# Patient Record
Sex: MALE | Race: BLACK OR AFRICAN AMERICAN | NOT HISPANIC OR LATINO
[De-identification: names, ages, dates, MRNs, and addresses within clinical notes are randomized per-mention and may not be internally consistent; named-entity substitution may affect disease eponyms.]

---

## 2017-10-18 ENCOUNTER — APPOINTMENT (OUTPATIENT)
Dept: OPHTHALMOLOGY | Facility: CLINIC | Age: 62
End: 2017-10-18
Payer: COMMERCIAL

## 2017-10-18 DIAGNOSIS — Z87.891 PERSONAL HISTORY OF NICOTINE DEPENDENCE: ICD-10-CM

## 2017-10-18 PROCEDURE — 92225: CPT | Mod: RT

## 2017-10-18 PROCEDURE — 92014 COMPRE OPH EXAM EST PT 1/>: CPT

## 2017-10-25 ENCOUNTER — APPOINTMENT (OUTPATIENT)
Dept: OPHTHALMOLOGY | Facility: CLINIC | Age: 62
End: 2017-10-25

## 2017-11-01 ENCOUNTER — APPOINTMENT (OUTPATIENT)
Dept: PULMONOLOGY | Facility: CLINIC | Age: 62
End: 2017-11-01

## 2017-11-09 ENCOUNTER — APPOINTMENT (OUTPATIENT)
Dept: PULMONOLOGY | Facility: CLINIC | Age: 62
End: 2017-11-09
Payer: COMMERCIAL

## 2017-11-09 VITALS
HEIGHT: 73 IN | SYSTOLIC BLOOD PRESSURE: 158 MMHG | OXYGEN SATURATION: 98 % | WEIGHT: 252 LBS | DIASTOLIC BLOOD PRESSURE: 100 MMHG | HEART RATE: 85 BPM | BODY MASS INDEX: 33.4 KG/M2

## 2017-11-09 DIAGNOSIS — Z86.69 PERSONAL HISTORY OF OTHER DISEASES OF THE NERVOUS SYSTEM AND SENSE ORGANS: ICD-10-CM

## 2017-11-09 DIAGNOSIS — I10 ESSENTIAL (PRIMARY) HYPERTENSION: ICD-10-CM

## 2017-11-09 DIAGNOSIS — Z82.49 FAMILY HISTORY OF ISCHEMIC HEART DISEASE AND OTHER DISEASES OF THE CIRCULATORY SYSTEM: ICD-10-CM

## 2017-11-09 DIAGNOSIS — Z86.39 PERSONAL HISTORY OF OTHER ENDOCRINE, NUTRITIONAL AND METABOLIC DISEASE: ICD-10-CM

## 2017-11-09 DIAGNOSIS — Z86.79 PERSONAL HISTORY OF OTHER DISEASES OF THE CIRCULATORY SYSTEM: ICD-10-CM

## 2017-11-09 PROCEDURE — 99214 OFFICE O/P EST MOD 30 MIN: CPT

## 2017-11-09 RX ORDER — PANTOPRAZOLE 40 MG/1
40 TABLET, DELAYED RELEASE ORAL
Qty: 90 | Refills: 0 | Status: DISCONTINUED | COMMUNITY
Start: 2016-10-16 | End: 2017-11-09

## 2017-11-09 RX ORDER — PEN NEEDLE, DIABETIC 29 G X1/2"
29G X 12.7MM NEEDLE, DISPOSABLE MISCELLANEOUS
Qty: 180 | Refills: 0 | Status: ACTIVE | COMMUNITY
Start: 2016-10-16

## 2017-11-09 RX ORDER — ASPIRIN ENTERIC COATED TABLETS 81 MG 81 MG/1
81 TABLET, DELAYED RELEASE ORAL
Qty: 90 | Refills: 0 | Status: ACTIVE | COMMUNITY
Start: 2017-09-26

## 2017-11-09 RX ORDER — INSULIN GLARGINE 300 U/ML
300 INJECTION, SOLUTION SUBCUTANEOUS
Qty: 18 | Refills: 0 | Status: DISCONTINUED | COMMUNITY
Start: 2017-04-13 | End: 2017-11-09

## 2017-11-09 RX ORDER — TIOTROPIUM BROMIDE 18 UG/1
18 CAPSULE ORAL; RESPIRATORY (INHALATION)
Qty: 90 | Refills: 0 | Status: ACTIVE | COMMUNITY
Start: 2017-09-26

## 2017-11-09 RX ORDER — INSULIN LISPRO 100 [IU]/ML
100 INJECTION, SOLUTION INTRAVENOUS; SUBCUTANEOUS
Qty: 15 | Refills: 0 | Status: ACTIVE | COMMUNITY
Start: 2017-10-05

## 2017-11-09 RX ORDER — METFORMIN HYDROCHLORIDE 1000 MG/1
1000 TABLET, COATED ORAL
Qty: 180 | Refills: 0 | Status: ACTIVE | COMMUNITY
Start: 2016-10-16

## 2017-11-09 RX ORDER — BUDESONIDE AND FORMOTEROL FUMARATE DIHYDRATE 160; 4.5 UG/1; UG/1
160-4.5 AEROSOL RESPIRATORY (INHALATION)
Qty: 30 | Refills: 0 | Status: ACTIVE | COMMUNITY
Start: 2017-09-26

## 2017-11-09 RX ORDER — ERGOCALCIFEROL 1.25 MG/1
1.25 MG CAPSULE, LIQUID FILLED ORAL
Qty: 13 | Refills: 0 | Status: ACTIVE | COMMUNITY
Start: 2016-10-16

## 2017-11-09 RX ORDER — FUROSEMIDE 20 MG/1
20 TABLET ORAL
Qty: 90 | Refills: 0 | Status: ACTIVE | COMMUNITY
Start: 2016-10-16

## 2017-11-09 RX ORDER — AMLODIPINE BESYLATE 10 MG/1
10 TABLET ORAL
Qty: 90 | Refills: 0 | Status: ACTIVE | COMMUNITY
Start: 2017-05-15

## 2017-11-09 RX ORDER — LABETALOL HYDROCHLORIDE 200 MG/1
200 TABLET, FILM COATED ORAL
Qty: 450 | Refills: 0 | Status: ACTIVE | COMMUNITY
Start: 2016-10-16

## 2017-11-09 RX ORDER — CLONIDINE HYDROCHLORIDE 0.2 MG/1
0.2 TABLET ORAL
Qty: 180 | Refills: 0 | Status: ACTIVE | COMMUNITY
Start: 2016-10-16

## 2017-11-09 RX ORDER — CHLORHEXIDINE GLUCONATE 4 %
1000 LIQUID (ML) TOPICAL
Qty: 90 | Refills: 0 | Status: ACTIVE | COMMUNITY
Start: 2016-10-16

## 2017-11-09 RX ORDER — ATORVASTATIN CALCIUM 20 MG/1
20 TABLET, FILM COATED ORAL
Qty: 90 | Refills: 0 | Status: ACTIVE | COMMUNITY
Start: 2017-05-15

## 2017-11-09 RX ORDER — ENALAPRIL MALEATE 20 MG/1
20 TABLET ORAL
Qty: 180 | Refills: 0 | Status: DISCONTINUED | COMMUNITY
Start: 2016-10-16 | End: 2017-11-09

## 2017-11-27 ENCOUNTER — APPOINTMENT (OUTPATIENT)
Dept: PULMONOLOGY | Facility: CLINIC | Age: 62
End: 2017-11-27
Payer: COMMERCIAL

## 2017-11-27 VITALS
DIASTOLIC BLOOD PRESSURE: 101 MMHG | SYSTOLIC BLOOD PRESSURE: 155 MMHG | HEART RATE: 82 BPM | OXYGEN SATURATION: 97 % | HEIGHT: 73 IN | WEIGHT: 250 LBS | BODY MASS INDEX: 33.13 KG/M2

## 2017-11-27 PROCEDURE — 99212 OFFICE O/P EST SF 10 MIN: CPT

## 2017-12-28 ENCOUNTER — APPOINTMENT (OUTPATIENT)
Dept: PULMONOLOGY | Facility: CLINIC | Age: 62
End: 2017-12-28
Payer: COMMERCIAL

## 2017-12-28 VITALS
SYSTOLIC BLOOD PRESSURE: 127 MMHG | HEIGHT: 73 IN | BODY MASS INDEX: 33.14 KG/M2 | DIASTOLIC BLOOD PRESSURE: 68 MMHG | HEART RATE: 86 BPM | WEIGHT: 250.06 LBS | OXYGEN SATURATION: 94 %

## 2017-12-28 DIAGNOSIS — R05 COUGH: ICD-10-CM

## 2017-12-28 DIAGNOSIS — R06.00 DYSPNEA, UNSPECIFIED: ICD-10-CM

## 2017-12-28 PROCEDURE — 99213 OFFICE O/P EST LOW 20 MIN: CPT | Mod: 25

## 2017-12-28 PROCEDURE — 94727 GAS DIL/WSHOT DETER LNG VOL: CPT

## 2017-12-28 PROCEDURE — 94010 BREATHING CAPACITY TEST: CPT

## 2017-12-28 PROCEDURE — 94729 DIFFUSING CAPACITY: CPT

## 2018-01-03 ENCOUNTER — APPOINTMENT (OUTPATIENT)
Dept: PULMONOLOGY | Facility: CLINIC | Age: 63
End: 2018-01-03

## 2018-01-09 ENCOUNTER — FORM ENCOUNTER (OUTPATIENT)
Age: 63
End: 2018-01-09

## 2018-01-10 ENCOUNTER — APPOINTMENT (OUTPATIENT)
Dept: CT IMAGING | Facility: IMAGING CENTER | Age: 63
End: 2018-01-10
Payer: COMMERCIAL

## 2018-01-10 ENCOUNTER — OUTPATIENT (OUTPATIENT)
Dept: OUTPATIENT SERVICES | Facility: HOSPITAL | Age: 63
LOS: 1 days | End: 2018-01-10
Payer: COMMERCIAL

## 2018-01-10 DIAGNOSIS — Z00.8 ENCOUNTER FOR OTHER GENERAL EXAMINATION: ICD-10-CM

## 2018-01-10 DIAGNOSIS — Z98.89 OTHER SPECIFIED POSTPROCEDURAL STATES: Chronic | ICD-10-CM

## 2018-01-10 PROCEDURE — 82565 ASSAY OF CREATININE: CPT

## 2018-01-10 PROCEDURE — 71260 CT THORAX DX C+: CPT

## 2018-01-10 PROCEDURE — 71260 CT THORAX DX C+: CPT | Mod: 26

## 2018-02-20 ENCOUNTER — APPOINTMENT (OUTPATIENT)
Dept: PULMONOLOGY | Facility: CLINIC | Age: 63
End: 2018-02-20
Payer: COMMERCIAL

## 2018-02-20 VITALS
DIASTOLIC BLOOD PRESSURE: 84 MMHG | BODY MASS INDEX: 33.53 KG/M2 | SYSTOLIC BLOOD PRESSURE: 152 MMHG | HEIGHT: 73 IN | OXYGEN SATURATION: 94 % | HEART RATE: 89 BPM | WEIGHT: 253 LBS

## 2018-02-20 PROCEDURE — 99214 OFFICE O/P EST MOD 30 MIN: CPT | Mod: 25

## 2018-02-20 PROCEDURE — 94727 GAS DIL/WSHOT DETER LNG VOL: CPT

## 2018-02-20 PROCEDURE — 94729 DIFFUSING CAPACITY: CPT

## 2018-02-20 PROCEDURE — 94010 BREATHING CAPACITY TEST: CPT

## 2018-02-20 RX ORDER — FEXOFENADINE HYDROCHLORIDE 180 MG/1
180 TABLET ORAL DAILY
Qty: 30 | Refills: 0 | Status: ACTIVE | COMMUNITY
Start: 2018-02-20 | End: 1900-01-01

## 2018-02-21 ENCOUNTER — MED ADMIN CHARGE (OUTPATIENT)
Age: 63
End: 2018-02-21

## 2018-04-10 ENCOUNTER — APPOINTMENT (OUTPATIENT)
Dept: OPHTHALMOLOGY | Facility: CLINIC | Age: 63
End: 2018-04-10
Payer: COMMERCIAL

## 2018-04-10 DIAGNOSIS — E11.3291 TYPE 2 DIABETES MELLITUS WITH MILD NONPROLIFERATIVE DIABETIC RETINOPATHY WITHOUT MACULAR EDEMA, RIGHT EYE: ICD-10-CM

## 2018-04-10 DIAGNOSIS — E11.3552 TYPE 2 DIABETES MELLITUS WITH STABLE PROLIFERATIVE DIABETIC RETINOPATHY, LEFT EYE: ICD-10-CM

## 2018-04-10 PROCEDURE — 92226: CPT | Mod: LT

## 2018-04-10 PROCEDURE — 92014 COMPRE OPH EXAM EST PT 1/>: CPT

## 2018-05-31 ENCOUNTER — APPOINTMENT (OUTPATIENT)
Dept: PULMONOLOGY | Facility: CLINIC | Age: 63
End: 2018-05-31
Payer: COMMERCIAL

## 2018-05-31 VITALS
WEIGHT: 254 LBS | HEART RATE: 79 BPM | HEIGHT: 73 IN | DIASTOLIC BLOOD PRESSURE: 85 MMHG | SYSTOLIC BLOOD PRESSURE: 160 MMHG | BODY MASS INDEX: 33.66 KG/M2 | OXYGEN SATURATION: 96 %

## 2018-05-31 DIAGNOSIS — G47.30 SLEEP APNEA, UNSPECIFIED: ICD-10-CM

## 2018-05-31 DIAGNOSIS — R05 COUGH: ICD-10-CM

## 2018-05-31 DIAGNOSIS — J44.9 CHRONIC OBSTRUCTIVE PULMONARY DISEASE, UNSPECIFIED: ICD-10-CM

## 2018-05-31 PROCEDURE — 99213 OFFICE O/P EST LOW 20 MIN: CPT | Mod: 25

## 2018-05-31 PROCEDURE — 94729 DIFFUSING CAPACITY: CPT

## 2018-05-31 PROCEDURE — 94010 BREATHING CAPACITY TEST: CPT

## 2018-05-31 RX ORDER — EFINACONAZOLE 100 MG/ML
10 SOLUTION TOPICAL
Qty: 8 | Refills: 0 | Status: ACTIVE | COMMUNITY
Start: 2018-04-19

## 2018-05-31 RX ORDER — LORATADINE 10 MG/1
10 TABLET ORAL
Qty: 90 | Refills: 0 | Status: ACTIVE | COMMUNITY
Start: 2018-03-29

## 2018-05-31 RX ORDER — AMOXICILLIN AND CLAVULANATE POTASSIUM 875; 125 MG/1; MG/1
875-125 TABLET, COATED ORAL
Qty: 20 | Refills: 0 | Status: ACTIVE | COMMUNITY
Start: 2018-01-07

## 2018-05-31 RX ORDER — CLONIDINE HYDROCHLORIDE 0.1 MG/1
0.1 TABLET ORAL
Qty: 180 | Refills: 0 | Status: ACTIVE | COMMUNITY
Start: 2017-01-17

## 2018-05-31 RX ORDER — AZITHROMYCIN 250 MG/1
250 TABLET, FILM COATED ORAL
Qty: 6 | Refills: 0 | Status: ACTIVE | COMMUNITY
Start: 2017-12-19

## 2018-06-05 ENCOUNTER — OTHER (OUTPATIENT)
Age: 63
End: 2018-06-05

## 2018-06-25 ENCOUNTER — INPATIENT (INPATIENT)
Facility: HOSPITAL | Age: 63
LOS: 2 days | Discharge: HOME | End: 2018-06-28
Attending: INTERNAL MEDICINE | Admitting: INTERNAL MEDICINE
Payer: COMMERCIAL

## 2018-06-25 VITALS
SYSTOLIC BLOOD PRESSURE: 190 MMHG | OXYGEN SATURATION: 95 % | HEART RATE: 88 BPM | DIASTOLIC BLOOD PRESSURE: 110 MMHG | RESPIRATION RATE: 16 BRPM | TEMPERATURE: 98 F

## 2018-06-25 DIAGNOSIS — R06.02 SHORTNESS OF BREATH: ICD-10-CM

## 2018-06-25 DIAGNOSIS — Z29.9 ENCOUNTER FOR PROPHYLACTIC MEASURES, UNSPECIFIED: ICD-10-CM

## 2018-06-25 DIAGNOSIS — J44.9 CHRONIC OBSTRUCTIVE PULMONARY DISEASE, UNSPECIFIED: ICD-10-CM

## 2018-06-25 DIAGNOSIS — I16.0 HYPERTENSIVE URGENCY: ICD-10-CM

## 2018-06-25 DIAGNOSIS — E11.9 TYPE 2 DIABETES MELLITUS WITHOUT COMPLICATIONS: ICD-10-CM

## 2018-06-25 DIAGNOSIS — Z98.89 OTHER SPECIFIED POSTPROCEDURAL STATES: Chronic | ICD-10-CM

## 2018-06-25 LAB
ALBUMIN SERPL ELPH-MCNC: 4 G/DL — SIGNIFICANT CHANGE UP (ref 3.3–5)
ALP SERPL-CCNC: 73 U/L — SIGNIFICANT CHANGE UP (ref 40–120)
ALT FLD-CCNC: 14 U/L — SIGNIFICANT CHANGE UP (ref 4–41)
APTT BLD: 31.5 SEC — SIGNIFICANT CHANGE UP (ref 27.5–37.4)
AST SERPL-CCNC: 18 U/L — SIGNIFICANT CHANGE UP (ref 4–40)
B PERT DNA SPEC QL NAA+PROBE: SIGNIFICANT CHANGE UP
BASOPHILS # BLD AUTO: 0.03 K/UL — SIGNIFICANT CHANGE UP (ref 0–0.2)
BASOPHILS NFR BLD AUTO: 0.3 % — SIGNIFICANT CHANGE UP (ref 0–2)
BILIRUB SERPL-MCNC: 0.3 MG/DL — SIGNIFICANT CHANGE UP (ref 0.2–1.2)
BUN SERPL-MCNC: 15 MG/DL — SIGNIFICANT CHANGE UP (ref 7–23)
C PNEUM DNA SPEC QL NAA+PROBE: NOT DETECTED — SIGNIFICANT CHANGE UP
CALCIUM SERPL-MCNC: 9.2 MG/DL — SIGNIFICANT CHANGE UP (ref 8.4–10.5)
CHLORIDE SERPL-SCNC: 106 MMOL/L — SIGNIFICANT CHANGE UP (ref 98–107)
CO2 SERPL-SCNC: 25 MMOL/L — SIGNIFICANT CHANGE UP (ref 22–31)
CREAT SERPL-MCNC: 1.25 MG/DL — SIGNIFICANT CHANGE UP (ref 0.5–1.3)
EOSINOPHIL # BLD AUTO: 0.24 K/UL — SIGNIFICANT CHANGE UP (ref 0–0.5)
EOSINOPHIL NFR BLD AUTO: 2.6 % — SIGNIFICANT CHANGE UP (ref 0–6)
FLUAV H1 2009 PAND RNA SPEC QL NAA+PROBE: NOT DETECTED — SIGNIFICANT CHANGE UP
FLUAV H1 RNA SPEC QL NAA+PROBE: NOT DETECTED — SIGNIFICANT CHANGE UP
FLUAV H3 RNA SPEC QL NAA+PROBE: NOT DETECTED — SIGNIFICANT CHANGE UP
FLUAV SUBTYP SPEC NAA+PROBE: SIGNIFICANT CHANGE UP
FLUBV RNA SPEC QL NAA+PROBE: NOT DETECTED — SIGNIFICANT CHANGE UP
GLUCOSE SERPL-MCNC: 117 MG/DL — HIGH (ref 70–99)
HADV DNA SPEC QL NAA+PROBE: NOT DETECTED — SIGNIFICANT CHANGE UP
HBA1C BLD-MCNC: 7.1 % — HIGH (ref 4–5.6)
HCOV 229E RNA SPEC QL NAA+PROBE: NOT DETECTED — SIGNIFICANT CHANGE UP
HCOV HKU1 RNA SPEC QL NAA+PROBE: NOT DETECTED — SIGNIFICANT CHANGE UP
HCOV NL63 RNA SPEC QL NAA+PROBE: NOT DETECTED — SIGNIFICANT CHANGE UP
HCOV OC43 RNA SPEC QL NAA+PROBE: NOT DETECTED — SIGNIFICANT CHANGE UP
HCT VFR BLD CALC: 42.3 % — SIGNIFICANT CHANGE UP (ref 39–50)
HGB BLD-MCNC: 13.6 G/DL — SIGNIFICANT CHANGE UP (ref 13–17)
HMPV RNA SPEC QL NAA+PROBE: NOT DETECTED — SIGNIFICANT CHANGE UP
HPIV1 RNA SPEC QL NAA+PROBE: NOT DETECTED — SIGNIFICANT CHANGE UP
HPIV2 RNA SPEC QL NAA+PROBE: NOT DETECTED — SIGNIFICANT CHANGE UP
HPIV3 RNA SPEC QL NAA+PROBE: NOT DETECTED — SIGNIFICANT CHANGE UP
HPIV4 RNA SPEC QL NAA+PROBE: NOT DETECTED — SIGNIFICANT CHANGE UP
IMM GRANULOCYTES # BLD AUTO: 0.04 # — SIGNIFICANT CHANGE UP
IMM GRANULOCYTES NFR BLD AUTO: 0.4 % — SIGNIFICANT CHANGE UP (ref 0–1.5)
INR BLD: 1.06 — SIGNIFICANT CHANGE UP (ref 0.88–1.17)
LYMPHOCYTES # BLD AUTO: 1.57 K/UL — SIGNIFICANT CHANGE UP (ref 1–3.3)
LYMPHOCYTES # BLD AUTO: 17 % — SIGNIFICANT CHANGE UP (ref 13–44)
M PNEUMO DNA SPEC QL NAA+PROBE: NOT DETECTED — SIGNIFICANT CHANGE UP
MCHC RBC-ENTMCNC: 28.3 PG — SIGNIFICANT CHANGE UP (ref 27–34)
MCHC RBC-ENTMCNC: 32.2 % — SIGNIFICANT CHANGE UP (ref 32–36)
MCV RBC AUTO: 88.1 FL — SIGNIFICANT CHANGE UP (ref 80–100)
MONOCYTES # BLD AUTO: 0.64 K/UL — SIGNIFICANT CHANGE UP (ref 0–0.9)
MONOCYTES NFR BLD AUTO: 6.9 % — SIGNIFICANT CHANGE UP (ref 2–14)
NEUTROPHILS # BLD AUTO: 6.72 K/UL — SIGNIFICANT CHANGE UP (ref 1.8–7.4)
NEUTROPHILS NFR BLD AUTO: 72.8 % — SIGNIFICANT CHANGE UP (ref 43–77)
NRBC # FLD: 0 — SIGNIFICANT CHANGE UP
PLATELET # BLD AUTO: 193 K/UL — SIGNIFICANT CHANGE UP (ref 150–400)
PMV BLD: 10.5 FL — SIGNIFICANT CHANGE UP (ref 7–13)
POTASSIUM SERPL-MCNC: 4.5 MMOL/L — SIGNIFICANT CHANGE UP (ref 3.5–5.3)
POTASSIUM SERPL-SCNC: 4.5 MMOL/L — SIGNIFICANT CHANGE UP (ref 3.5–5.3)
PROT SERPL-MCNC: 7.1 G/DL — SIGNIFICANT CHANGE UP (ref 6–8.3)
PROTHROM AB SERPL-ACNC: 11.8 SEC — SIGNIFICANT CHANGE UP (ref 9.8–13.1)
RBC # BLD: 4.8 M/UL — SIGNIFICANT CHANGE UP (ref 4.2–5.8)
RBC # FLD: 13.5 % — SIGNIFICANT CHANGE UP (ref 10.3–14.5)
RSV RNA SPEC QL NAA+PROBE: NOT DETECTED — SIGNIFICANT CHANGE UP
RV+EV RNA SPEC QL NAA+PROBE: NOT DETECTED — SIGNIFICANT CHANGE UP
SODIUM SERPL-SCNC: 144 MMOL/L — SIGNIFICANT CHANGE UP (ref 135–145)
TROPONIN T, HIGH SENSITIVITY: 19 NG/L — SIGNIFICANT CHANGE UP (ref ?–14)
TROPONIN T, HIGH SENSITIVITY: 20 NG/L — SIGNIFICANT CHANGE UP (ref ?–14)
TSH SERPL-MCNC: 1.34 UIU/ML — SIGNIFICANT CHANGE UP (ref 0.27–4.2)
WBC # BLD: 9.24 K/UL — SIGNIFICANT CHANGE UP (ref 3.8–10.5)
WBC # FLD AUTO: 9.24 K/UL — SIGNIFICANT CHANGE UP (ref 3.8–10.5)

## 2018-06-25 PROCEDURE — 71250 CT THORAX DX C-: CPT | Mod: 26

## 2018-06-25 PROCEDURE — 71046 X-RAY EXAM CHEST 2 VIEWS: CPT | Mod: 26

## 2018-06-25 RX ORDER — BUDESONIDE AND FORMOTEROL FUMARATE DIHYDRATE 160; 4.5 UG/1; UG/1
2 AEROSOL RESPIRATORY (INHALATION)
Qty: 0 | Refills: 0 | Status: DISCONTINUED | OUTPATIENT
Start: 2018-06-25 | End: 2018-06-28

## 2018-06-25 RX ORDER — SODIUM CHLORIDE 9 MG/ML
3 INJECTION INTRAMUSCULAR; INTRAVENOUS; SUBCUTANEOUS EVERY 8 HOURS
Qty: 0 | Refills: 0 | Status: DISCONTINUED | OUTPATIENT
Start: 2018-06-25 | End: 2018-06-28

## 2018-06-25 RX ORDER — DEXTROSE 50 % IN WATER 50 %
12.5 SYRINGE (ML) INTRAVENOUS ONCE
Qty: 0 | Refills: 0 | Status: DISCONTINUED | OUTPATIENT
Start: 2018-06-25 | End: 2018-06-28

## 2018-06-25 RX ORDER — DEXTROSE 50 % IN WATER 50 %
15 SYRINGE (ML) INTRAVENOUS ONCE
Qty: 0 | Refills: 0 | Status: DISCONTINUED | OUTPATIENT
Start: 2018-06-25 | End: 2018-06-28

## 2018-06-25 RX ORDER — INSULIN GLARGINE 100 [IU]/ML
45 INJECTION, SOLUTION SUBCUTANEOUS AT BEDTIME
Qty: 0 | Refills: 0 | Status: DISCONTINUED | OUTPATIENT
Start: 2018-06-25 | End: 2018-06-25

## 2018-06-25 RX ORDER — LABETALOL HCL 100 MG
450 TABLET ORAL
Qty: 0 | Refills: 0 | Status: DISCONTINUED | OUTPATIENT
Start: 2018-06-25 | End: 2018-06-25

## 2018-06-25 RX ORDER — HYDRALAZINE HCL 50 MG
20 TABLET ORAL ONCE
Qty: 0 | Refills: 0 | Status: COMPLETED | OUTPATIENT
Start: 2018-06-25 | End: 2018-06-25

## 2018-06-25 RX ORDER — CLOPIDOGREL BISULFATE 75 MG/1
600 TABLET, FILM COATED ORAL ONCE
Qty: 0 | Refills: 0 | Status: COMPLETED | OUTPATIENT
Start: 2018-06-25 | End: 2018-06-25

## 2018-06-25 RX ORDER — INSULIN GLARGINE 100 [IU]/ML
60 INJECTION, SOLUTION SUBCUTANEOUS EVERY MORNING
Qty: 0 | Refills: 0 | Status: DISCONTINUED | OUTPATIENT
Start: 2018-06-25 | End: 2018-06-28

## 2018-06-25 RX ORDER — GLUCAGON INJECTION, SOLUTION 0.5 MG/.1ML
1 INJECTION, SOLUTION SUBCUTANEOUS ONCE
Qty: 0 | Refills: 0 | Status: DISCONTINUED | OUTPATIENT
Start: 2018-06-25 | End: 2018-06-28

## 2018-06-25 RX ORDER — DEXTROSE 50 % IN WATER 50 %
25 SYRINGE (ML) INTRAVENOUS ONCE
Qty: 0 | Refills: 0 | Status: DISCONTINUED | OUTPATIENT
Start: 2018-06-25 | End: 2018-06-28

## 2018-06-25 RX ORDER — PREGABALIN 225 MG/1
1000 CAPSULE ORAL DAILY
Qty: 0 | Refills: 0 | Status: DISCONTINUED | OUTPATIENT
Start: 2018-06-25 | End: 2018-06-28

## 2018-06-25 RX ORDER — HEPARIN SODIUM 5000 [USP'U]/ML
7500 INJECTION INTRAVENOUS; SUBCUTANEOUS EVERY 8 HOURS
Qty: 0 | Refills: 0 | Status: DISCONTINUED | OUTPATIENT
Start: 2018-06-25 | End: 2018-06-25

## 2018-06-25 RX ORDER — SODIUM CHLORIDE 9 MG/ML
1000 INJECTION, SOLUTION INTRAVENOUS
Qty: 0 | Refills: 0 | Status: DISCONTINUED | OUTPATIENT
Start: 2018-06-25 | End: 2018-06-28

## 2018-06-25 RX ORDER — ASPIRIN/CALCIUM CARB/MAGNESIUM 324 MG
325 TABLET ORAL ONCE
Qty: 0 | Refills: 0 | Status: COMPLETED | OUTPATIENT
Start: 2018-06-25 | End: 2018-06-25

## 2018-06-25 RX ORDER — HEPARIN SODIUM 5000 [USP'U]/ML
5000 INJECTION INTRAVENOUS; SUBCUTANEOUS EVERY 8 HOURS
Qty: 0 | Refills: 0 | Status: DISCONTINUED | OUTPATIENT
Start: 2018-06-25 | End: 2018-06-28

## 2018-06-25 RX ORDER — INSULIN LISPRO 100/ML
VIAL (ML) SUBCUTANEOUS
Qty: 0 | Refills: 0 | Status: DISCONTINUED | OUTPATIENT
Start: 2018-06-25 | End: 2018-06-28

## 2018-06-25 RX ORDER — TIOTROPIUM BROMIDE 18 UG/1
1 CAPSULE ORAL; RESPIRATORY (INHALATION) DAILY
Qty: 0 | Refills: 0 | Status: DISCONTINUED | OUTPATIENT
Start: 2018-06-25 | End: 2018-06-28

## 2018-06-25 RX ORDER — LABETALOL HCL 100 MG
500 TABLET ORAL
Qty: 0 | Refills: 0 | Status: DISCONTINUED | OUTPATIENT
Start: 2018-06-25 | End: 2018-06-28

## 2018-06-25 RX ORDER — FUROSEMIDE 40 MG
40 TABLET ORAL DAILY
Qty: 0 | Refills: 0 | Status: DISCONTINUED | OUTPATIENT
Start: 2018-06-25 | End: 2018-06-28

## 2018-06-25 RX ADMIN — CLOPIDOGREL BISULFATE 600 MILLIGRAM(S): 75 TABLET, FILM COATED ORAL at 12:42

## 2018-06-25 RX ADMIN — HEPARIN SODIUM 5000 UNIT(S): 5000 INJECTION INTRAVENOUS; SUBCUTANEOUS at 23:27

## 2018-06-25 RX ADMIN — SODIUM CHLORIDE 3 MILLILITER(S): 9 INJECTION INTRAMUSCULAR; INTRAVENOUS; SUBCUTANEOUS at 23:23

## 2018-06-25 RX ADMIN — Medication 20 MILLIGRAM(S): at 18:22

## 2018-06-25 RX ADMIN — Medication 0.2 MILLIGRAM(S): at 23:57

## 2018-06-25 RX ADMIN — BUDESONIDE AND FORMOTEROL FUMARATE DIHYDRATE 2 PUFF(S): 160; 4.5 AEROSOL RESPIRATORY (INHALATION) at 23:14

## 2018-06-25 RX ADMIN — Medication 500 MILLIGRAM(S): at 23:58

## 2018-06-25 RX ADMIN — Medication 325 MILLIGRAM(S): at 12:42

## 2018-06-25 RX ADMIN — Medication 40 MILLIGRAM(S): at 15:44

## 2018-06-25 NOTE — H&P ADULT - NSHPLABSRESULTS_GEN_ALL_CORE
EK.6   .  )-----------( 193      ( 2018 11:57 )             42.3         144  |  106  |  15  ----------------------------<  117<H>  4.5   |  25  |  1.25    Ca    9.2      2018 11:57    TPro  7.1  /  Alb  4.0  /  TBili  0.3  /  DBili  x   /  AST  18  /  ALT  14  /  AlkPhos  73      Troponin T, High Sensitivity: 20: _______________________________________________________  This result was obtained using the new  5th generation high  sensitivity troponin assay.  The units and reference limits  are different from the previous 4th generation.  For  interpretive guidance, please contact your clinical  leadership. For technical questions, contact the laboratory.  ________________________________________________________  ---------------------***PLEASE NOTE***----------------------  Rapid changes upward or downward in high-sensitivity  troponin levels strongly suggest acute myocardial injury.  Hemolysis may falsely lower results. Renal impairment may  increase results.    Normal: <6 - 14 ng/L  Indeterminate: 15 - 51 ng/L  Elevated: >51 ng/L    Please see "[email or web address]" on the [employer] for more information. ng/L (18 @ 13:05)    Troponin T, High Sensitivity: 19: _______________________________________________________  This result was obtained using the new  5th generation high  sensitivity troponin assay.  The units and reference limits  are different from the previous 4th generation.  For  interpretive guidance, please contact your clinical  leadership. For technical questions, contact the laboratory.  ________________________________________________________  ---------------------***PLEASE NOTE***----------------------  Rapid changes upward or downward in high-sensitivity  troponin levels strongly suggest acute myocardial injury.  Hemolysis may falsely lower results. Renal impairment may  increase results.    Normal: <6 - 14 ng/L  Indeterminate: 15 - 51 ng/L  Elevated: >51 ng/L    Please see "[email or web address]" on the [employer]  intranet for more information. ng/L (18 @ 11:57)  < from: CT Chest No Cont (18 @ 16:57) >    PROCEDURE DATE:  2018     ******PRELIMINARY REPORT******    ******PRELIMINARY REPORT******            INTERPRETATION:  right pleural thickening. trace left pleural effusion.            ******PRELIMINARY REPORT******    ******PRELIMINARY REPORT******          KEYSHAWN PAIZ M.D., RADIOLOGY RESIDENT    < end of copied text > EKG: NSR 90 BPM, QTC: 477. 1mm DARREN in V2                        13.6   9.24  )-----------( 193      ( 25 Jun 2018 11:57 )             42.3     06-25    144  |  106  |  15  ----------------------------<  117<H>  4.5   |  25  |  1.25    Ca    9.2      25 Jun 2018 11:57    TPro  7.1  /  Alb  4.0  /  TBili  0.3  /  DBili  x   /  AST  18  /  ALT  14  /  AlkPhos  73  06-25    Troponin T, High Sensitivity: 20: _______________________________________________________  This result was obtained using the new  5th generation high  sensitivity troponin assay.  The units and reference limits  are different from the previous 4th generation.  For  interpretive guidance, please contact your clinical  leadership. For technical questions, contact the laboratory.  ________________________________________________________  ---------------------***PLEASE NOTE***----------------------  Rapid changes upward or downward in high-sensitivity  troponin levels strongly suggest acute myocardial injury.  Hemolysis may falsely lower results. Renal impairment may  increase results.    Normal: <6 - 14 ng/L  Indeterminate: 15 - 51 ng/L  Elevated: >51 ng/L    Please see "[email or web address]" on the [employer] for more information. ng/L (06.25.18 @ 13:05)    Troponin T, High Sensitivity: 19: _______________________________________________________  This result was obtained using the new  5th generation high  sensitivity troponin assay.  The units and reference limits  are different from the previous 4th generation.  For  interpretive guidance, please contact your clinical  leadership. For technical questions, contact the laboratory.  ________________________________________________________  ---------------------***PLEASE NOTE***----------------------  Rapid changes upward or downward in high-sensitivity  troponin levels strongly suggest acute myocardial injury.  Hemolysis may falsely lower results. Renal impairment may  increase results.    Normal: <6 - 14 ng/L  Indeterminate: 15 - 51 ng/L  Elevated: >51 ng/L    Please see "[email or web address]" on the [employer]  intranet for more information. ng/L (06.25.18 @ 11:57)  < from: CT Chest No Cont (06.25.18 @ 16:57) >    PROCEDURE DATE:  Jun 25 2018     ******PRELIMINARY REPORT******    ******PRELIMINARY REPORT******            INTERPRETATION:  right pleural thickening. trace left pleural effusion.            ******PRELIMINARY REPORT******    ******PRELIMINARY REPORT******          KEYSHAWN PAIZ M.D., RADIOLOGY RESIDENT    < end of copied text >

## 2018-06-25 NOTE — CONSULT NOTE ADULT - ASSESSMENT
63 year old man with history of Hypertension, DM, COPD, Aotic stenosis  admitted with increased dyspnea and cough for the past 2 weeks.      1. Dyspnea, cough  likely related and secondary to volume overload secondary to acute on chronic diastolic HF in setting of uncontrolled HTN  ? viral process   ct chest, hx of long time tobacco use  add nebs  pulm eval inpt vs outp  cont diuresis as per cards  check echo       2. hypertension  bp uncontrolled  cont home meds  adjust meds    3. Abnl EKG  likely sec to lvh  recent stress nl  trend ce's    4. Aortic insuff  mod and stable on recent echo  echo  dvt ppx 63 year old man with history of Hypertension, DM, COPD, Aotic stenosis  admitted with increased dyspnea and cough for the past 2 weeks.      1. Dyspnea, cough  likely related and secondary to volume overload secondary to acute on chronic diastolic HF in setting of uncontrolled HTN  ? viral process   ct chest, hx of long time tobacco use  add nebs  pulm eval inpt vs outp  cont diuresis as per cards  check echo       2. hypertension  bp uncontrolled  cont home meds  adjust meds    3. Abnl EKG  likely sec to lvh  recent stress nl  trend ce's    4. Aortic insuff  mod and stable on recent echo  echo  dvt ppx      dm  fsg riss  hold oral meds  diabetic diet  check hga1c

## 2018-06-25 NOTE — ED PROVIDER NOTE - OBJECTIVE STATEMENT
63M with hx of HTN, DM p/w sob and cough x2 weeks. Patient states cough is worsening and came to ED for eval. only mild sob at rest, worse with exertion and coughing. no recent travel, sick contacts, palpitations, cp, fever, chills.

## 2018-06-25 NOTE — ED PROVIDER NOTE - ATTENDING CONTRIBUTION TO CARE
63M h/o HTN, DM presents with dyspnea and cough. States ongoing SOB for 1-2 months, somewhat worse w exertion. Progressively worsening cough. No CP. No fever. Stable peripheral edema. No prior cardiac hx, last stress 2-3 weeks ago normal by pt report.  On exam well appearing, nad, lungs clear, rrr, abd soft, 2+ pulses, 1+ b/l edema, no rash, speech clear, no focal neuro deficits.  EKG with DARREN V1-3 worse than prior, LVH similar to prior.  Trop boarderline.  STEMI alert from triage, seen by cards, hx not c/s with STEMI, no emergent cath. CXR with mild pulm edema. Plan for admission to [facility].

## 2018-06-25 NOTE — ED ADULT TRIAGE NOTE - CHIEF COMPLAINT QUOTE
pt states that he has been having a cough with clear/green phlegm x 1 month.  States that he was seen by hs PMD and given abx but not feeling better.  PMH HTN

## 2018-06-25 NOTE — CONSULT NOTE ADULT - SUBJECTIVE AND OBJECTIVE BOX
Patient is a 63 year old man with history of Hypertension, DM, COPD, aortic insuff admitted with increased dyspnea and cough for the past 2 weeks.  In Er ecg with some changes  no chest pain  denies any chest pain, palpitations, syncope, nausea, abdominal pain, fever, chills,     Denies any history of CAD, MI, cardiomyopathy, or congenital heart disease.      INTERVAL HPI/OVERNIGHT EVENTS:    Medications:MEDICATIONS  (STANDING):  furosemide   Injectable 40 milliGRAM(s) IV Push daily  hydrALAZINE Injectable 20 milliGRAM(s) IV Push once    MEDICATIONS  (PRN):      Allergies: Allergies    No Known Allergies    Intolerances          FAMILY HISTORY:  No pertinent family history in first degree relatives        PAST MEDICAL & SURGICAL HISTORY:  Heart murmur: recent dx  Cholelithiasis  COPD (chronic obstructive pulmonary disease)  Diabetes:   Hypertension  S/P exploratory laparotomy: 23 years ago      REVIEW OF SYSTEMS:  CONSTITUTIONAL: weak  EYES: No eye pain, visual disturbances, or discharge  ENMT:  No difficulty hearing, tinnitus, vertigo; No sinus or throat pain  NECK: No pain or stiffness    RESPIRATORY: as above   CARDIOVASCULAR: No chest pain, palpitations, dizziness, or leg swelling  GASTROINTESTINAL: No abdominal or epigastric pain. No nausea, vomiting, or hematemesis; No diarrhea or constipation. No melena or hematochezia.  GENITOURINARY: No dysuria, frequency, hematuria, or incontinence  NEUROLOGICAL: No headaches, memory loss, loss of strength, numbness, or tremors  SKIN: No itching, burning, rashes, or lesions   MUSCULOSKELETAL: No joint pain or swelling; No muscle, back, or extremity pain      T(C): 36.9 (06-25-18 @ 17:06), Max: 36.9 (06-25-18 @ 17:06)  HR: 71 (06-25-18 @ 17:06) (71 - 95)  BP: 226/123 (06-25-18 @ 17:06) (190/110 - 226/123)  RR: 18 (06-25-18 @ 17:06) (14 - 18)  SpO2: 96% (06-25-18 @ 17:06) (95% - 98%)  Wt(kg): --Vital Signs Last 24 Hrs  T(C): 36.9 (25 Jun 2018 17:06), Max: 36.9 (25 Jun 2018 17:06)  T(F): 98.5 (25 Jun 2018 17:06), Max: 98.5 (25 Jun 2018 17:06)  HR: 71 (25 Jun 2018 17:06) (71 - 95)  BP: 226/123 (25 Jun 2018 17:06) (190/110 - 226/123)  BP(mean): --  RR: 18 (25 Jun 2018 17:06) (14 - 18)  SpO2: 96% (25 Jun 2018 17:06) (95% - 98%)  I&O's Summary      PHYSICAL EXAM:  GENERAL: NAD, well-groomed, well-developed  HEAD:  Atraumatic, Normocephalic  EYES: EOMI, PERRLA, conjunctiva and sclera clear  ENMT: No tonsillar erythema, exudates, or enlargement; Moist mucous membranes,No lesions  NECK: Supple, No JVD, Normal thyroid  NERVOUS SYSTEM:  Alert & Oriented X3, ; Motor Strength 5/5 B/L upper and lower extremities; DTRs 2+ intact and symmetric  CHEST/LUNG: Clear to percussion bilaterally; No rales, rhonchi, wheezing, or rubs  HEART: Regular rate and rhythm; + m no rub  ABDOMEN: Soft, Nontender, Nondistended; Bowel sounds present  EXTREMITIES:  2+ Peripheral Pulses, No clubbing, cyanosis, or edema  LYMPH: No lymphadenopathy noted  SKIN: No rashes or lesions    Consultant(s) Notes Reviewed:  [x ] YES  [ ] NO  Care Discussed with Consultants/Other Providerscpk [ x] YES  [ ] NO    LABS:                    CBC Full  -  ( 25 Jun 2018 11:57 )  WBC Count : 9.24 K/uL  Hemoglobin : 13.6 g/dL  Hematocrit : 42.3 %  Platelet Count - Automated : 193 K/uL  Mean Cell Volume : 88.1 fL  Mean Cell Hemoglobin : 28.3 pg  Mean Cell Hemoglobin Concentration : 32.2 %  Auto Neutrophil # : 6.72 K/uL  Auto Lymphocyte # : 1.57 K/uL  Auto Monocyte # : 0.64 K/uL  Auto Eosinophil # : 0.24 K/uL  Auto Basophil # : 0.03 K/uL  Auto Neutrophil % : 72.8 %  Auto Lymphocyte % : 17.0 %  Auto Monocyte % : 6.9 %  Auto Eosinophil % : 2.6 %  Auto Basophil % : 0.3 %      06-25    144  |  106  |  15  ----------------------------<  117<H>  4.5   |  25  |  1.25    Ca    9.2      25 Jun 2018 11:57    TPro  7.1  /  Alb  4.0  /  TBili  0.3  /  DBili  x   /  AST  18  /  ALT  14  /  AlkPhos  73  06-25          PT/INR - ( 25 Jun 2018 11:57 )   PT: 11.8 SEC;   INR: 1.06          PTT - ( 25 Jun 2018 11:57 )  PTT:31.5 SEC  RADIOLOGY & ADDITIONAL TESTS:    Imaging Personally Reviewed:  [ ] YES  [ ] NO

## 2018-06-25 NOTE — H&P ADULT - HISTORY OF PRESENT ILLNESS
62 y/o M with hx of HTN, DM, ?COPD [per chart review, patient unsure], Aortic insufficiency presents with SOB and cough x 1-2 weeks. SOB is intermittent and usually worse when he tries to lift and tries to do certain activities. He does not recall any SOB with exertion or when he lies on bed. He also complains of clear-green sputum cough x 2 weeks. denies fever, chills, falls, LOC, chest pain, abdominal pain, nausea, vomiting, melena, hematochezia, LE edema, dysuria, diarrhea, constipation or calf tenderness.     Patient had recent stress and echo at Dr. South's office

## 2018-06-25 NOTE — ED PROVIDER NOTE - PROGRESS NOTE DETAILS
Kory: EKG signed by me, I assessed patient quickly in waiting room, h/o DM and HTN with SOB and cough. ST segment elevations in V1- V3, cardiology contacted by me will come to the bedside. ekg emailed to [email or web address]. signed out to DR monaco Maisha: spoke with Cardiology, no cath lab activation. recommending ASA, Plavix load and tele admission.

## 2018-06-25 NOTE — H&P ADULT - NEGATIVE CARDIOVASCULAR SYMPTOMS
no orthopnea/no peripheral edema/no paroxysmal nocturnal dyspnea/no palpitations/no dyspnea on exertion/no chest pain

## 2018-06-25 NOTE — ED ADULT NURSE REASSESSMENT NOTE - NS ED NURSE REASSESS COMMENT FT1
spoke with MD South regarding PTs BP. report given to CANDI Reed. [place] [facility] [address]

## 2018-06-25 NOTE — H&P ADULT - ATTENDING COMMENTS
pt seen and examined  Patient is a 63 year old man with history of Hypertension, DM, COPD, AI admitted with increased dyspnea and cough for the past 2 weeks.  In Er ecg with some dynamic st abnl right precordial leads  no chest pain  denies any chest pain, palpitations, syncope, nausea, abdominal pain, fever, chills,  or rash.  Denies any history of CAD, MI, cardiomyopathy, or congenital heart disease.    recent stress echo normal   recent echo 4/2018 EF 65%, mod AI, mod lvh, costello dysfxn    александр negative  vitals  bp 190/90  nad aao x3 nc/at neck supple no jvd  cv s1s2 rrr sm  lungs clear b/l  abd soft, nt  ext b/l edema minimal     A/P  63 year old man with history of Hypertension, DM, COPD, AI admitted with increased dyspnea and cough for the past 2 weeks.      1. Dyspnea, cough  likely related and secondary to volume overload secondary to acute on chronic diastolic HF in setting of uncontrolled HTN  r/o viral illness  check ct chest, hx of long time tobacco use  cont diuresis with iv lasix daily   check echo   bp control    2. hypertension  bp uncontrolled  cont home meds  inc labetalol as needed    3. Abnl EKG  likely sec to lvh  recent stress nl  trend ce's    4. AI  mod and stable on recent echo  recheck echo   dvt ppx       p/w sob and cough x2 weeks. Patient states cough is worsening and came to ED for eval. only mild sob at rest, worse with exertion and coughing. no recent travel, sick contacts, palpitations, cp, fever, chills.

## 2018-06-25 NOTE — ED PROVIDER NOTE - PMH
Cholelithiasis    COPD (chronic obstructive pulmonary disease)    Diabetes  fs this am 120  Heart murmur  recent dx  Hypertension

## 2018-06-25 NOTE — H&P ADULT - PROBLEM SELECTOR PLAN 1
Admit to tele  check cbc, bmp, a1c, flp, tsh, trend CE  echo ordered   Strict I&Os  Fluid restriction  Daily weight  Lasix 20 IV daily  Follow official CT chest results

## 2018-06-25 NOTE — ED ADULT NURSE NOTE - OBJECTIVE STATEMENT
pt received to room #5 with c/o peristant cough and SOB x1 month. denies CP. pt aox4, ambulatory, well appearing. as per MD Ekg was concerning for STEMI. on HM NSR. IV placed, labs drawn and sent. RR even and unlabored, Lungs clear. Pt on room air. cardiology at bedside. pt placed on STEMI pads. IV placed, labs drawn and sent. will cont to monitor.

## 2018-06-25 NOTE — H&P ADULT - ASSESSMENT
62 y/o M with hx of HTN, DM, ?COPD [per chart review, patient unsure], Aortic insufficiency presents with SOB and cough x 1-2 weeks. admitted for SOB

## 2018-06-26 LAB
BASOPHILS # BLD AUTO: 0.03 K/UL — SIGNIFICANT CHANGE UP (ref 0–0.2)
BASOPHILS NFR BLD AUTO: 0.3 % — SIGNIFICANT CHANGE UP (ref 0–2)
BUN SERPL-MCNC: 15 MG/DL — SIGNIFICANT CHANGE UP (ref 7–23)
CALCIUM SERPL-MCNC: 9.5 MG/DL — SIGNIFICANT CHANGE UP (ref 8.4–10.5)
CHLORIDE SERPL-SCNC: 104 MMOL/L — SIGNIFICANT CHANGE UP (ref 98–107)
CHOLEST SERPL-MCNC: 196 MG/DL — SIGNIFICANT CHANGE UP (ref 120–199)
CK MB BLD-MCNC: 2.62 NG/ML — SIGNIFICANT CHANGE UP (ref 1–6.6)
CK MB BLD-MCNC: SIGNIFICANT CHANGE UP (ref 0–2.5)
CK SERPL-CCNC: 145 U/L — SIGNIFICANT CHANGE UP (ref 30–200)
CO2 SERPL-SCNC: 26 MMOL/L — SIGNIFICANT CHANGE UP (ref 22–31)
CREAT SERPL-MCNC: 1.21 MG/DL — SIGNIFICANT CHANGE UP (ref 0.5–1.3)
EOSINOPHIL # BLD AUTO: 0.22 K/UL — SIGNIFICANT CHANGE UP (ref 0–0.5)
EOSINOPHIL NFR BLD AUTO: 2.2 % — SIGNIFICANT CHANGE UP (ref 0–6)
GLUCOSE SERPL-MCNC: 120 MG/DL — HIGH (ref 70–99)
HCT VFR BLD CALC: 41.4 % — SIGNIFICANT CHANGE UP (ref 39–50)
HDLC SERPL-MCNC: 32 MG/DL — LOW (ref 35–55)
HGB BLD-MCNC: 14.1 G/DL — SIGNIFICANT CHANGE UP (ref 13–17)
IMM GRANULOCYTES # BLD AUTO: 0.03 # — SIGNIFICANT CHANGE UP
IMM GRANULOCYTES NFR BLD AUTO: 0.3 % — SIGNIFICANT CHANGE UP (ref 0–1.5)
LIPID PNL WITH DIRECT LDL SERPL: 147 MG/DL — SIGNIFICANT CHANGE UP
LYMPHOCYTES # BLD AUTO: 1.59 K/UL — SIGNIFICANT CHANGE UP (ref 1–3.3)
LYMPHOCYTES # BLD AUTO: 15.9 % — SIGNIFICANT CHANGE UP (ref 13–44)
MAGNESIUM SERPL-MCNC: 2 MG/DL — SIGNIFICANT CHANGE UP (ref 1.6–2.6)
MCHC RBC-ENTMCNC: 28.5 PG — SIGNIFICANT CHANGE UP (ref 27–34)
MCHC RBC-ENTMCNC: 34.1 % — SIGNIFICANT CHANGE UP (ref 32–36)
MCV RBC AUTO: 83.8 FL — SIGNIFICANT CHANGE UP (ref 80–100)
MONOCYTES # BLD AUTO: 0.76 K/UL — SIGNIFICANT CHANGE UP (ref 0–0.9)
MONOCYTES NFR BLD AUTO: 7.6 % — SIGNIFICANT CHANGE UP (ref 2–14)
NEUTROPHILS # BLD AUTO: 7.35 K/UL — SIGNIFICANT CHANGE UP (ref 1.8–7.4)
NEUTROPHILS NFR BLD AUTO: 73.7 % — SIGNIFICANT CHANGE UP (ref 43–77)
NRBC # FLD: 0 — SIGNIFICANT CHANGE UP
PHOSPHATE SERPL-MCNC: 3.2 MG/DL — SIGNIFICANT CHANGE UP (ref 2.5–4.5)
PLATELET # BLD AUTO: 199 K/UL — SIGNIFICANT CHANGE UP (ref 150–400)
PMV BLD: 10.9 FL — SIGNIFICANT CHANGE UP (ref 7–13)
POTASSIUM SERPL-MCNC: 3.9 MMOL/L — SIGNIFICANT CHANGE UP (ref 3.5–5.3)
POTASSIUM SERPL-SCNC: 3.9 MMOL/L — SIGNIFICANT CHANGE UP (ref 3.5–5.3)
RBC # BLD: 4.94 M/UL — SIGNIFICANT CHANGE UP (ref 4.2–5.8)
RBC # FLD: 13.9 % — SIGNIFICANT CHANGE UP (ref 10.3–14.5)
SODIUM SERPL-SCNC: 143 MMOL/L — SIGNIFICANT CHANGE UP (ref 135–145)
TRIGL SERPL-MCNC: 119 MG/DL — SIGNIFICANT CHANGE UP (ref 10–149)
TROPONIN T, HIGH SENSITIVITY: 21 NG/L — SIGNIFICANT CHANGE UP (ref ?–14)
TSH SERPL-MCNC: 1.24 UIU/ML — SIGNIFICANT CHANGE UP (ref 0.27–4.2)
WBC # BLD: 9.98 K/UL — SIGNIFICANT CHANGE UP (ref 3.8–10.5)
WBC # FLD AUTO: 9.98 K/UL — SIGNIFICANT CHANGE UP (ref 3.8–10.5)

## 2018-06-26 RX ORDER — IPRATROPIUM/ALBUTEROL SULFATE 18-103MCG
3 AEROSOL WITH ADAPTER (GRAM) INHALATION ONCE
Qty: 0 | Refills: 0 | Status: COMPLETED | OUTPATIENT
Start: 2018-06-26 | End: 2018-06-26

## 2018-06-26 RX ORDER — LISINOPRIL 2.5 MG/1
20 TABLET ORAL DAILY
Qty: 0 | Refills: 0 | Status: DISCONTINUED | OUTPATIENT
Start: 2018-06-26 | End: 2018-06-27

## 2018-06-26 RX ORDER — ACETAMINOPHEN 500 MG
650 TABLET ORAL EVERY 6 HOURS
Qty: 0 | Refills: 0 | Status: DISCONTINUED | OUTPATIENT
Start: 2018-06-26 | End: 2018-06-28

## 2018-06-26 RX ORDER — LISINOPRIL 2.5 MG/1
20 TABLET ORAL DAILY
Qty: 0 | Refills: 0 | Status: DISCONTINUED | OUTPATIENT
Start: 2018-06-26 | End: 2018-06-26

## 2018-06-26 RX ADMIN — Medication 650 MILLIGRAM(S): at 06:44

## 2018-06-26 RX ADMIN — HEPARIN SODIUM 5000 UNIT(S): 5000 INJECTION INTRAVENOUS; SUBCUTANEOUS at 13:04

## 2018-06-26 RX ADMIN — HEPARIN SODIUM 5000 UNIT(S): 5000 INJECTION INTRAVENOUS; SUBCUTANEOUS at 05:37

## 2018-06-26 RX ADMIN — SODIUM CHLORIDE 3 MILLILITER(S): 9 INJECTION INTRAMUSCULAR; INTRAVENOUS; SUBCUTANEOUS at 05:27

## 2018-06-26 RX ADMIN — BUDESONIDE AND FORMOTEROL FUMARATE DIHYDRATE 2 PUFF(S): 160; 4.5 AEROSOL RESPIRATORY (INHALATION) at 23:09

## 2018-06-26 RX ADMIN — TIOTROPIUM BROMIDE 1 CAPSULE(S): 18 CAPSULE ORAL; RESPIRATORY (INHALATION) at 13:04

## 2018-06-26 RX ADMIN — Medication 500 MILLIGRAM(S): at 18:12

## 2018-06-26 RX ADMIN — HEPARIN SODIUM 5000 UNIT(S): 5000 INJECTION INTRAVENOUS; SUBCUTANEOUS at 23:09

## 2018-06-26 RX ADMIN — Medication 3 MILLILITER(S): at 01:39

## 2018-06-26 RX ADMIN — BUDESONIDE AND FORMOTEROL FUMARATE DIHYDRATE 2 PUFF(S): 160; 4.5 AEROSOL RESPIRATORY (INHALATION) at 07:52

## 2018-06-26 RX ADMIN — Medication 0.2 MILLIGRAM(S): at 06:44

## 2018-06-26 RX ADMIN — Medication 40 MILLIGRAM(S): at 05:37

## 2018-06-26 RX ADMIN — Medication 0.2 MILLIGRAM(S): at 18:12

## 2018-06-26 RX ADMIN — INSULIN GLARGINE 60 UNIT(S): 100 INJECTION, SOLUTION SUBCUTANEOUS at 07:51

## 2018-06-26 RX ADMIN — SODIUM CHLORIDE 3 MILLILITER(S): 9 INJECTION INTRAMUSCULAR; INTRAVENOUS; SUBCUTANEOUS at 23:10

## 2018-06-26 RX ADMIN — Medication 500 MILLIGRAM(S): at 05:37

## 2018-06-26 RX ADMIN — PREGABALIN 1000 MICROGRAM(S): 225 CAPSULE ORAL at 13:04

## 2018-06-26 RX ADMIN — SODIUM CHLORIDE 3 MILLILITER(S): 9 INJECTION INTRAMUSCULAR; INTRAVENOUS; SUBCUTANEOUS at 14:00

## 2018-06-26 RX ADMIN — Medication 650 MILLIGRAM(S): at 07:39

## 2018-06-26 NOTE — PROGRESS NOTE ADULT - ASSESSMENT
63 year old man with history of Hypertension, DM, COPD, Aotic stenosis  admitted with increased dyspnea and cough for the past 2 weeks.      1. Dyspnea, cough  likely related and secondary to volume overload secondary to acute on chronic diastolic HF in setting of uncontrolled HTN  ? viral process   ct chest reviewed  pulm eval noted, nebs per pulm  cont diuresis as per cards  check echo       2. hypertension  bp uncontrolled  cont home meds  adjust meds per Cardio    3. Abnl EKG  likely sec to lvh  recent stress nl  trend ce's    4. Aortic insuff  mod and stable on recent echo  echo  dvt ppx      5. DM2  fsg riss  hold oral meds  diabetic diet  hga1c 7.1    DVT prophylaxis

## 2018-06-26 NOTE — CONSULT NOTE ADULT - PROBLEM SELECTOR RECOMMENDATION 9
pt seems to have copd: His ct scanch est shows no pneumonia: to me he seems to have emphysema on chest ct: ther eis no offical report yeat: He has cough for past few days without fever: no phlegm: his rvp  is negative too: he has been on symbicort as well as psiriva: He is not wheezing: if his cough does not resolve he may need short course of steroids:  his breathing is already better: HIs SOB is likely contributed by his cardiac status:

## 2018-06-26 NOTE — PHYSICAL THERAPY INITIAL EVALUATION ADULT - ADDITIONAL COMMENTS
Pt. was left seated at EOB post PT Evaluation, NAD. CANDI Stroud made aware of pt. status and participation in PT.

## 2018-06-26 NOTE — PHYSICAL THERAPY INITIAL EVALUATION ADULT - DISCHARGE DISPOSITION, PT EVAL
Home with no skilled PT needs. Pt. presents without gross impairment and is independent with functional mobility. Skilled, therapeutic PT intervention not indicated at this time. Pt. will be d/c from PT program.

## 2018-06-26 NOTE — PHYSICAL THERAPY INITIAL EVALUATION ADULT - PERTINENT HX OF CURRENT PROBLEM, REHAB EVAL
Pt. admitted for SOB and cough. PMH of HTN, DM, ?COPD [per chart review, patient unsure], Aortic insufficiency.

## 2018-06-26 NOTE — CONSULT NOTE ADULT - SUBJECTIVE AND OBJECTIVE BOX
Patient is a 63y old  Male who presents with a chief complaint of SOBand cough (25 Jun 2018 14:49)      HPI:  62 y/o M with hx of HTN, DM, ?COPD [per chart review, patient unsure], Aortic insufficiency presents with SOB and cough x 1-2 weeks. SOB is intermittent and usually worse when he tries to lift and tries to do certain activities. He does not recall any SOB with exertion or when he lies on bed. He also complains of clear-green sputum cough x 2 weeks. denies fever, chills, falls, LOC, chest pain, abdominal pain, nausea, vomiting, melena, hematochezia, LE edema, dysuria, diarrhea, constipation or calf tenderness.     Patient had recent stress and echo at Dr. South's office (25 Jun 2018 14:49)    he says he doento have any pulmonry diseases but have been  using symbicort forpast year for cough: He dnies any wheezing and never had and DVT or PE       ?FOLLOWING PRESENT  [x ] Hx of PE/DVT, [ ?] Hx COPD, [? ] Hx of Asthma, [ x] Hx of Hospitalization, [ yes: 12]  Hx of BiPAP/CPAP use, [ y] Hx of LING    Allergies    No Known Allergies    Intolerances        PAST MEDICAL & SURGICAL HISTORY:  Heart murmur: recent dx  Cholelithiasis  COPD (chronic obstructive pulmonary disease)  Diabetes: fs this am 120  Hypertension  S/P exploratory laparotomy: 23 years ago      FAMILY HISTORY:  No pertinent family history in first degree relatives      Social History: [  20 pk years: quit many years ago ] TOBACCO                  [ x ] ETOH                                 [x ] IVDA/DRUGS    REVIEW OF SYSTEMS    x  General:	  x  Skin/Breast:  	  Ophthalmologic:x  	  ENMT:	x    Respiratory and Thorax: cough, sob   	  Cardiovascular:	x    Gastrointestinal:	x    Genitourinary:	x    Musculoskeletal:	x    Neurological:	x    Psychiatric:	x    Hematology/Lymphatics:	x    Endocrine:	x    Allergic/Immunologic:	x    MEDICATIONS  (STANDING):  buDESOnide 160 MICROgram(s)/formoterol 4.5 MICROgram(s) Inhaler 2 Puff(s) Inhalation two times a day  cloNIDine 0.2 milliGRAM(s) Oral two times a day  cyanocobalamin 1000 MICROGram(s) Oral daily  dextrose 5%. 1000 milliLiter(s) (50 mL/Hr) IV Continuous <Continuous>  dextrose 50% Injectable 12.5 Gram(s) IV Push once  dextrose 50% Injectable 25 Gram(s) IV Push once  dextrose 50% Injectable 25 Gram(s) IV Push once  furosemide   Injectable 40 milliGRAM(s) IV Push daily  heparin  Injectable 5000 Unit(s) SubCutaneous every 8 hours  insulin glargine Injectable (LANTUS) 60 Unit(s) SubCutaneous every morning  insulin lispro (HumaLOG) corrective regimen sliding scale   SubCutaneous three times a day before meals  labetalol 500 milliGRAM(s) Oral two times a day  sodium chloride 0.9% lock flush 3 milliLiter(s) IV Push every 8 hours  tiotropium 18 MICROgram(s) Capsule 1 Capsule(s) Inhalation daily    MEDICATIONS  (PRN):  acetaminophen   Tablet. 650 milliGRAM(s) Oral every 6 hours PRN mild, moderate , severe pain  dextrose 40% Gel 15 Gram(s) Oral once PRN Blood Glucose LESS THAN 70 milliGRAM(s)/deciliter  glucagon  Injectable 1 milliGRAM(s) IntraMuscular once PRN Glucose LESS THAN 70 milligrams/deciliter       Vital Signs Last 24 Hrs  T(C): 36.5 (26 Jun 2018 05:34), Max: 37 (25 Jun 2018 20:28)  T(F): 97.7 (26 Jun 2018 05:34), Max: 98.6 (25 Jun 2018 20:28)  HR: 77 (26 Jun 2018 05:34) (71 - 95)  BP: 169/87 (26 Jun 2018 05:34) (156/87 - 226/123)  BP(mean): --  RR: 19 (26 Jun 2018 05:34) (14 - 22)  SpO2: 99% (26 Jun 2018 05:34) (95% - 100%)        I&O's Summary    25 Jun 2018 07:01  -  26 Jun 2018 07:00  --------------------------------------------------------  IN: 0 mL / OUT: 450 mL / NET: -450 mL        Physical Exam:   GENERAL: NAD, well-groomed, well-developed  HEENT: PABLITO/   Atraumatic, Normocephalic  ENMT: No tonsillar erythema, exudates, or enlargement; Moist mucous membranes, Good dentition, No lesions  NECK: Supple, No JVD, Normal thyroid  CHEST/LUNG: Clear to auscultation bilaterally; No rales, rhonchi, wheezing, or rubs  CVS: Regular rate and rhythm; No murmurs, rubs, or gallops  GI: : Soft, Nontender, Nondistended; Bowel sounds present  NERVOUS SYSTEM:  Alert & Oriented X3  EXTREMITIES:  2+ Peripheral Pulses, No clubbing, cyanosis, or edema  LYMPH: No lymphadenopathy noted  SKIN: No rashes or lesions  ENDOCRINOLOGY: No Thyromegaly  PSYCH: Appropriate    Labs:    CARDIAC MARKERS ( 26 Jun 2018 06:04 )  x     / x     / 145 u/L / 2.62 ng/mL / x                                14.1   9.98  )-----------( 199      ( 26 Jun 2018 06:04 )             41.4                         13.6   9.24  )-----------( 193      ( 25 Jun 2018 11:57 )             42.3     06-26    143  |  104  |  15  ----------------------------<  120<H>  3.9   |  26  |  1.21  06-25    144  |  106  |  15  ----------------------------<  117<H>  4.5   |  25  |  1.25    Ca    9.5      26 Jun 2018 06:04  Ca    9.2      25 Jun 2018 11:57  Phos  3.2     06-26  Mg     2.0     06-26    TPro  7.1  /  Alb  4.0  /  TBili  0.3  /  DBili  x   /  AST  18  /  ALT  14  /  AlkPhos  73  06-25    CAPILLARY BLOOD GLUCOSE      POCT Blood Glucose.: 90 mg/dL (26 Jun 2018 07:40)  POCT Blood Glucose.: 84 mg/dL (25 Jun 2018 23:11)  POCT Blood Glucose.: 73 mg/dL (25 Jun 2018 18:34)    LIVER FUNCTIONS - ( 25 Jun 2018 11:57 )  Alb: 4.0 g/dL / Pro: 7.1 g/dL / ALK PHOS: 73 u/L / ALT: 14 u/L / AST: 18 u/L / GGT: x           PT/INR - ( 25 Jun 2018 11:57 )   PT: 11.8 SEC;   INR: 1.06          PTT - ( 25 Jun 2018 11:57 )  PTT:31.5 SEC    D DImer      Studies  Chest X-RAY  CT SCAN Chest   CT Abdomen  Venous Dopplers: LE:   Others    < from: CT Chest No Cont (06.25.18 @ 16:57) >    ******PRELIMINARY REPORT******    ******PRELIMINARY REPORT******            EXAM:  CT CHEST        PROCEDURE DATE:  Jun 25 2018     ******PRELIMINARY REPORT******    ******PRELIMINARY REPORT******            INTERPRETATION:  right pleural thickening. trace left pleural effusion.            ******PRELIMINARY REPORT******    ******PRELIMINARY REPORT******          KEYSHAWN PAIZ M.D., RADIOLOGY RESIDENT    < end of copied text >          < from: Transthoracic Echocardiogram (12.23.13 @ 07:21) >  Mild diastolic dysfunction (Stage I).  Right Heart: Normal right atrium.  Normal right ventricular size and function.  Normal tricuspid valve. Minimal tricuspid regurgitation.  Normal pulmonic valve.  Estimated pulmonary artery systolic pressure equals 41 mm  Hg, assuming right atrial pressure equals 10  mm Hg,  consistent with mild pulmonary hypertension.  Pericardium/PleuraNormal pericardium with no pericardial  effusion.  ------------------------------------------------------------------------  CONCLUSIONS:  1. Normal trileaflet aortic valve. Moderate aortic  regurgitation.  2. Severely dilated left atrium.  LA volume index = 44  cc/m2.  3. Mild concentric left ventricular hypertrophy.  4. Normal left ventricular systolic function. No segmental  wall motion abnormalities.  5. Mild diastolic dysfunction (Stage I).  ------------------------------------------------------------------------  Confirmed on  12/23/2013 - 14:26:11 by Robin Martinez M.D.  ------------------------------------------------------------------------    < end of copied text >

## 2018-06-26 NOTE — PROGRESS NOTE ADULT - ASSESSMENT
recent stress echo normal   recent echo 4/2018 EF 65%, mod AI, mod lvh, costello dysfxn  CT chest (6/25/18) trace b/l pleural effusions, emphysema     A/P    63 year old man with history of Hypertension, DM, COPD, AI admitted with increased dyspnea and cough for the past 2 weeks.      1. Dyspnea, cough  likely related and secondary to volume overload secondary to acute on chronic diastolic HF in setting of uncontrolled HTN  RVP negative   CT revealing trace b/l pleural effusions, emphysema   cont diuresis with iv lasix daily, likely change to PO tomorrow   check echo   bp control    2. hypertension  bp slightly improved with increased labetalol dose  trend bp  inc labetalol as needed    3. Abnl EKG  likely sec to lvh  recent stress nl  trend ce's    4. AI  mod and stable on recent echo  recheck echo       dvt ppx

## 2018-06-27 ENCOUNTER — TRANSCRIPTION ENCOUNTER (OUTPATIENT)
Age: 63
End: 2018-06-27

## 2018-06-27 LAB
BASOPHILS # BLD AUTO: 0.02 K/UL — SIGNIFICANT CHANGE UP (ref 0–0.2)
BASOPHILS NFR BLD AUTO: 0.2 % — SIGNIFICANT CHANGE UP (ref 0–2)
BUN SERPL-MCNC: 17 MG/DL — SIGNIFICANT CHANGE UP (ref 7–23)
CALCIUM SERPL-MCNC: 9.6 MG/DL — SIGNIFICANT CHANGE UP (ref 8.4–10.5)
CHLORIDE SERPL-SCNC: 105 MMOL/L — SIGNIFICANT CHANGE UP (ref 98–107)
CO2 SERPL-SCNC: 22 MMOL/L — SIGNIFICANT CHANGE UP (ref 22–31)
CREAT SERPL-MCNC: 1.1 MG/DL — SIGNIFICANT CHANGE UP (ref 0.5–1.3)
EOSINOPHIL # BLD AUTO: 0.21 K/UL — SIGNIFICANT CHANGE UP (ref 0–0.5)
EOSINOPHIL NFR BLD AUTO: 2 % — SIGNIFICANT CHANGE UP (ref 0–6)
GLUCOSE SERPL-MCNC: 67 MG/DL — LOW (ref 70–99)
HCT VFR BLD CALC: 43.6 % — SIGNIFICANT CHANGE UP (ref 39–50)
HGB BLD-MCNC: 14.4 G/DL — SIGNIFICANT CHANGE UP (ref 13–17)
IMM GRANULOCYTES # BLD AUTO: 0.03 # — SIGNIFICANT CHANGE UP
IMM GRANULOCYTES NFR BLD AUTO: 0.3 % — SIGNIFICANT CHANGE UP (ref 0–1.5)
LYMPHOCYTES # BLD AUTO: 1.89 K/UL — SIGNIFICANT CHANGE UP (ref 1–3.3)
LYMPHOCYTES # BLD AUTO: 18.3 % — SIGNIFICANT CHANGE UP (ref 13–44)
MAGNESIUM SERPL-MCNC: 2.2 MG/DL — SIGNIFICANT CHANGE UP (ref 1.6–2.6)
MCHC RBC-ENTMCNC: 28.6 PG — SIGNIFICANT CHANGE UP (ref 27–34)
MCHC RBC-ENTMCNC: 33 % — SIGNIFICANT CHANGE UP (ref 32–36)
MCV RBC AUTO: 86.7 FL — SIGNIFICANT CHANGE UP (ref 80–100)
MONOCYTES # BLD AUTO: 0.83 K/UL — SIGNIFICANT CHANGE UP (ref 0–0.9)
MONOCYTES NFR BLD AUTO: 8.1 % — SIGNIFICANT CHANGE UP (ref 2–14)
NEUTROPHILS # BLD AUTO: 7.32 K/UL — SIGNIFICANT CHANGE UP (ref 1.8–7.4)
NEUTROPHILS NFR BLD AUTO: 71.1 % — SIGNIFICANT CHANGE UP (ref 43–77)
NRBC # FLD: 0 — SIGNIFICANT CHANGE UP
PLATELET # BLD AUTO: 210 K/UL — SIGNIFICANT CHANGE UP (ref 150–400)
PMV BLD: 10.7 FL — SIGNIFICANT CHANGE UP (ref 7–13)
POTASSIUM SERPL-MCNC: 3.4 MMOL/L — LOW (ref 3.5–5.3)
POTASSIUM SERPL-SCNC: 3.4 MMOL/L — LOW (ref 3.5–5.3)
RBC # BLD: 5.03 M/UL — SIGNIFICANT CHANGE UP (ref 4.2–5.8)
RBC # FLD: 13.9 % — SIGNIFICANT CHANGE UP (ref 10.3–14.5)
SODIUM SERPL-SCNC: 144 MMOL/L — SIGNIFICANT CHANGE UP (ref 135–145)
WBC # BLD: 10.3 K/UL — SIGNIFICANT CHANGE UP (ref 3.8–10.5)
WBC # FLD AUTO: 10.3 K/UL — SIGNIFICANT CHANGE UP (ref 3.8–10.5)

## 2018-06-27 RX ORDER — LISINOPRIL 2.5 MG/1
40 TABLET ORAL DAILY
Qty: 0 | Refills: 0 | Status: DISCONTINUED | OUTPATIENT
Start: 2018-06-27 | End: 2018-06-28

## 2018-06-27 RX ORDER — FUROSEMIDE 40 MG
1 TABLET ORAL
Qty: 0 | Refills: 0 | COMMUNITY

## 2018-06-27 RX ORDER — HYDRALAZINE HCL 50 MG
10 TABLET ORAL ONCE
Qty: 0 | Refills: 0 | Status: COMPLETED | OUTPATIENT
Start: 2018-06-27 | End: 2018-06-27

## 2018-06-27 RX ORDER — AMLODIPINE BESYLATE 2.5 MG/1
10 TABLET ORAL
Qty: 0 | Refills: 0 | Status: DISCONTINUED | OUTPATIENT
Start: 2018-06-28 | End: 2018-06-28

## 2018-06-27 RX ORDER — FUROSEMIDE 40 MG
1 TABLET ORAL
Qty: 30 | Refills: 0
Start: 2018-06-27 | End: 2018-07-26

## 2018-06-27 RX ORDER — POTASSIUM CHLORIDE 20 MEQ
40 PACKET (EA) ORAL EVERY 4 HOURS
Qty: 0 | Refills: 0 | Status: COMPLETED | OUTPATIENT
Start: 2018-06-27 | End: 2018-06-27

## 2018-06-27 RX ORDER — LISINOPRIL 2.5 MG/1
1 TABLET ORAL
Qty: 30 | Refills: 0
Start: 2018-06-27 | End: 2018-07-26

## 2018-06-27 RX ORDER — AMLODIPINE BESYLATE 2.5 MG/1
10 TABLET ORAL ONCE
Qty: 0 | Refills: 0 | Status: COMPLETED | OUTPATIENT
Start: 2018-06-27 | End: 2018-06-27

## 2018-06-27 RX ORDER — LISINOPRIL 2.5 MG/1
20 TABLET ORAL ONCE
Qty: 0 | Refills: 0 | Status: COMPLETED | OUTPATIENT
Start: 2018-06-27 | End: 2018-06-27

## 2018-06-27 RX ADMIN — PREGABALIN 1000 MICROGRAM(S): 225 CAPSULE ORAL at 14:12

## 2018-06-27 RX ADMIN — Medication 0.2 MILLIGRAM(S): at 18:31

## 2018-06-27 RX ADMIN — HEPARIN SODIUM 5000 UNIT(S): 5000 INJECTION INTRAVENOUS; SUBCUTANEOUS at 14:11

## 2018-06-27 RX ADMIN — HEPARIN SODIUM 5000 UNIT(S): 5000 INJECTION INTRAVENOUS; SUBCUTANEOUS at 06:23

## 2018-06-27 RX ADMIN — HEPARIN SODIUM 5000 UNIT(S): 5000 INJECTION INTRAVENOUS; SUBCUTANEOUS at 21:17

## 2018-06-27 RX ADMIN — TIOTROPIUM BROMIDE 1 CAPSULE(S): 18 CAPSULE ORAL; RESPIRATORY (INHALATION) at 09:14

## 2018-06-27 RX ADMIN — Medication 40 MILLIGRAM(S): at 06:23

## 2018-06-27 RX ADMIN — Medication 500 MILLIGRAM(S): at 06:23

## 2018-06-27 RX ADMIN — LISINOPRIL 20 MILLIGRAM(S): 2.5 TABLET ORAL at 01:23

## 2018-06-27 RX ADMIN — Medication 0.2 MILLIGRAM(S): at 06:23

## 2018-06-27 RX ADMIN — SODIUM CHLORIDE 3 MILLILITER(S): 9 INJECTION INTRAMUSCULAR; INTRAVENOUS; SUBCUTANEOUS at 06:20

## 2018-06-27 RX ADMIN — INSULIN GLARGINE 60 UNIT(S): 100 INJECTION, SOLUTION SUBCUTANEOUS at 09:14

## 2018-06-27 RX ADMIN — SODIUM CHLORIDE 3 MILLILITER(S): 9 INJECTION INTRAMUSCULAR; INTRAVENOUS; SUBCUTANEOUS at 21:20

## 2018-06-27 RX ADMIN — LISINOPRIL 40 MILLIGRAM(S): 2.5 TABLET ORAL at 21:17

## 2018-06-27 RX ADMIN — BUDESONIDE AND FORMOTEROL FUMARATE DIHYDRATE 2 PUFF(S): 160; 4.5 AEROSOL RESPIRATORY (INHALATION) at 08:15

## 2018-06-27 RX ADMIN — BUDESONIDE AND FORMOTEROL FUMARATE DIHYDRATE 2 PUFF(S): 160; 4.5 AEROSOL RESPIRATORY (INHALATION) at 21:20

## 2018-06-27 RX ADMIN — Medication 40 MILLIEQUIVALENT(S): at 11:33

## 2018-06-27 RX ADMIN — AMLODIPINE BESYLATE 10 MILLIGRAM(S): 2.5 TABLET ORAL at 21:17

## 2018-06-27 RX ADMIN — Medication 500 MILLIGRAM(S): at 18:31

## 2018-06-27 RX ADMIN — Medication 40 MILLIEQUIVALENT(S): at 14:12

## 2018-06-27 RX ADMIN — Medication 10 MILLIGRAM(S): at 03:47

## 2018-06-27 RX ADMIN — LISINOPRIL 20 MILLIGRAM(S): 2.5 TABLET ORAL at 14:10

## 2018-06-27 RX ADMIN — SODIUM CHLORIDE 3 MILLILITER(S): 9 INJECTION INTRAMUSCULAR; INTRAVENOUS; SUBCUTANEOUS at 14:36

## 2018-06-27 NOTE — DISCHARGE NOTE ADULT - CARE PLAN
Principal Discharge DX:	Acute on chronic diastolic congestive heart failure  Goal:	remain euvolemic and medication compliance  Assessment and plan of treatment:	Low salt diet, fluid restriction to 1500 ml daily, monitor your fluid intake and weight daily and follow up with your physician within 1 to 2 weeks.  Secondary Diagnosis:	Hypertensive urgency  Assessment and plan of treatment:	Low sodium and fat diet, continue anti-hypertensive medications, and follow up with primary care physician.  Secondary Diagnosis:	COPD (chronic obstructive pulmonary disease)  Assessment and plan of treatment:	Smoking cessation, continue current medications as prescribed. Follow up with your routine physician's appointments.  Secondary Diagnosis:	Diabetes mellitus, type 2  Assessment and plan of treatment:	Monitor finger sticks pre-meal and bedtime, low salt, fat and carbohydrate diet, minimize glucose intake.  Exercise daily for at least 30 minutes and weight loss.  Follow up with primary care physician and endocrinologist for routine Hemoglobin A1C checks and management.  Follow up with your ophthalmologist for routine yearly vision exams.

## 2018-06-27 NOTE — DISCHARGE NOTE ADULT - MEDICATION SUMMARY - MEDICATIONS TO TAKE
I will START or STAY ON the medications listed below when I get home from the hospital:    clonidine 0.1 mg oral tablet  -- 2 tab(s) by mouth 2 times a day  -- Indication: For Hypertensive urgency    metFORMIN 1000 mg oral tablet  -- 1 tab(s) by mouth 2 times a day  -- Indication: For Diabetes mellitus, type 2    Toujeo SoloStar 300 units/mL subcutaneous solution  -- 60 unit(s) subcutaneous once a day (at bedtime)  -- Indication: For Diabetes mellitus, type 2    labetalol 200 mg oral tablet  -- 2.5 tab(s) by mouth 2 times a day  -- Indication: For Hypertensive urgency    Symbicort 160 mcg-4.5 mcg/inh inhalation aerosol  -- 2 puff(s) inhaled 2 times a day  -- Indication: For COPD (chronic obstructive pulmonary disease)    Spiriva 18 mcg inhalation capsule  -- 1 each inhaled once a day  -- Indication: For COPD (chronic obstructive pulmonary disease)    Vitamin D2 50,000 intl units (1.25 mg) oral capsule  -- 1 cap(s) by mouth once a week  -- Indication: For Supplement     Vitamin B12 1000 mcg oral tablet  -- 1 tab(s) by mouth once a day  -- Indication: For Supplement I will START or STAY ON the medications listed below when I get home from the hospital:    lisinopril 40 mg oral tablet  -- 1 tab(s) by mouth once a day  -- Indication: For HTN    clonidine 0.1 mg oral tablet  -- 2 tab(s) by mouth 2 times a day  -- Indication: For Hypertensive urgency    metFORMIN 1000 mg oral tablet  -- 1 tab(s) by mouth 2 times a day  -- Indication: For Diabetes mellitus, type 2    Toujeo SoloStar 300 units/mL subcutaneous solution  -- 60 unit(s) subcutaneous once a day (at bedtime)  -- Indication: For Diabetes mellitus, type 2    labetalol 200 mg oral tablet  -- 2.5 tab(s) by mouth 2 times a day  -- Indication: For Hypertensive urgency    Symbicort 160 mcg-4.5 mcg/inh inhalation aerosol  -- 2 puff(s) inhaled 2 times a day  -- Indication: For COPD (chronic obstructive pulmonary disease)    Spiriva 18 mcg inhalation capsule  -- 1 each inhaled once a day  -- Indication: For COPD (chronic obstructive pulmonary disease)    amLODIPine 10 mg oral tablet  -- 1 tab(s) by mouth once a day   -- Indication: For HTN    furosemide 40 mg oral tablet  -- 1 tab(s) by mouth once a day  -- Indication: For CHF    Vitamin D2 50,000 intl units (1.25 mg) oral capsule  -- 1 cap(s) by mouth once a week  -- Indication: For Supplement     Vitamin B12 1000 mcg oral tablet  -- 1 tab(s) by mouth once a day  -- Indication: For Supplement I will START or STAY ON the medications listed below when I get home from the hospital:    lisinopril 40 mg oral tablet  -- 1 tab(s) by mouth once a day  -- Indication: For HTN    clonidine 0.1 mg oral tablet  -- 2 tab(s) by mouth 2 times a day  -- Indication: For Hypertensive urgency    metFORMIN 1000 mg oral tablet  -- 1 tab(s) by mouth 2 times a day  -- Indication: For Diabetes mellitus, type 2    Toujeo SoloStar 300 units/mL subcutaneous solution  -- 60 unit(s) subcutaneous once a day (at bedtime)  -- Indication: For Diabetes mellitus, type 2    labetalol 100 mg oral tablet  -- 5 tab(s) by mouth 2 times a day  -- Indication: For HTN    Symbicort 160 mcg-4.5 mcg/inh inhalation aerosol  -- 2 puff(s) inhaled 2 times a day  -- Indication: For COPD (chronic obstructive pulmonary disease)    Spiriva 18 mcg inhalation capsule  -- 1 each inhaled once a day  -- Indication: For COPD (chronic obstructive pulmonary disease)    amLODIPine 10 mg oral tablet  -- 1 tab(s) by mouth once a day   -- Indication: For HTN    furosemide 40 mg oral tablet  -- 1 tab(s) by mouth once a day  -- Indication: For CHF    Vitamin D2 50,000 intl units (1.25 mg) oral capsule  -- 1 cap(s) by mouth once a week  -- Indication: For Supplement     Vitamin B12 1000 mcg oral tablet  -- 1 tab(s) by mouth once a day  -- Indication: For Supplement

## 2018-06-27 NOTE — DISCHARGE NOTE ADULT - PATIENT PORTAL LINK FT
You can access the [facility] Patient Portal, offered by [facility], by registering with the following website: [email or web address]

## 2018-06-27 NOTE — CHART NOTE - NSCHARTNOTEFT_GEN_A_CORE
Uncontrolled HTN - pt asymptomatic - on multiple antihypertensive medications - Clonidine, Lasix IV, Labetolol, &  Lisinopril 20 mg PO qd added was given   repeat BP remained uncontrolled Hydralazine 10 mg IVP x 1 dose given for BP control  pt stable will continue to monitor       Vital Signs Last 24 Hrs  T(C): 36.7 (26 Jun 2018 23:11), Max: 36.7 (26 Jun 2018 13:34)  T(F): 98.1 (26 Jun 2018 23:11), Max: 98.1 (26 Jun 2018 13:34)  HR: 69 (27 Jun 2018 02:17) (69 - 101)  BP: 198/113 (27 Jun 2018 02:17) (169/87 - 198/113)  RR: 17 (26 Jun 2018 23:11) (17 - 19)  SpO2: 94% (27 Jun 2018 01:19) (93% - 99%)      No Known Allergies        MEDICATIONS  (STANDING):  buDESOnide 160 MICROgram(s)/formoterol 4.5 MICROgram(s) Inhaler 2 Puff(s) Inhalation two times a day  cloNIDine 0.2 milliGRAM(s) Oral two times a day  cyanocobalamin 1000 MICROGram(s) Oral daily  dextrose 5%. 1000 milliLiter(s) (50 mL/Hr) IV Continuous <Continuous>  dextrose 50% Injectable 12.5 Gram(s) IV Push once  dextrose 50% Injectable 25 Gram(s) IV Push once  dextrose 50% Injectable 25 Gram(s) IV Push once  furosemide   Injectable 40 milliGRAM(s) IV Push daily  heparin  Injectable 5000 Unit(s) SubCutaneous every 8 hours  hydrALAZINE Injectable 10 milliGRAM(s) IV Push once  insulin glargine Injectable (LANTUS) 60 Unit(s) SubCutaneous every morning  insulin lispro (HumaLOG) corrective regimen sliding scale   SubCutaneous three times a day before meals  labetalol 500 milliGRAM(s) Oral two times a day  lisinopril 20 milliGRAM(s) Oral daily  sodium chloride 0.9% lock flush 3 milliLiter(s) IV Push every 8 hours  tiotropium 18 MICROgram(s) Capsule 1 Capsule(s) Inhalation daily    MEDICATIONS  (PRN):  acetaminophen   Tablet. 650 milliGRAM(s) Oral every 6 hours PRN mild, moderate , severe pain  dextrose 40% Gel 15 Gram(s) Oral once PRN Blood Glucose LESS THAN 70 milliGRAM(s)/deciliter  glucagon  Injectable 1 milliGRAM(s) IntraMuscular once PRN Glucose LESS THAN 70 milligrams/deciliter

## 2018-06-27 NOTE — DISCHARGE NOTE ADULT - PLAN OF CARE
remain euvolemic and medication compliance Low salt diet, fluid restriction to 1500 ml daily, monitor your fluid intake and weight daily and follow up with your physician within 1 to 2 weeks. Low sodium and fat diet, continue anti-hypertensive medications, and follow up with primary care physician. Smoking cessation, continue current medications as prescribed. Follow up with your routine physician's appointments. Monitor finger sticks pre-meal and bedtime, low salt, fat and carbohydrate diet, minimize glucose intake.  Exercise daily for at least 30 minutes and weight loss.  Follow up with primary care physician and endocrinologist for routine Hemoglobin A1C checks and management.  Follow up with your ophthalmologist for routine yearly vision exams.

## 2018-06-27 NOTE — DISCHARGE NOTE ADULT - MEDICATION SUMMARY - MEDICATIONS TO CHANGE
I will SWITCH the dose or number of times a day I take the medications listed below when I get home from the hospital:    Lasix 20 mg oral tablet  -- 1 tab(s) by mouth once a day I will SWITCH the dose or number of times a day I take the medications listed below when I get home from the hospital:    Lasix 20 mg oral tablet  -- 1 tab(s) by mouth once a day    labetalol 200 mg oral tablet  -- 2.5 tab(s) by mouth 2 times a day

## 2018-06-27 NOTE — CHART NOTE - NSCHARTNOTEFT_GEN_A_CORE
Patient was pending discharge today however pt's BP was noted to be significantly elevated, pt asymptomatic   Patient on multiple antihypertensive medications - Clonidine, Labetolol, Lasix, and Lisinopril (dose increased today)   Uncontrolled HTN - Norvasc 10 mg PO daily added to pt's regimen- one dose given this evening   Above was d/w Dr Rai South   pt stable, discharge cancelled  Will reassess       VITAL SIGNS:  Vital Signs Last 24 Hrs  T(C): 36.5 (27 Jun 2018 13:19), Max: 36.7 (26 Jun 2018 23:11)  T(F): 97.7 (27 Jun 2018 13:19), Max: 98.1 (26 Jun 2018 23:11)  HR: 81 (27 Jun 2018 20:30) (68 - 95)  BP: 190/103 (27 Jun 2018 20:30) (146/103 - 198/113)  BP(mean): --  RR: 18 (27 Jun 2018 13:19) (17 - 18)  SpO2: 94% (27 Jun 2018 19:35) (94% - 99%)    No Known Allergies      MEDICATIONS  (STANDING):  buDESOnide 160 MICROgram(s)/formoterol 4.5 MICROgram(s) Inhaler 2 Puff(s) Inhalation two times a day  cloNIDine 0.2 milliGRAM(s) Oral two times a day  cyanocobalamin 1000 MICROGram(s) Oral daily  dextrose 5%. 1000 milliLiter(s) (50 mL/Hr) IV Continuous <Continuous>  dextrose 50% Injectable 12.5 Gram(s) IV Push once  dextrose 50% Injectable 25 Gram(s) IV Push once  dextrose 50% Injectable 25 Gram(s) IV Push once  furosemide   Injectable 40 milliGRAM(s) IV Push daily  heparin  Injectable 5000 Unit(s) SubCutaneous every 8 hours  insulin glargine Injectable (LANTUS) 60 Unit(s) SubCutaneous every morning  insulin lispro (HumaLOG) corrective regimen sliding scale   SubCutaneous three times a day before meals  labetalol 500 milliGRAM(s) Oral two times a day  lisinopril 40 milliGRAM(s) Oral daily  sodium chloride 0.9% lock flush 3 milliLiter(s) IV Push every 8 hours  tiotropium 18 MICROgram(s) Capsule 1 Capsule(s) Inhalation daily    MEDICATIONS  (PRN):  acetaminophen   Tablet. 650 milliGRAM(s) Oral every 6 hours PRN mild, moderate , severe pain  dextrose 40% Gel 15 Gram(s) Oral once PRN Blood Glucose LESS THAN 70 milliGRAM(s)/deciliter  glucagon  Injectable 1 milliGRAM(s) IntraMuscular once PRN Glucose LESS THAN 70 milligrams/deciliter                            14.4   10.30 )-----------( 210      ( 27 Jun 2018 05:32 )             43.6     06-27    144  |  105  |  17  ----------------------------<  67<L>  3.4<L>   |  22  |  1.10    Ca    9.6      27 Jun 2018 05:32  Phos  3.2     06-26  Mg     2.2     06-27      CARDIAC MARKERS ( 26 Jun 2018 06:04 )  x     / x     / 145 u/L / 2.62 ng/mL / x            CAPILLARY BLOOD GLUCOSE    POCT Blood Glucose.: 114 mg/dL (27 Jun 2018 22:04)  POCT Blood Glucose.: 101 mg/dL (27 Jun 2018 16:39)  POCT Blood Glucose.: 78 mg/dL (27 Jun 2018 16:21)  POCT Blood Glucose.: 55 mg/dL (27 Jun 2018 15:55)  POCT Blood Glucose.: 105 mg/dL (27 Jun 2018 12:27)  POCT Blood Glucose.: 79 mg/dL (27 Jun 2018 07:29)

## 2018-06-27 NOTE — PROVIDER CONTACT NOTE (OTHER) - BACKGROUND
Adm dx was shortness of breath
PMH of COPD, heart murmur, HTN and DM type II

## 2018-06-27 NOTE — PROVIDER CONTACT NOTE (OTHER) - ASSESSMENT
Pt agitated at this time
Patient with blood glucose of 68 before dinner. Patient asymptomatic at this time.
Patient with blood glucose of 68 before dinner. Patient asymptomatic at this time.
Patient with blood glucose of 68 before dinner. Patient noted to feel "like his blood sugar is low."
Patient with blood glucose of 68 before dinner. Patient noted to feel "like his blood sugar is low."

## 2018-06-27 NOTE — PROVIDER CONTACT NOTE (OTHER) - RECOMMENDATIONS
IV pain medication
Follow hypoglycemia protocol, apple juice to be administered
Follow hypoglycemia protocol, apple juice to be administered
Follow hypoglycemia protocol, apple juice to be administered.
Follow hypoglycemia protocol, apple juice to be administered.

## 2018-06-27 NOTE — PROVIDER CONTACT NOTE (OTHER) - ACTION/TREATMENT ORDERED:
Morphine was administered and ordered as per ADS NP
Follow hypoglycemia protocol, apple juice to be administered
Follow hypoglycemia protocol, apple juice to be administered. repeat in q15m

## 2018-06-27 NOTE — DISCHARGE NOTE ADULT - CARE PROVIDER_API CALL
Rai South), Cardiology; Internal Medicine  [address] [facility]  [address]  [place] [address]  Phone: [phone]  Fax: [phone]    Felicia Perdomo), Internal Medicine  [address] [facility] [address] [place] [address]  Phone: [phone]  Fax: [phone]

## 2018-06-27 NOTE — PROVIDER CONTACT NOTE (OTHER) - SITUATION
Pt stating she is going to through herself on the floor. "I am in so much pain, I am going to through myself on the floor right now."
63 year old male admitted with SOB on 6/25

## 2018-06-27 NOTE — PROGRESS NOTE ADULT - ASSESSMENT
63 year old man with history of Hypertension, DM, COPD, Aotic stenosis  admitted with increased dyspnea and cough for the past 2 weeks.      1. Dyspnea, cough  likely related and secondary to volume overload secondary to acute on chronic diastolic HF in setting of uncontrolled HTN  ? viral process   ct chest reviewed  pulm eval noted, nebs per pulm  cont diuresis as per cards  check echo       2. hypertension  bp uncontrolled  would change Clonidine to TID    3. Abnl EKG  likely sec to lvh  recent stress nl  trend ce's    4. Aortic insuff  mod and stable on recent echo  echo  dvt ppx      5. DM2  fsg riss  hold oral meds  diabetic diet  hga1c 7.1    DVT prophylaxis

## 2018-06-27 NOTE — DISCHARGE NOTE ADULT - HOSPITAL COURSE
64 y/o M with hx of HTN, DM, ?COPD [per chart review, patient unsure], Aortic insufficiency presents with SOB and cough x 1-2 weeks. SOB is intermittent and usually worse when he tries to lift and tries to do certain activities. He does not recall any SOB with exertion or when he lies on bed. He also complains of clear-green sputum cough x 2 weeks. denies fever, chills, falls, LOC, chest pain, abdominal pain, nausea, vomiting, melena, hematochezia, LE edema, dysuria, diarrhea, constipation or calf tenderness.     Patient had recent stress and echo at Dr. South's office    1. Dyspnea, cough  likely related and secondary to volume overload secondary to acute on chronic diastolic HF in setting of uncontrolled HTN  RVP negative   CT revealing trace b/l pleural effusions, emphysema   change lasix to PO    bp control    2. hypertension  bp slightly improved, dbp still remains elevated   increase lisinopril to 40mg daily     3. Abnl EKG  likely sec to lvh  recent stress nl    4. AI  mod and stable on recent echo  recheck echo as outpt      Patient cleared by cardiology for d/c home. f/u appointment scheduled for 7/11 @ 4:15pm

## 2018-06-27 NOTE — DISCHARGE NOTE ADULT - ADDITIONAL INSTRUCTIONS
follow up with Dr. South f/u appointment scheduled for 7/11 @ 4:15pm   follow up with your PCP/Endocrinologist for insulin adjusting within 1 week

## 2018-06-27 NOTE — PROGRESS NOTE ADULT - ASSESSMENT
64 y/o M with hx of HTN, DM, ?COPD [per chart review, patient unsure], Aortic insufficiency presents with SOB and cough x 1-2 weeks. admitted for SOB

## 2018-06-28 VITALS
TEMPERATURE: 98 F | SYSTOLIC BLOOD PRESSURE: 148 MMHG | DIASTOLIC BLOOD PRESSURE: 86 MMHG | RESPIRATION RATE: 18 BRPM | OXYGEN SATURATION: 96 % | HEART RATE: 78 BPM

## 2018-06-28 LAB
BUN SERPL-MCNC: 18 MG/DL — SIGNIFICANT CHANGE UP (ref 7–23)
CALCIUM SERPL-MCNC: 9.7 MG/DL — SIGNIFICANT CHANGE UP (ref 8.4–10.5)
CHLORIDE SERPL-SCNC: 105 MMOL/L — SIGNIFICANT CHANGE UP (ref 98–107)
CO2 SERPL-SCNC: 28 MMOL/L — SIGNIFICANT CHANGE UP (ref 22–31)
CREAT SERPL-MCNC: 1.24 MG/DL — SIGNIFICANT CHANGE UP (ref 0.5–1.3)
GLUCOSE SERPL-MCNC: 87 MG/DL — SIGNIFICANT CHANGE UP (ref 70–99)
HCT VFR BLD CALC: 44.4 % — SIGNIFICANT CHANGE UP (ref 39–50)
HGB BLD-MCNC: 14.8 G/DL — SIGNIFICANT CHANGE UP (ref 13–17)
MAGNESIUM SERPL-MCNC: 2.3 MG/DL — SIGNIFICANT CHANGE UP (ref 1.6–2.6)
MCHC RBC-ENTMCNC: 28.4 PG — SIGNIFICANT CHANGE UP (ref 27–34)
MCHC RBC-ENTMCNC: 33.3 % — SIGNIFICANT CHANGE UP (ref 32–36)
MCV RBC AUTO: 85.1 FL — SIGNIFICANT CHANGE UP (ref 80–100)
NRBC # FLD: 0 — SIGNIFICANT CHANGE UP
PLATELET # BLD AUTO: 206 K/UL — SIGNIFICANT CHANGE UP (ref 150–400)
PMV BLD: 10.8 FL — SIGNIFICANT CHANGE UP (ref 7–13)
POTASSIUM SERPL-MCNC: 4.4 MMOL/L — SIGNIFICANT CHANGE UP (ref 3.5–5.3)
POTASSIUM SERPL-SCNC: 4.4 MMOL/L — SIGNIFICANT CHANGE UP (ref 3.5–5.3)
RBC # BLD: 5.22 M/UL — SIGNIFICANT CHANGE UP (ref 4.2–5.8)
RBC # FLD: 14.1 % — SIGNIFICANT CHANGE UP (ref 10.3–14.5)
SODIUM SERPL-SCNC: 144 MMOL/L — SIGNIFICANT CHANGE UP (ref 135–145)
WBC # BLD: 8.93 K/UL — SIGNIFICANT CHANGE UP (ref 3.8–10.5)
WBC # FLD AUTO: 8.93 K/UL — SIGNIFICANT CHANGE UP (ref 3.8–10.5)

## 2018-06-28 RX ORDER — LABETALOL HCL 100 MG
3 TABLET ORAL
Qty: 0 | Refills: 0 | DISCHARGE
Start: 2018-06-28 | End: 2018-07-27

## 2018-06-28 RX ORDER — AMLODIPINE BESYLATE 2.5 MG/1
1 TABLET ORAL
Qty: 30 | Refills: 0
Start: 2018-06-28 | End: 2018-07-27

## 2018-06-28 RX ORDER — LABETALOL HCL 100 MG
2.5 TABLET ORAL
Qty: 0 | Refills: 0 | COMMUNITY

## 2018-06-28 RX ORDER — LABETALOL HCL 100 MG
5 TABLET ORAL
Qty: 300 | Refills: 0
Start: 2018-06-28 | End: 2018-07-27

## 2018-06-28 RX ADMIN — Medication 0.2 MILLIGRAM(S): at 05:24

## 2018-06-28 RX ADMIN — LISINOPRIL 40 MILLIGRAM(S): 2.5 TABLET ORAL at 05:26

## 2018-06-28 RX ADMIN — INSULIN GLARGINE 60 UNIT(S): 100 INJECTION, SOLUTION SUBCUTANEOUS at 09:09

## 2018-06-28 RX ADMIN — HEPARIN SODIUM 5000 UNIT(S): 5000 INJECTION INTRAVENOUS; SUBCUTANEOUS at 05:24

## 2018-06-28 RX ADMIN — PREGABALIN 1000 MICROGRAM(S): 225 CAPSULE ORAL at 12:59

## 2018-06-28 RX ADMIN — BUDESONIDE AND FORMOTEROL FUMARATE DIHYDRATE 2 PUFF(S): 160; 4.5 AEROSOL RESPIRATORY (INHALATION) at 09:09

## 2018-06-28 RX ADMIN — SODIUM CHLORIDE 3 MILLILITER(S): 9 INJECTION INTRAMUSCULAR; INTRAVENOUS; SUBCUTANEOUS at 05:26

## 2018-06-28 RX ADMIN — Medication 40 MILLIGRAM(S): at 05:24

## 2018-06-28 RX ADMIN — Medication 500 MILLIGRAM(S): at 05:25

## 2018-06-28 RX ADMIN — TIOTROPIUM BROMIDE 1 CAPSULE(S): 18 CAPSULE ORAL; RESPIRATORY (INHALATION) at 09:10

## 2018-06-28 RX ADMIN — AMLODIPINE BESYLATE 10 MILLIGRAM(S): 2.5 TABLET ORAL at 09:10

## 2018-06-28 RX ADMIN — SODIUM CHLORIDE 3 MILLILITER(S): 9 INJECTION INTRAMUSCULAR; INTRAVENOUS; SUBCUTANEOUS at 12:59

## 2018-06-28 NOTE — PROGRESS NOTE ADULT - ATTENDING COMMENTS
Patient seen and examined, agree with the above assessment and plan by SERGIO Ivan.  BP improved  increase lisinopril to 40mg  change lasix to PO  DC home
agree with NP note above  BP remains poorly controlled  add lisinopril 20mg PO daily  continue diuresis
Agree with above NP note.  cv stable  d/c planning   bp better
on cpap for LING

## 2018-06-28 NOTE — PROGRESS NOTE ADULT - PROBLEM SELECTOR PLAN 2
likely secondary to chf: I don't think there is any COPD exacerbation:  6/28: resolved:
likely secondary to chf: I don't think there is any COPD exacerbation:

## 2018-06-28 NOTE — PROGRESS NOTE ADULT - SUBJECTIVE AND OBJECTIVE BOX
CARDIOLOGY FOLLOW UP - Dr. South    CC no cp/sob       PHYSICAL EXAM:  T(C): 36.7 (06-27-18 @ 06:17), Max: 36.7 (06-26-18 @ 13:34)  HR: 70 (06-27-18 @ 11:28) (68 - 101)  BP: 146/103 (06-27-18 @ 08:00) (146/103 - 198/113)  RR: 17 (06-27-18 @ 06:17) (17 - 18)  SpO2: 96% (06-27-18 @ 11:28) (93% - 99%)  Wt(kg): --  I&O's Summary    26 Jun 2018 07:01  -  27 Jun 2018 07:00  --------------------------------------------------------  IN: 648 mL / OUT: 400 mL / NET: 248 mL    27 Jun 2018 07:01  -  27 Jun 2018 12:35  --------------------------------------------------------  IN: 716 mL / OUT: 825 mL / NET: -109 mL        Appearance: Normal	  Cardiovascular: Normal S1 S2,RRR, No JVD,+ SYS MURMUR   Respiratory: Lungs clear to auscultation	  Gastrointestinal:  Soft, Non-tender, + BS	  Extremities: Normal range of motion, No clubbing, cyanosis or edema        MEDICATIONS  (STANDING):  buDESOnide 160 MICROgram(s)/formoterol 4.5 MICROgram(s) Inhaler 2 Puff(s) Inhalation two times a day  cloNIDine 0.2 milliGRAM(s) Oral two times a day  cyanocobalamin 1000 MICROGram(s) Oral daily  dextrose 5%. 1000 milliLiter(s) (50 mL/Hr) IV Continuous <Continuous>  dextrose 50% Injectable 12.5 Gram(s) IV Push once  dextrose 50% Injectable 25 Gram(s) IV Push once  dextrose 50% Injectable 25 Gram(s) IV Push once  furosemide   Injectable 40 milliGRAM(s) IV Push daily  heparin  Injectable 5000 Unit(s) SubCutaneous every 8 hours  insulin glargine Injectable (LANTUS) 60 Unit(s) SubCutaneous every morning  insulin lispro (HumaLOG) corrective regimen sliding scale   SubCutaneous three times a day before meals  labetalol 500 milliGRAM(s) Oral two times a day  lisinopril 20 milliGRAM(s) Oral daily  potassium chloride    Tablet ER 40 milliEquivalent(s) Oral every 4 hours  sodium chloride 0.9% lock flush 3 milliLiter(s) IV Push every 8 hours  tiotropium 18 MICROgram(s) Capsule 1 Capsule(s) Inhalation daily      TELEMETRY: NSR 	    ECG:  	  RADIOLOGY:   DIAGNOSTIC TESTING:  [ ] Echocardiogram:  [ ]  Catheterization:  [ ] Stress Test:    OTHER: 	    LABS:	 	                                14.4   10.30 )-----------( 210      ( 27 Jun 2018 05:32 )             43.6     06-27    144  |  105  |  17  ----------------------------<  67<L>  3.4<L>   |  22  |  1.10    Ca    9.6      27 Jun 2018 05:32  Phos  3.2     06-26  Mg     2.2     06-27
CARDIOLOGY FOLLOW UP - Dr. South    CC no cp/sob  episode of elevated bp yesterday prior to increased dosage       PHYSICAL EXAM:  T(C): 36.8 (06-28-18 @ 09:30), Max: 36.9 (06-28-18 @ 00:43)  HR: 80 (06-28-18 @ 09:30) (70 - 95)  BP: 130/80 (06-28-18 @ 09:30) (130/80 - 193/113)  RR: 18 (06-28-18 @ 09:30) (18 - 19)  SpO2: 97% (06-28-18 @ 09:30) (94% - 98%)  Wt(kg): --  I&O's Summary    27 Jun 2018 07:01  -  28 Jun 2018 07:00  --------------------------------------------------------  IN: 1306 mL / OUT: 1775 mL / NET: -469 mL        Appearance: Normal	  Cardiovascular: Normal S1 S2,RRR, No JVD, +sys murmur   Respiratory: Lungs clear to auscultation	  Gastrointestinal:  Soft, Non-tender, + BS	  Extremities: Normal range of motion, No clubbing, cyanosis or edema        MEDICATIONS  (STANDING):  amLODIPine   Tablet 10 milliGRAM(s) Oral <User Schedule>  buDESOnide 160 MICROgram(s)/formoterol 4.5 MICROgram(s) Inhaler 2 Puff(s) Inhalation two times a day  cloNIDine 0.2 milliGRAM(s) Oral two times a day  cyanocobalamin 1000 MICROGram(s) Oral daily  dextrose 5%. 1000 milliLiter(s) (50 mL/Hr) IV Continuous <Continuous>  dextrose 50% Injectable 12.5 Gram(s) IV Push once  dextrose 50% Injectable 25 Gram(s) IV Push once  dextrose 50% Injectable 25 Gram(s) IV Push once  furosemide   Injectable 40 milliGRAM(s) IV Push daily  heparin  Injectable 5000 Unit(s) SubCutaneous every 8 hours  insulin glargine Injectable (LANTUS) 60 Unit(s) SubCutaneous every morning  insulin lispro (HumaLOG) corrective regimen sliding scale   SubCutaneous three times a day before meals  labetalol 500 milliGRAM(s) Oral two times a day  lisinopril 40 milliGRAM(s) Oral daily  sodium chloride 0.9% lock flush 3 milliLiter(s) IV Push every 8 hours  tiotropium 18 MICROgram(s) Capsule 1 Capsule(s) Inhalation daily      TELEMETRY: NSR 	    ECG:  	  RADIOLOGY:   DIAGNOSTIC TESTING:  [ ] Echocardiogram:  [ ]  Catheterization:  [ ] Stress Test:    OTHER: 	    LABS:	 	                                14.8   8.93  )-----------( 206      ( 28 Jun 2018 06:33 )             44.4     06-28    144  |  105  |  18  ----------------------------<  87  4.4   |  28  |  1.24    Ca    9.7      28 Jun 2018 06:33  Mg     2.3     06-28
CARDIOLOGY FOLLOW UP - Dr. South    CC sob improved, no cp       PHYSICAL EXAM:  T(C): 36.5 (06-26-18 @ 05:34), Max: 37 (06-25-18 @ 20:28)  HR: 77 (06-26-18 @ 05:34) (71 - 95)  BP: 169/87 (06-26-18 @ 05:34) (156/87 - 226/123)  RR: 19 (06-26-18 @ 05:34) (14 - 22)  SpO2: 99% (06-26-18 @ 05:34) (96% - 100%)  Wt(kg): --  I&O's Summary    25 Jun 2018 07:01  -  26 Jun 2018 07:00  --------------------------------------------------------  IN: 0 mL / OUT: 450 mL / NET: -450 mL        Appearance: Normal	  Cardiovascular: Normal S1 S2,RRR, No JVD, sys murmur   Respiratory: Lungs clear to auscultation	  Gastrointestinal:  Soft, Non-tender, + BS	  Extremities: Normal range of motion, No clubbing, bl le trace edema         MEDICATIONS  (STANDING):  buDESOnide 160 MICROgram(s)/formoterol 4.5 MICROgram(s) Inhaler 2 Puff(s) Inhalation two times a day  cloNIDine 0.2 milliGRAM(s) Oral two times a day  cyanocobalamin 1000 MICROGram(s) Oral daily  dextrose 5%. 1000 milliLiter(s) (50 mL/Hr) IV Continuous <Continuous>  dextrose 50% Injectable 12.5 Gram(s) IV Push once  dextrose 50% Injectable 25 Gram(s) IV Push once  dextrose 50% Injectable 25 Gram(s) IV Push once  furosemide   Injectable 40 milliGRAM(s) IV Push daily  heparin  Injectable 5000 Unit(s) SubCutaneous every 8 hours  insulin glargine Injectable (LANTUS) 60 Unit(s) SubCutaneous every morning  insulin lispro (HumaLOG) corrective regimen sliding scale   SubCutaneous three times a day before meals  labetalol 500 milliGRAM(s) Oral two times a day  sodium chloride 0.9% lock flush 3 milliLiter(s) IV Push every 8 hours  tiotropium 18 MICROgram(s) Capsule 1 Capsule(s) Inhalation daily      TELEMETRY: NSR 	    ECG:  	  RADIOLOGY:   DIAGNOSTIC TESTING:  [ ] Echocardiogram:   [ ]  Catheterization:  [ ] Stress Test:    OTHER: 	  < from: CT Chest No Cont (06.25.18 @ 16:57) >  IMPRESSION:     1.  Emphysema.  2.  Trace bilateral pleural effusions.  3.  No pneumonia..    < end of copied text >    LABS:	 	        CKMB: 2.62 ng/mL (06-26 @ 06:04)                          14.1   9.98  )-----------( 199      ( 26 Jun 2018 06:04 )             41.4     06-26    143  |  104  |  15  ----------------------------<  120<H>  3.9   |  26  |  1.21    Ca    9.5      26 Jun 2018 06:04  Phos  3.2     06-26  Mg     2.0     06-26    TPro  7.1  /  Alb  4.0  /  TBili  0.3  /  DBili  x   /  AST  18  /  ALT  14  /  AlkPhos  73  06-25    PT/INR - ( 25 Jun 2018 11:57 )   PT: 11.8 SEC;   INR: 1.06          PTT - ( 25 Jun 2018 11:57 )  PTT:31.5 SEC
CHIEF COMPLAINT:Patient is a 63y old  Male who presents with a chief complaint of SOBand cough (25 Jun 2018 14:49)    	  Interval history:      Allergies:  No Known Allergies      PAST MEDICAL & SURGICAL HISTORY:  Heart murmur: recent dx  Cholelithiasis  COPD (chronic obstructive pulmonary disease)  Diabetes: fs this am 120  Hypertension  S/P exploratory laparotomy: 23 years ago      FAMILY HISTORY:  No pertinent family history in first degree relatives      REVIEW OF SYSTEMS:  CONSTITUTIONAL: No fever, weight loss, or fatigue  EYES: No eye pain, visual disturbances, or discharge  NECK: No pain or stiffness  RESPIRATORY: + cough, no wheezing, + shortness of breath  CARDIOVASCULAR: No chest pain, palpitations, dizziness, or leg swelling  GASTROINTESTINAL: No abdominal or epigastric pain. No nausea, vomiting, diarrhea or constipation  GENITOURINARY: No dysuria, urinary frequency or urgency, no hematuria  NEUROLOGICAL: No headaches, memory loss, loss of strength, numbness, or tremors  SKIN: No itching, burning, rashes, or lesions   MUSCULOSKELETAL: No joint pain or swelling; No muscle, back, or extremity pain    Medications:  MEDICATIONS  (STANDING):  buDESOnide 160 MICROgram(s)/formoterol 4.5 MICROgram(s) Inhaler 2 Puff(s) Inhalation two times a day  cloNIDine 0.2 milliGRAM(s) Oral two times a day  cyanocobalamin 1000 MICROGram(s) Oral daily  dextrose 5%. 1000 milliLiter(s) (50 mL/Hr) IV Continuous <Continuous>  dextrose 50% Injectable 12.5 Gram(s) IV Push once  dextrose 50% Injectable 25 Gram(s) IV Push once  dextrose 50% Injectable 25 Gram(s) IV Push once  furosemide   Injectable 40 milliGRAM(s) IV Push daily  heparin  Injectable 5000 Unit(s) SubCutaneous every 8 hours  insulin glargine Injectable (LANTUS) 60 Unit(s) SubCutaneous every morning  insulin lispro (HumaLOG) corrective regimen sliding scale   SubCutaneous three times a day before meals  labetalol 500 milliGRAM(s) Oral two times a day  sodium chloride 0.9% lock flush 3 milliLiter(s) IV Push every 8 hours  tiotropium 18 MICROgram(s) Capsule 1 Capsule(s) Inhalation daily    MEDICATIONS  (PRN):  acetaminophen   Tablet. 650 milliGRAM(s) Oral every 6 hours PRN mild, moderate , severe pain  dextrose 40% Gel 15 Gram(s) Oral once PRN Blood Glucose LESS THAN 70 milliGRAM(s)/deciliter  glucagon  Injectable 1 milliGRAM(s) IntraMuscular once PRN Glucose LESS THAN 70 milligrams/deciliter    	    PHYSICAL EXAM:  T(C): 36.5 (06-26-18 @ 05:34), Max: 37 (06-25-18 @ 20:28)  HR: 77 (06-26-18 @ 05:34) (71 - 89)  BP: 176/100 (06-26-18 @ 13:02) (156/87 - 226/123)  RR: 19 (06-26-18 @ 05:34) (18 - 22)  SpO2: 99% (06-26-18 @ 05:34) (96% - 100%)  Wt(kg): --  I&O's Summary    25 Jun 2018 07:01  -  26 Jun 2018 07:00  --------------------------------------------------------  IN: 0 mL / OUT: 450 mL / NET: -450 mL        Appearance: Normal	  HEENT:   NCAT, PERRL, EOMI	  Lymphatic: No lymphadenopathy  Cardiovascular: Normal S1 S2, RRR  Respiratory: Lungs clear to auscultation BL  Psychiatry: A & O x 3, Mood & affect appropriate  Gastrointestinal:  Soft, Non-tender, + BS  Skin: No rashes, No ecchymoses, No cyanosis	  Neurologic: Non-focal  Extremities: Normal range of motion, No clubbing, cyanosis or edema    	  LABS:	 	    CARDIAC MARKERS:  CARDIAC MARKERS ( 26 Jun 2018 06:04 )  x     / x     / 145 u/L / 2.62 ng/mL / x                                    14.1   9.98  )-----------( 199      ( 26 Jun 2018 06:04 )             41.4     06-26    143  |  104  |  15  ----------------------------<  120<H>  3.9   |  26  |  1.21    Ca    9.5      26 Jun 2018 06:04  Phos  3.2     06-26  Mg     2.0     06-26    TPro  7.1  /  Alb  4.0  /  TBili  0.3  /  DBili  x   /  AST  18  /  ALT  14  /  AlkPhos  73  06-25    proBNP:   Lipid Profile:   HgA1c: Hemoglobin A1C, Whole Blood: 7.1 % (06-25 @ 19:06)    TSH: Thyroid Stimulating Hormone, Serum: 1.24 uIU/mL (06-26 @ 06:04)  Thyroid Stimulating Hormone, Serum: 1.34 uIU/mL (06-25 @ 19:01)
CHIEF COMPLAINT:Patient is a 63y old  Male who presents with a chief complaint of SOBand cough (25 Jun 2018 14:49)    	  Interval history: BP uncontrolled overnight      Allergies:  No Known Allergies      PAST MEDICAL & SURGICAL HISTORY:  Heart murmur: recent dx  Cholelithiasis  COPD (chronic obstructive pulmonary disease)  Diabetes: fs this am 120  Hypertension  S/P exploratory laparotomy: 23 years ago      FAMILY HISTORY:  No pertinent family history in first degree relatives      REVIEW OF SYSTEMS:  CONSTITUTIONAL: No fever, weight loss, or fatigue  EYES: No eye pain, visual disturbances, or discharge  NECK: No pain or stiffness  RESPIRATORY: + cough, no wheezing, no more shortness of breath  CARDIOVASCULAR: No chest pain, palpitations, dizziness, or leg swelling  GASTROINTESTINAL: No abdominal or epigastric pain. No nausea, vomiting, diarrhea or constipation  GENITOURINARY: No dysuria, urinary frequency or urgency, no hematuria  NEUROLOGICAL: No headaches, memory loss, loss of strength, numbness, or tremors  SKIN: No itching, burning, rashes, or lesions   MUSCULOSKELETAL: No joint pain or swelling; No muscle, back, or extremity pain      Medications:  MEDICATIONS  (STANDING):  buDESOnide 160 MICROgram(s)/formoterol 4.5 MICROgram(s) Inhaler 2 Puff(s) Inhalation two times a day  cloNIDine 0.2 milliGRAM(s) Oral two times a day  cyanocobalamin 1000 MICROGram(s) Oral daily  dextrose 5%. 1000 milliLiter(s) (50 mL/Hr) IV Continuous <Continuous>  dextrose 50% Injectable 12.5 Gram(s) IV Push once  dextrose 50% Injectable 25 Gram(s) IV Push once  dextrose 50% Injectable 25 Gram(s) IV Push once  furosemide   Injectable 40 milliGRAM(s) IV Push daily  heparin  Injectable 5000 Unit(s) SubCutaneous every 8 hours  insulin glargine Injectable (LANTUS) 60 Unit(s) SubCutaneous every morning  insulin lispro (HumaLOG) corrective regimen sliding scale   SubCutaneous three times a day before meals  labetalol 500 milliGRAM(s) Oral two times a day  lisinopril 20 milliGRAM(s) Oral daily  potassium chloride    Tablet ER 40 milliEquivalent(s) Oral every 4 hours  sodium chloride 0.9% lock flush 3 milliLiter(s) IV Push every 8 hours  tiotropium 18 MICROgram(s) Capsule 1 Capsule(s) Inhalation daily    MEDICATIONS  (PRN):  acetaminophen   Tablet. 650 milliGRAM(s) Oral every 6 hours PRN mild, moderate , severe pain  dextrose 40% Gel 15 Gram(s) Oral once PRN Blood Glucose LESS THAN 70 milliGRAM(s)/deciliter  glucagon  Injectable 1 milliGRAM(s) IntraMuscular once PRN Glucose LESS THAN 70 milligrams/deciliter    	    PHYSICAL EXAM:  T(C): 36.7 (06-27-18 @ 06:17), Max: 36.7 (06-26-18 @ 13:34)  HR: 70 (06-27-18 @ 11:28) (68 - 101)  BP: 146/103 (06-27-18 @ 08:00) (146/103 - 198/113)  RR: 17 (06-27-18 @ 06:17) (17 - 18)  SpO2: 96% (06-27-18 @ 11:28) (93% - 99%)  Wt(kg): --  I&O's Summary    26 Jun 2018 07:01  -  27 Jun 2018 07:00  --------------------------------------------------------  IN: 648 mL / OUT: 400 mL / NET: 248 mL    27 Jun 2018 07:01  -  27 Jun 2018 12:25  --------------------------------------------------------  IN: 716 mL / OUT: 825 mL / NET: -109 mL      Appearance: Normal	  HEENT:   NCAT, PERRL, EOMI	  Lymphatic: No lymphadenopathy  Cardiovascular: Normal S1 S2, RRR  Respiratory: Lungs clear to auscultation BL  Psychiatry: A & O x 3, Mood & affect appropriate  Gastrointestinal:  Soft, Non-tender, + BS  Skin: No rashes, No ecchymoses, No cyanosis	  Neurologic: Non-focal  Extremities: Normal range of motion, No clubbing, cyanosis or edema    LABS:	 	    CARDIAC MARKERS:  CARDIAC MARKERS ( 26 Jun 2018 06:04 )  x     / x     / 145 u/L / 2.62 ng/mL / x                                    14.4   10.30 )-----------( 210      ( 27 Jun 2018 05:32 )             43.6     06-27    144  |  105  |  17  ----------------------------<  67<L>  3.4<L>   |  22  |  1.10    Ca    9.6      27 Jun 2018 05:32  Phos  3.2     06-26  Mg     2.2     06-27      proBNP:   Lipid Profile:   HgA1c:   TSH:
CHIEF COMPLAINT:Patient is a 63y old  Male who presents with a chief complaint of SOBand cough (25 Jun 2018 14:49)    	  Interval history: no acute events      Allergies:  No Known Allergies      PAST MEDICAL & SURGICAL HISTORY:  Heart murmur: recent dx  Cholelithiasis  COPD (chronic obstructive pulmonary disease)  Diabetes: fs this am 120  Hypertension  S/P exploratory laparotomy: 23 years ago      FAMILY HISTORY:  No pertinent family history in first degree relatives      REVIEW OF SYSTEMS:  CONSTITUTIONAL: No fever, weight loss, or fatigue  EYES: No eye pain, visual disturbances, or discharge  NECK: No pain or stiffness  RESPIRATORY: no cough, no wheezing, no more shortness of breath  CARDIOVASCULAR: No chest pain, palpitations, dizziness, or leg swelling  GASTROINTESTINAL: No abdominal or epigastric pain. No nausea, vomiting, diarrhea or constipation  GENITOURINARY: No dysuria, urinary frequency or urgency, no hematuria  NEUROLOGICAL: No headaches, memory loss, loss of strength, numbness, or tremors  SKIN: No itching, burning, rashes, or lesions   MUSCULOSKELETAL: No joint pain or swelling; No muscle, back, or extremity pain      Medications:  MEDICATIONS  (STANDING):  amLODIPine   Tablet 10 milliGRAM(s) Oral <User Schedule>  buDESOnide 160 MICROgram(s)/formoterol 4.5 MICROgram(s) Inhaler 2 Puff(s) Inhalation two times a day  cloNIDine 0.2 milliGRAM(s) Oral two times a day  cyanocobalamin 1000 MICROGram(s) Oral daily  dextrose 5%. 1000 milliLiter(s) (50 mL/Hr) IV Continuous <Continuous>  dextrose 50% Injectable 12.5 Gram(s) IV Push once  dextrose 50% Injectable 25 Gram(s) IV Push once  dextrose 50% Injectable 25 Gram(s) IV Push once  furosemide   Injectable 40 milliGRAM(s) IV Push daily  heparin  Injectable 5000 Unit(s) SubCutaneous every 8 hours  insulin glargine Injectable (LANTUS) 60 Unit(s) SubCutaneous every morning  insulin lispro (HumaLOG) corrective regimen sliding scale   SubCutaneous three times a day before meals  labetalol 500 milliGRAM(s) Oral two times a day  lisinopril 40 milliGRAM(s) Oral daily  sodium chloride 0.9% lock flush 3 milliLiter(s) IV Push every 8 hours  tiotropium 18 MICROgram(s) Capsule 1 Capsule(s) Inhalation daily    MEDICATIONS  (PRN):  acetaminophen   Tablet. 650 milliGRAM(s) Oral every 6 hours PRN mild, moderate , severe pain  dextrose 40% Gel 15 Gram(s) Oral once PRN Blood Glucose LESS THAN 70 milliGRAM(s)/deciliter  glucagon  Injectable 1 milliGRAM(s) IntraMuscular once PRN Glucose LESS THAN 70 milligrams/deciliter    	    PHYSICAL EXAM:  T(C): 36.5 (06-28-18 @ 12:24), Max: 36.9 (06-28-18 @ 00:43)  HR: 77 (06-28-18 @ 12:47) (76 - 95)  BP: 159/89 (06-28-18 @ 12:24) (130/80 - 193/113)  RR: 18 (06-28-18 @ 12:24) (18 - 19)  SpO2: 93% (06-28-18 @ 12:47) (93% - 98%)  Wt(kg): --  I&O's Summary    27 Jun 2018 07:01  -  28 Jun 2018 07:00  --------------------------------------------------------  IN: 1306 mL / OUT: 1775 mL / NET: -469 mL    28 Jun 2018 07:01  -  28 Jun 2018 13:43  --------------------------------------------------------  IN: 0 mL / OUT: 600 mL / NET: -600 mL      Appearance: Normal	  HEENT:   NCAT, PERRL, EOMI	  Lymphatic: No lymphadenopathy  Cardiovascular: Normal S1 S2, RRR  Respiratory: Lungs clear to auscultation BL  Psychiatry: A & O x 3, Mood & affect appropriate  Gastrointestinal:  Soft, Non-tender, + BS  Skin: No rashes, No ecchymoses, No cyanosis	  Neurologic: Non-focal  Extremities: Normal range of motion, No clubbing, cyanosis or edema    LABS:	 	    CARDIAC MARKERS:                                14.8   8.93  )-----------( 206      ( 28 Jun 2018 06:33 )             44.4     06-28    144  |  105  |  18  ----------------------------<  87  4.4   |  28  |  1.24    Ca    9.7      28 Jun 2018 06:33  Mg     2.3     06-28      proBNP:   Lipid Profile:   HgA1c:   TSH:
Patient is a 63y old  Male who presents with a chief complaint of SOB and cough (27 Jun 2018 16:27)      Any change in ROS: I feel great :  no SOB     MEDICATIONS  (STANDING):  amLODIPine   Tablet 10 milliGRAM(s) Oral <User Schedule>  buDESOnide 160 MICROgram(s)/formoterol 4.5 MICROgram(s) Inhaler 2 Puff(s) Inhalation two times a day  cloNIDine 0.2 milliGRAM(s) Oral two times a day  cyanocobalamin 1000 MICROGram(s) Oral daily  dextrose 5%. 1000 milliLiter(s) (50 mL/Hr) IV Continuous <Continuous>  dextrose 50% Injectable 12.5 Gram(s) IV Push once  dextrose 50% Injectable 25 Gram(s) IV Push once  dextrose 50% Injectable 25 Gram(s) IV Push once  furosemide   Injectable 40 milliGRAM(s) IV Push daily  heparin  Injectable 5000 Unit(s) SubCutaneous every 8 hours  insulin glargine Injectable (LANTUS) 60 Unit(s) SubCutaneous every morning  insulin lispro (HumaLOG) corrective regimen sliding scale   SubCutaneous three times a day before meals  labetalol 500 milliGRAM(s) Oral two times a day  lisinopril 40 milliGRAM(s) Oral daily  sodium chloride 0.9% lock flush 3 milliLiter(s) IV Push every 8 hours  tiotropium 18 MICROgram(s) Capsule 1 Capsule(s) Inhalation daily    MEDICATIONS  (PRN):  acetaminophen   Tablet. 650 milliGRAM(s) Oral every 6 hours PRN mild, moderate , severe pain  dextrose 40% Gel 15 Gram(s) Oral once PRN Blood Glucose LESS THAN 70 milliGRAM(s)/deciliter  glucagon  Injectable 1 milliGRAM(s) IntraMuscular once PRN Glucose LESS THAN 70 milligrams/deciliter    Vital Signs Last 24 Hrs  T(C): 36.5 (28 Jun 2018 12:24), Max: 36.9 (28 Jun 2018 00:43)  T(F): 97.7 (28 Jun 2018 12:24), Max: 98.4 (28 Jun 2018 00:43)  HR: 77 (28 Jun 2018 12:47) (76 - 95)  BP: 159/89 (28 Jun 2018 12:24) (130/80 - 193/113)  BP(mean): --  RR: 18 (28 Jun 2018 12:24) (18 - 19)  SpO2: 93% (28 Jun 2018 12:47) (93% - 98%)    I&O's Summary    27 Jun 2018 07:01  -  28 Jun 2018 07:00  --------------------------------------------------------  IN: 1306 mL / OUT: 1775 mL / NET: -469 mL    28 Jun 2018 07:01  -  28 Jun 2018 13:04  --------------------------------------------------------  IN: 0 mL / OUT: 600 mL / NET: -600 mL          Physical Exam:   GENERAL: NAD, well-groomed, well-developed  HEENT: PABLITO/   Atraumatic, Normocephalic  ENMT: No tonsillar erythema, exudates, or enlargement; Moist mucous membranes, Good dentition, No lesions  NECK: Supple, No JVD, Normal thyroid  CHEST/LUNG: Clear to auscultaion, ; No rales, rhonchi, wheezing, or rubs  CVS: Regular rate and rhythm; No murmurs, rubs, or gallops  GI: : Soft, Nontender, Nondistended; Bowel sounds present  NERVOUS SYSTEM:  Alert & Oriented X3  EXTREMITIES:  2+ Peripheral Pulses, No clubbing, cyanosis, or edema  LYMPH: No lymphadenopathy noted  SKIN: No rashes or lesions  ENDOCRINOLOGY: No Thyromegaly  PSYCH: Appropriate    Labs:                              14.8   8.93  )-----------( 206      ( 28 Jun 2018 06:33 )             44.4                         14.4   10.30 )-----------( 210      ( 27 Jun 2018 05:32 )             43.6                         14.1   9.98  )-----------( 199      ( 26 Jun 2018 06:04 )             41.4                         13.6   9.24  )-----------( 193      ( 25 Jun 2018 11:57 )             42.3     06-28    144  |  105  |  18  ----------------------------<  87  4.4   |  28  |  1.24  06-27    144  |  105  |  17  ----------------------------<  67<L>  3.4<L>   |  22  |  1.10  06-26    143  |  104  |  15  ----------------------------<  120<H>  3.9   |  26  |  1.21  06-25    144  |  106  |  15  ----------------------------<  117<H>  4.5   |  25  |  1.25    Ca    9.7      28 Jun 2018 06:33  Ca    9.6      27 Jun 2018 05:32  Mg     2.3     06-28  Mg     2.2     06-27    TPro  7.1  /  Alb  4.0  /  TBili  0.3  /  DBili  x   /  AST  18  /  ALT  14  /  AlkPhos  73  06-25    CAPILLARY BLOOD GLUCOSE      POCT Blood Glucose.: 93 mg/dL (28 Jun 2018 12:22)  POCT Blood Glucose.: 92 mg/dL (28 Jun 2018 07:49)  POCT Blood Glucose.: 114 mg/dL (27 Jun 2018 22:04)  POCT Blood Glucose.: 101 mg/dL (27 Jun 2018 16:39)  POCT Blood Glucose.: 78 mg/dL (27 Jun 2018 16:21)  POCT Blood Glucose.: 55 mg/dL (27 Jun 2018 15:55)                  RECENT CULTURES:        RESPIRATORY CULTURES:          Studies  Chest X-RAY  CT SCAN Chest   Venous Dopplers: LE:   CT Abdomen  Others      < from: CT Chest No Cont (06.25.18 @ 16:57) >  Mediastinum: No enlarged chest lymph nodes. There are 2 left lobe thyroid   nodules measuring 2.4 and 2.3 cm, unchanged.       Heart and Vasculature: The left atrium is likely enlarged. The remainder   of the heart is likely normal in size. There is no pericardial effusion.   Coronary artery disease with calcified plaque involving the RCA, LAD, and   circumflex coronary arteries.      The mainpulmonary artery is enlarged which can occur in the setting of   pulmonary arterial hypertension Left-sided aortic arch and left-sided   descending thoracic aorta. The ascending is aneurysmal and measures 4.1   cm at the level of the main pulmonary artery. The descending thoracic   aorta is aneurysmal and measures 3.5 cm at the level of the main   pulmonary artery. Atheromatous disease of the aorta and major branches is   present.    Upper Abdomen: Status post cholecystectomy. A 1.5 x 1.4 cm right adrenal   adenoma is unchanged. The left adrenal gland body and lateral limb are   thickened. Within the body of the left adrenal gland is a 9 x 9 mm   adenoma.    Bones And Soft Tissues: The bones are unremarkable.  The soft tissues are   unremarkable.      IMPRESSION:     1.  Emphysema.  2.  Trace bilateral pleural effusions.  3.  No pneumonia..              DANILO PINK M.D., RADIOLOGY RESIDENT    < end of copied text >
Patient is a 63y old  Male who presents with a chief complaint of SOBand cough (25 Jun 2018 14:49)      Any change in ROS: dopig ok     MEDICATIONS  (STANDING):  buDESOnide 160 MICROgram(s)/formoterol 4.5 MICROgram(s) Inhaler 2 Puff(s) Inhalation two times a day  cloNIDine 0.2 milliGRAM(s) Oral two times a day  cyanocobalamin 1000 MICROGram(s) Oral daily  dextrose 5%. 1000 milliLiter(s) (50 mL/Hr) IV Continuous <Continuous>  dextrose 50% Injectable 12.5 Gram(s) IV Push once  dextrose 50% Injectable 25 Gram(s) IV Push once  dextrose 50% Injectable 25 Gram(s) IV Push once  furosemide   Injectable 40 milliGRAM(s) IV Push daily  heparin  Injectable 5000 Unit(s) SubCutaneous every 8 hours  insulin glargine Injectable (LANTUS) 60 Unit(s) SubCutaneous every morning  insulin lispro (HumaLOG) corrective regimen sliding scale   SubCutaneous three times a day before meals  labetalol 500 milliGRAM(s) Oral two times a day  lisinopril 20 milliGRAM(s) Oral daily  sodium chloride 0.9% lock flush 3 milliLiter(s) IV Push every 8 hours  tiotropium 18 MICROgram(s) Capsule 1 Capsule(s) Inhalation daily    MEDICATIONS  (PRN):  acetaminophen   Tablet. 650 milliGRAM(s) Oral every 6 hours PRN mild, moderate , severe pain  dextrose 40% Gel 15 Gram(s) Oral once PRN Blood Glucose LESS THAN 70 milliGRAM(s)/deciliter  glucagon  Injectable 1 milliGRAM(s) IntraMuscular once PRN Glucose LESS THAN 70 milligrams/deciliter    Vital Signs Last 24 Hrs  T(C): 36.5 (27 Jun 2018 13:19), Max: 36.7 (26 Jun 2018 23:11)  T(F): 97.7 (27 Jun 2018 13:19), Max: 98.1 (26 Jun 2018 23:11)  HR: 79 (27 Jun 2018 13:19) (68 - 101)  BP: 179/97 (27 Jun 2018 13:19) (146/103 - 198/113)  BP(mean): --  RR: 18 (27 Jun 2018 13:19) (17 - 18)  SpO2: 97% (27 Jun 2018 13:19) (93% - 99%)    I&O's Summary    26 Jun 2018 07:01  -  27 Jun 2018 07:00  --------------------------------------------------------  IN: 648 mL / OUT: 400 mL / NET: 248 mL    27 Jun 2018 07:01  -  27 Jun 2018 14:18  --------------------------------------------------------  IN: 716 mL / OUT: 825 mL / NET: -109 mL          Physical Exam:   GENERAL: NAD, well-groomed, well-developed  HEENT: PABLITO/   Atraumatic, Normocephalic  ENMT: No tonsillar erythema, exudates, or enlargement; Moist mucous membranes, Good dentition, No lesions  NECK: Supple, No JVD, Normal thyroid  CHEST/LUNG: Clear to auscultaion, ; No rales, rhonchi, wheezing, or rubs  CVS: Regular rate and rhythm; No murmurs, rubs, or gallops  GI: : Soft, Nontender, Nondistended; Bowel sounds present  NERVOUS SYSTEM:  Alert & Oriented X3  EXTREMITIES:  2+ Peripheral Pulses, No clubbing, cyanosis, or edema  LYMPH: No lymphadenopathy noted  SKIN: No rashes or lesions  ENDOCRINOLOGY: No Thyromegaly  PSYCH: Appropriate    Labs:    CARDIAC MARKERS ( 26 Jun 2018 06:04 )  x     / x     / 145 u/L / 2.62 ng/mL / x                                14.4   10.30 )-----------( 210      ( 27 Jun 2018 05:32 )             43.6                         14.1   9.98  )-----------( 199      ( 26 Jun 2018 06:04 )             41.4                         13.6   9.24  )-----------( 193      ( 25 Jun 2018 11:57 )             42.3     06-27    144  |  105  |  17  ----------------------------<  67<L>  3.4<L>   |  22  |  1.10  06-26    143  |  104  |  15  ----------------------------<  120<H>  3.9   |  26  |  1.21  06-25    144  |  106  |  15  ----------------------------<  117<H>  4.5   |  25  |  1.25    Ca    9.6      27 Jun 2018 05:32  Ca    9.5      26 Jun 2018 06:04  Phos  3.2     06-26  Mg     2.2     06-27  Mg     2.0     06-26    TPro  7.1  /  Alb  4.0  /  TBili  0.3  /  DBili  x   /  AST  18  /  ALT  14  /  AlkPhos  73  06-25    CAPILLARY BLOOD GLUCOSE      POCT Blood Glucose.: 105 mg/dL (27 Jun 2018 12:27)  POCT Blood Glucose.: 79 mg/dL (27 Jun 2018 07:29)  POCT Blood Glucose.: 114 mg/dL (26 Jun 2018 21:16)  POCT Blood Glucose.: 133 mg/dL (26 Jun 2018 18:42)  POCT Blood Glucose.: 98 mg/dL (26 Jun 2018 18:10)  POCT Blood Glucose.: 68 mg/dL (26 Jun 2018 17:39)                  RECENT CULTURES:        RESPIRATORY CULTURES:          Studies  Chest X-RAY  CT SCAN Chest   Venous Dopplers: LE:   CT Abdomen  Others        < from: CT Chest No Cont (06.25.18 @ 16:57) >  cm at the level of the main pulmonary artery. The descending thoracic   aorta is aneurysmal and measures 3.5 cm at the level of the main   pulmonary artery. Atheromatous disease of the aorta and major branches is   present.    Upper Abdomen: Status post cholecystectomy. A 1.5 x 1.4 cm right adrenal   adenoma is unchanged. The left adrenal gland body and lateral limb are   thickened. Within the body of the left adrenal gland is a 9 x 9 mm   adenoma.    Bones And Soft Tissues: The bones are unremarkable.  The soft tissues are   unremarkable.      IMPRESSION:     1.  Emphysema.  2.  Trace bilateral pleural effusions.  3.  No pneumonia..              DANILO PINK M.D., RADIOLOGY RESIDENT  This document has been electronically signed.  CHUCKY RODRIGEZ M.D., ATTENDING RADIOLOGIST  This document has been electronically signed. Jun 26 2018 11:39AM    < end of copied text >

## 2018-06-28 NOTE — PROGRESS NOTE ADULT - PROVIDER SPECIALTY LIST ADULT
Cardiology
Internal Medicine
Pulmonology
Pulmonology

## 2018-06-28 NOTE — PROGRESS NOTE ADULT - PROBLEM SELECTOR PLAN 1
ct scan noted: there is no pneumonia: no wheezing: cont BD  6/28: outpt PFT
ct scan noted: there is no pneumonia: no wheezing: cont BD

## 2018-06-28 NOTE — PROGRESS NOTE ADULT - PROBLEM SELECTOR PLAN 3
still high: cont to optimize his anti hypertensives:
still high: cont to optimize his anti hypertensives:  6/28: cont anti hypertensives:

## 2018-06-28 NOTE — PROGRESS NOTE ADULT - ASSESSMENT
recent stress echo normal   recent echo 4/2018 EF 65%, mod AI, mod lvh, costello dysfxn  CT chest (6/25/18) trace b/l pleural effusions, emphysema     A/P    63 year old man with history of Hypertension, DM, COPD, AI admitted with increased dyspnea and cough for the past 2 weeks.      1. Dyspnea, cough  likely related and secondary to volume overload secondary to acute on chronic diastolic HF in setting of uncontrolled HTN  RVP negative   CT revealing trace b/l pleural effusions, emphysema   continue PO lasix   echo can be done as outpatient   bp control    2. hypertension  bp improved   increase lisinopril to 40mg daily     3. Abnl EKG  likely sec to lvh  recent stress nl  trend ce's    4. AI  mod and stable on recent echo  echo can be done as outpatient       dvt ppx  d/c home, f/u appointment scheduled for 7/11 @ 4:15pm recent stress echo normal   recent echo 4/2018 EF 65%, mod AI, mod lvh, costello dysfxn  CT chest (6/25/18) trace b/l pleural effusions, emphysema     A/P    63 year old man with history of Hypertension, DM, COPD, AI admitted with increased dyspnea and cough for the past 2 weeks.      1. Dyspnea, cough  likely related and secondary to volume overload secondary to acute on chronic diastolic HF in setting of uncontrolled HTN  RVP negative   CT revealing trace b/l pleural effusions, emphysema   continue PO lasix   echo can be done as outpatient   bp control    2. hypertension  bp improved   continue current medication regimen     3. Abnl EKG  likely sec to lvh  recent stress nl  trend ce's    4. AI  mod and stable on recent echo  echo can be done as outpatient       dvt ppx  d/c home, f/u appointment scheduled for 7/11 @ 4:15pm

## 2018-06-28 NOTE — DIETITIAN INITIAL EVALUATION ADULT. - OTHER INFO
Pt states that his appetite has been good and he has been eating well. Pt states that he knows and follows a consistent carb, DASH diet at home. Reviewed diet with Pt who verbalized good understanding. Pt had no complaints of GI distress nor of difficulty chewing or swallowing.

## 2018-06-28 NOTE — PROGRESS NOTE ADULT - ASSESSMENT
63 year old man with history of Hypertension, DM, COPD, Aotic stenosis  admitted with increased dyspnea and cough for the past 2 weeks.      1. Dyspnea, cough  likely related and secondary to volume overload secondary to acute on chronic diastolic HF in setting of uncontrolled HTN  ? viral process   ct chest reviewed  pulm eval noted, nebs per pulm  cont diuresis as per cards  check echo       2. hypertension  BP better controlled, meds per Cardio    3. Abnl EKG  likely sec to lvh  recent stress nl  trend ce's    4. Aortic insuff  mod and stable on recent echo  echo  dvt ppx      5. DM2  fsg riss  hold oral meds  diabetic diet  hga1c 7.1    DVT prophylaxis    d/c planning per Cardio

## 2018-10-09 ENCOUNTER — APPOINTMENT (OUTPATIENT)
Dept: OPHTHALMOLOGY | Facility: CLINIC | Age: 63
End: 2018-10-09

## 2019-01-29 ENCOUNTER — TRANSCRIPTION ENCOUNTER (OUTPATIENT)
Age: 64
End: 2019-01-29

## 2020-01-08 NOTE — PHYSICAL THERAPY INITIAL EVALUATION ADULT - FOLLOWS COMMANDS/ANSWERS QUESTIONS, REHAB EVAL
Patient comes to clinic for follow up anticoagulation visit.      Dx: CAF  Goal: 2.0-3.0      Last INR 1.5 was 11/6. Dose increased per protocol.   Today's INR is 1.6 and is below goal range.    Current warfarin dosing verified with patient. Patient was informed that today's INR result is below therapeutic range and instructed to increase current dose per protocol. Discussed return date for next INR.     Dr. Godoy is in the office today supervising the treatment. Note forwarded to physician for review.    Call your physician immediately if you notice any of the following symptoms of a blood clot:   Sudden weakness in any limb  Numbness or tingling anywhere  Visual changes or loss of sight in either eye  Sudden onset of slurred speech or inability to speak  Dizziness or faintness  New pain, swelling, redness or heat in any extremity  New SOB or chest pain  Symptoms associated with blood clotting/low INR reviewed and verbalizes understanding.    Patient was instructed to contact the clinic with any unusual bleeding or bruising, any changes in medications, diet, health status, lifestyle, or any other changes, questions or concerns. Patient verbalized understanding of all discussed.      100% of the time/able to follow multistep instructions

## 2020-04-01 ENCOUNTER — INPATIENT (INPATIENT)
Facility: HOSPITAL | Age: 65
LOS: 10 days | Discharge: HOME | End: 2020-04-12
Attending: INTERNAL MEDICINE | Admitting: INTERNAL MEDICINE
Payer: COMMERCIAL

## 2020-04-01 VITALS
RESPIRATION RATE: 25 BRPM | TEMPERATURE: 98 F | OXYGEN SATURATION: 93 % | HEART RATE: 105 BPM | SYSTOLIC BLOOD PRESSURE: 154 MMHG | DIASTOLIC BLOOD PRESSURE: 83 MMHG

## 2020-04-01 DIAGNOSIS — Z98.89 OTHER SPECIFIED POSTPROCEDURAL STATES: Chronic | ICD-10-CM

## 2020-04-01 DIAGNOSIS — R68.89 OTHER GENERAL SYMPTOMS AND SIGNS: ICD-10-CM

## 2020-04-01 LAB
ALBUMIN SERPL ELPH-MCNC: 2.5 G/DL — LOW (ref 3.3–5)
ALP SERPL-CCNC: 96 U/L — SIGNIFICANT CHANGE UP (ref 40–120)
ALT FLD-CCNC: 32 U/L — SIGNIFICANT CHANGE UP (ref 4–41)
ANION GAP SERPL CALC-SCNC: 18 MMO/L — HIGH (ref 7–14)
AST SERPL-CCNC: 47 U/L — HIGH (ref 4–40)
B-OH-BUTYR SERPL-SCNC: 0.6 MMOL/L — HIGH (ref 0–0.4)
BASE EXCESS BLDV CALC-SCNC: 1.6 MMOL/L — SIGNIFICANT CHANGE UP
BASOPHILS # BLD AUTO: 0.03 K/UL — SIGNIFICANT CHANGE UP (ref 0–0.2)
BASOPHILS NFR BLD AUTO: 0.2 % — SIGNIFICANT CHANGE UP (ref 0–2)
BILIRUB SERPL-MCNC: 0.3 MG/DL — SIGNIFICANT CHANGE UP (ref 0.2–1.2)
BLOOD GAS VENOUS - CREATININE: SIGNIFICANT CHANGE UP MG/DL (ref 0.5–1.3)
BLOOD GAS VENOUS - FIO2: 100 — SIGNIFICANT CHANGE UP
BUN SERPL-MCNC: 89 MG/DL — HIGH (ref 7–23)
CALCIUM SERPL-MCNC: 8.9 MG/DL — SIGNIFICANT CHANGE UP (ref 8.4–10.5)
CHLORIDE BLDV-SCNC: 104 MMOL/L — SIGNIFICANT CHANGE UP (ref 96–108)
CHLORIDE SERPL-SCNC: 96 MMOL/L — LOW (ref 98–107)
CO2 SERPL-SCNC: 21 MMOL/L — LOW (ref 22–31)
CREAT SERPL-MCNC: 4.88 MG/DL — HIGH (ref 0.5–1.3)
CRP SERPL-MCNC: 126.6 MG/L — HIGH
EOSINOPHIL # BLD AUTO: 0.01 K/UL — SIGNIFICANT CHANGE UP (ref 0–0.5)
EOSINOPHIL NFR BLD AUTO: 0.1 % — SIGNIFICANT CHANGE UP (ref 0–6)
FERRITIN SERPL-MCNC: 867.9 NG/ML — HIGH (ref 30–400)
GAS PNL BLDV: 138 MMOL/L — SIGNIFICANT CHANGE UP (ref 136–146)
GLUCOSE BLDV-MCNC: 385 MG/DL — HIGH (ref 70–99)
GLUCOSE SERPL-MCNC: 375 MG/DL — HIGH (ref 70–99)
HCO3 BLDV-SCNC: 23 MMOL/L — SIGNIFICANT CHANGE UP (ref 20–27)
HCT VFR BLD CALC: 45.5 % — SIGNIFICANT CHANGE UP (ref 39–50)
HCT VFR BLDV CALC: 46.3 % — SIGNIFICANT CHANGE UP (ref 39–51)
HGB BLD-MCNC: 14.6 G/DL — SIGNIFICANT CHANGE UP (ref 13–17)
HGB BLDV-MCNC: 15.1 G/DL — SIGNIFICANT CHANGE UP (ref 13–17)
IMM GRANULOCYTES NFR BLD AUTO: 1.6 % — HIGH (ref 0–1.5)
LACTATE BLDV-MCNC: 1.9 MMOL/L — SIGNIFICANT CHANGE UP (ref 0.5–2)
LDH SERPL L TO P-CCNC: 679 U/L — HIGH (ref 135–225)
LYMPHOCYTES # BLD AUTO: 0.89 K/UL — LOW (ref 1–3.3)
LYMPHOCYTES # BLD AUTO: 7.3 % — LOW (ref 13–44)
MAGNESIUM SERPL-MCNC: 2.6 MG/DL — SIGNIFICANT CHANGE UP (ref 1.6–2.6)
MCHC RBC-ENTMCNC: 28.1 PG — SIGNIFICANT CHANGE UP (ref 27–34)
MCHC RBC-ENTMCNC: 32.1 % — SIGNIFICANT CHANGE UP (ref 32–36)
MCV RBC AUTO: 87.5 FL — SIGNIFICANT CHANGE UP (ref 80–100)
MONOCYTES # BLD AUTO: 0.72 K/UL — SIGNIFICANT CHANGE UP (ref 0–0.9)
MONOCYTES NFR BLD AUTO: 5.9 % — SIGNIFICANT CHANGE UP (ref 2–14)
NEUTROPHILS # BLD AUTO: 10.36 K/UL — HIGH (ref 1.8–7.4)
NEUTROPHILS NFR BLD AUTO: 84.9 % — HIGH (ref 43–77)
NRBC # FLD: 0.03 K/UL — SIGNIFICANT CHANGE UP (ref 0–0)
PCO2 BLDV: 59 MMHG — HIGH (ref 41–51)
PH BLDV: 7.29 PH — LOW (ref 7.32–7.43)
PHOSPHATE SERPL-MCNC: 4.9 MG/DL — HIGH (ref 2.5–4.5)
PLATELET # BLD AUTO: 323 K/UL — SIGNIFICANT CHANGE UP (ref 150–400)
PMV BLD: 10.2 FL — SIGNIFICANT CHANGE UP (ref 7–13)
PO2 BLDV: 26 MMHG — LOW (ref 35–40)
POTASSIUM BLDV-SCNC: 4.3 MMOL/L — SIGNIFICANT CHANGE UP (ref 3.4–4.5)
POTASSIUM SERPL-MCNC: 4.5 MMOL/L — SIGNIFICANT CHANGE UP (ref 3.5–5.3)
POTASSIUM SERPL-SCNC: 4.5 MMOL/L — SIGNIFICANT CHANGE UP (ref 3.5–5.3)
PROT SERPL-MCNC: 7.2 G/DL — SIGNIFICANT CHANGE UP (ref 6–8.3)
RBC # BLD: 5.2 M/UL — SIGNIFICANT CHANGE UP (ref 4.2–5.8)
RBC # FLD: 13.8 % — SIGNIFICANT CHANGE UP (ref 10.3–14.5)
SAO2 % BLDV: 38.1 % — LOW (ref 60–85)
SODIUM SERPL-SCNC: 135 MMOL/L — SIGNIFICANT CHANGE UP (ref 135–145)
TROPONIN T, HIGH SENSITIVITY: 56 NG/L — CRITICAL HIGH (ref ?–14)
WBC # BLD: 12.2 K/UL — HIGH (ref 3.8–10.5)
WBC # FLD AUTO: 12.2 K/UL — HIGH (ref 3.8–10.5)

## 2020-04-01 PROCEDURE — 99221 1ST HOSP IP/OBS SF/LOW 40: CPT | Mod: AI

## 2020-04-01 PROCEDURE — 71045 X-RAY EXAM CHEST 1 VIEW: CPT | Mod: 26

## 2020-04-01 RX ORDER — HEPARIN SODIUM 5000 [USP'U]/ML
5000 INJECTION INTRAVENOUS; SUBCUTANEOUS ONCE
Refills: 0 | Status: DISCONTINUED | OUTPATIENT
Start: 2020-04-01 | End: 2020-04-02

## 2020-04-01 RX ORDER — INSULIN HUMAN 100 [IU]/ML
10 INJECTION, SOLUTION SUBCUTANEOUS ONCE
Refills: 0 | Status: COMPLETED | OUTPATIENT
Start: 2020-04-01 | End: 2020-04-01

## 2020-04-01 RX ORDER — INSULIN HUMAN 100 [IU]/ML
10 INJECTION, SOLUTION SUBCUTANEOUS ONCE
Refills: 0 | Status: DISCONTINUED | OUTPATIENT
Start: 2020-04-01 | End: 2020-04-01

## 2020-04-01 RX ORDER — LABETALOL HCL 100 MG
10 TABLET ORAL ONCE
Refills: 0 | Status: COMPLETED | OUTPATIENT
Start: 2020-04-01 | End: 2020-04-01

## 2020-04-01 RX ADMIN — Medication 10 MILLIGRAM(S): at 20:51

## 2020-04-01 RX ADMIN — Medication 125 MILLIGRAM(S): at 22:55

## 2020-04-01 RX ADMIN — INSULIN HUMAN 10 UNIT(S): 100 INJECTION, SOLUTION SUBCUTANEOUS at 22:55

## 2020-04-01 NOTE — ED ADULT NURSE NOTE - OBJECTIVE STATEMENT
Patient received from day RN, A&Ox3, patient in prone position o2 saturation 100% on non-rebreather. Patient with a 20 G placed in R AC. VSS. Will continue to monitor. Patient received from day RN, A&Ox3, patient received from day RN in prone position o2 saturation 100% on non-rebreather. Patient with Phx DMII on insulin, HLD, COPD with SOB X1 day and contact with Covid + co-worker. Denies fevers, chills, N/V/diarrhea. Patient o2 improved after proning as per day RN. 74% to 95% on non-rebreather.  Patient with a 20 G placed in R AC. VSS. Will continue to monitor.

## 2020-04-01 NOTE — ED PROVIDER NOTE - NS ED ROS FT
REVIEW OF SYSTEMS:    CONSTITUTIONAL: No weakness, fevers or chills  EYES/ENT: No visual changes;  No vertigo or throat pain   NECK: No pain or stiffness  RESPIRATORY: No cough, wheezing, hemoptysis; +ve shortness of breath  CARDIOVASCULAR: No chest pain or palpitations  GASTROINTESTINAL: No abdominal or epigastric pain. No nausea, vomiting, or hematemesis; No diarrhea or constipation. No melena or hematochezia.  GENITOURINARY: No dysuria, frequency or hematuria  NEUROLOGICAL: No numbness or weakness  SKIN: No itching, burning, rashes, or lesions   All other review of systems is negative unless indicated above.

## 2020-04-01 NOTE — ED PROVIDER NOTE - CLINICAL SUMMARY MEDICAL DECISION MAKING FREE TEXT BOX
65M with HTN, T2DM on insulin, HLD, ?copd presents to ED for evaluation of shortness of breath likely acute hypoxic respiratory failure secondary to COVID 19. Will need admission given requirement of oxygen supplementation. Obtain CXR, CBC, CMP, B-oh, UA, VBG, COVID 19, EKG. Give IV labetalol 10 mg x 1 for hypertensive urgency.

## 2020-04-01 NOTE — ED ADULT TRIAGE NOTE - CHIEF COMPLAINT QUOTE
PT C/O SOB x 1 day. States coworker diagnosed with COVID 19. SpO2 67 % on room air: Placed on non-rebreather and SpO@ at 93 %. Denies fevers, headache, myalgias, N/V/D. PHX: DM PT C/O SOB x 1 day. States coworker diagnosed with COVID 19. SpO2 67 % on room air: Placed on non-rebreather and SpO@ at 93 %. Denies fevers, headache, myalgias, N/V/D. PHX: DM; BGL in triage =395.

## 2020-04-01 NOTE — ED PROVIDER NOTE - ATTENDING CONTRIBUTION TO CARE
65 year old with hypoxia presumed to be from Coronavirus infection.  will send covdi labs, start treatment place on NRB, self prone, admit

## 2020-04-01 NOTE — ED ADULT NURSE NOTE - CHIEF COMPLAINT QUOTE
PT C/O SOB x 1 day. States coworker diagnosed with COVID 19. SpO2 67 % on room air: Placed on non-rebreather and SpO@ at 93 %. Denies fevers, headache, myalgias, N/V/D. PHX: DM; BGL in triage =395.

## 2020-04-01 NOTE — ED ADULT TRIAGE NOTE - ARRIVAL FROM
Subjective:   Jamarcus Winters is a 55 y.o. female here today for   Chief Complaint   Patient presents with   • Follow-Up     BP   • Other     MASS ON THYROID IS GROWING        1. Health maintenance examination  Patient is here today for an annual.  She has an updated colon cancer screening and mammogram.  She is due for Shingrix.  She did get a flu shot this year.  Health Maintenance Summary                IMM ZOSTER VACCINES Overdue 11/25/2013     MAMMOGRAM Next Due 5/23/2020      Done 5/23/2018 MA-MAMMO SCREENING BILAT W/TOMOSYNTHESIS W/CAD     Patient has more history with this topic...    PAP SMEAR Next Due 7/31/2021      Done 7/31/2018 PATHOLOGY GYN SPECIMEN     Patient has more history with this topic...    COLOGUARD STOOL DNA Next Due 8/23/2021      Done 8/23/2018 COLOGUARD COLON CANCER SCREENING    IMM DTaP/Tdap/Td Vaccine Next Due 10/1/2022      Done 10/1/2012 Imm Admin: Tdap Vaccine          2. Thyroid nodule  This is chronic.  Patient is following up with ENT.  She did have a biopsy of the nodule in 2017.  At that time she was recommended to not have surgical excision.  She now feels as though she is having compressive symptoms with change in voice, sore throat, difficulty lying on one side of the body because of the nodule.    3. Cervical radiculopathy  4. Lumbar radiculopathy  These are new problems to discuss with me today.  Patient has been having back pain and neck pain.  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  She notices arm numbness and tingling especially when sleeping.  She is having pain radiating down bilateral lower extremities into the feet.  She had a lumbar x-ray 6/22/2018 with arthritis.    5. Baker's cyst of knee, right  New to discuss with me.  Patient has recurrent swelling behind the knee.  She has had meniscus surgery in the past.  It is limiting the exercise she can do    6. Vitamin D deficiency  Chronic.  Taking vitamin D supplements  "intermittently    7. Mixed hyperlipidemia  Chronic.  Not on medication.  Last labs done 11 months ago.      Current medicines (including changes today)  Current Outpatient Prescriptions   Medication Sig Dispense Refill   • albuterol 108 (90 Base) MCG/ACT Aero Soln inhalation aerosol Inhale 2 Puffs by mouth every 6 hours as needed for Shortness of Breath.     • triamcinolone (NASACORT ALLERGY 24HR) 55 MCG/ACT nasal inhaler Spray 2 Sprays in nose every day.     • ibuprofen (MOTRIN) 400 MG Tab Take 400 mg by mouth every 6 hours as needed.       No current facility-administered medications for this visit.      She  has a past medical history of Elevated blood pressure reading (3/29/2018); Family history of alcoholism (9/23/2011); Foot pain (9/23/2011); History of depression (9/23/2011); Neutrophilia (3/29/2018); and Pain.    ROS   No fevers  No bowel changes  No LE edema       Objective:     Blood pressure 122/72, pulse 100, temperature 37.3 °C (99.2 °F), temperature source Temporal, resp. rate 14, height 1.702 m (5' 7\"), weight 93.1 kg (205 lb 3.2 oz), SpO2 96 %, not currently breastfeeding. Body mass index is 32.14 kg/m².   Physical Exam:  Constitutional: Alert, no distress.  Skin: Warm, dry, good turgor, no rashes in visible areas.  Eye: Equal, round and reactive, conjunctiva clear, lids normal.  ENMT: Lips without lesions, good dentition, oropharynx clear.  Neck: Trachea midline, no masses, positive thyromegaly. No cervical or supraclavicular lymphadenopathy  Respiratory: Unlabored respiratory effort, lungs clear to auscultation, no wheezes, no ronchi.  Cardiovascular: Normal S1, S2, RRR, no murmur, no edema.  Abdomen: Soft, non-tender, no masses, no hepatosplenomegaly.  Psych: Alert and oriented x3, normal affect and mood.  MSK: No spinous process tenderness to palpation.  Full range of motion of the lower back and hip.  Limited range of motion of the neck.  Reflexes 2+ in bilateral upper and lower extremities.  " Sensation currently intact.  There is swelling behind the right knee.  There is crepitus in the knee with active and passive range of motion.  No joint instability.      Assessment and Plan:   The following treatment plan was discussed    1. Health maintenance examination  Age-appropriate counseling given, recommended Shingrix and she will check back for this, labs ordered, colon cancer screening and mammogram up-to-date  - TSH; Future  - FREE THYROXINE; Future  - CBC WITH DIFFERENTIAL; Future  - Comp Metabolic Panel; Future  - Lipid Profile; Future  - VITAMIN D,25 HYDROXY; Future    2. Thyroid nodule  Chronic, uncontrolled, recheck ultrasound, will likely need excision.  She has a follow-up with her ENT in a couple of weeks  - US-SOFT TISSUES OF HEAD - NECK; Future  - TSH; Future  - FREE THYROXINE; Future  - CBC WITH DIFFERENTIAL; Future  - Comp Metabolic Panel; Future  - Lipid Profile; Future  - VITAMIN D,25 HYDROXY; Future    3. Cervical radiculopathy  New, uncontrolled, MRI ordered and referral to physical therapy  - MR-CERVICAL SPINE-W/O; Future  - REFERRAL TO PHYSICAL THERAPY Reason for Therapy: Eval/Treat/Report    4. Lumbar radiculopathy  New, uncontrolled, MRI ordered and referral to physical therapy  - MR-LUMBAR SPINE-W/O; Future  - REFERRAL TO PHYSICAL THERAPY Reason for Therapy: Eval/Treat/Report    5. Baker's cyst of knee, right  Chronic, likely meniscus tear causing reaccumulation of fluid.  MRI ordered  - REFERRAL TO PHYSICAL THERAPY Reason for Therapy: Eval/Treat/Report    6. Vitamin D deficiency  Chronic, stable, recheck labs  - TSH; Future  - FREE THYROXINE; Future  - CBC WITH DIFFERENTIAL; Future  - Comp Metabolic Panel; Future  - Lipid Profile; Future  - VITAMIN D,25 HYDROXY; Future    7. Mixed hyperlipidemia  Chronic, unstable, recheck labs  - TSH; Future  - FREE THYROXINE; Future  - CBC WITH DIFFERENTIAL; Future  - Comp Metabolic Panel; Future  - Lipid Profile; Future  - VITAMIN D,25 HYDROXY;  Future    8. Chronic pain of right knee  - MR-KNEE-W/O RIGHT; Future    9. Weight gain  New, unstable, likely due to inability to exercise.  Discussed importance of work life balance.  Labs ordered      Followup: Return in about 1 year (around 3/12/2020), or if symptoms worsen or fail to improve, for Annual.       This note was created using voice recognition software. I have made every reasonable attempt to correct errors, however, I do anticipate some grammatical errors.    Home

## 2020-04-01 NOTE — ED PROVIDER NOTE - OBJECTIVE STATEMENT
65M with HTN, T2DM on insulin, HLD, ?copd presents to ED for evaluation of shortness of breath x 1 day and close contact with COVID coworker. Patient denies fever, chills, N/V, diarrhea. Currently satting 74% on NRB, imrpoved with proning to 95% on NRB. On multiple anti-hypertensives and humalog TID, last taken yesterday.

## 2020-04-01 NOTE — ED PROVIDER NOTE - PHYSICAL EXAMINATION
General: mod respiratory distress  Neurology: A&Ox3, nonfocal, PIKE x 4  Eyes: PERRLA/ EOMI, Gross vision intact  ENT/Neck: Neck supple, trachea midline, No JVD, Gross hearing intact  Respiratory: CTA B/L, No wheezing, rales, rhonchi  CV: RRR, S1S2, no murmurs, rubs or gallops  Abdominal: Soft, NT, ND +BS,   Extremities: No edema, + peripheral pulses  Skin: No Rashes, Hematoma, Ecchymosis

## 2020-04-01 NOTE — ED ADULT NURSE NOTE - NSIMPLEMENTINTERV_GEN_ALL_ED
Implemented All Universal Safety Interventions:  [place] to call system. Call bell, personal items and telephone within reach. Instruct patient to call for assistance. Room bathroom lighting operational. Non-slip footwear when patient is off stretcher. Physically safe environment: no spills, clutter or unnecessary equipment. Stretcher in lowest position, wheels locked, appropriate side rails in place.

## 2020-04-02 DIAGNOSIS — E11.65 TYPE 2 DIABETES MELLITUS WITH HYPERGLYCEMIA: ICD-10-CM

## 2020-04-02 DIAGNOSIS — I10 ESSENTIAL (PRIMARY) HYPERTENSION: ICD-10-CM

## 2020-04-02 DIAGNOSIS — I50.9 HEART FAILURE, UNSPECIFIED: ICD-10-CM

## 2020-04-02 DIAGNOSIS — R68.89 OTHER GENERAL SYMPTOMS AND SIGNS: ICD-10-CM

## 2020-04-02 DIAGNOSIS — Z29.9 ENCOUNTER FOR PROPHYLACTIC MEASURES, UNSPECIFIED: ICD-10-CM

## 2020-04-02 DIAGNOSIS — E11.9 TYPE 2 DIABETES MELLITUS WITHOUT COMPLICATIONS: ICD-10-CM

## 2020-04-02 DIAGNOSIS — J44.9 CHRONIC OBSTRUCTIVE PULMONARY DISEASE, UNSPECIFIED: ICD-10-CM

## 2020-04-02 DIAGNOSIS — N17.9 ACUTE KIDNEY FAILURE, UNSPECIFIED: ICD-10-CM

## 2020-04-02 LAB
ALBUMIN SERPL ELPH-MCNC: 2.4 G/DL — LOW (ref 3.3–5)
ALBUMIN SERPL ELPH-MCNC: 2.6 G/DL — LOW (ref 3.3–5)
ALBUMIN SERPL ELPH-MCNC: 2.8 G/DL — LOW (ref 3.3–5)
ALP SERPL-CCNC: 101 U/L — SIGNIFICANT CHANGE UP (ref 40–120)
ALP SERPL-CCNC: 93 U/L — SIGNIFICANT CHANGE UP (ref 40–120)
ALP SERPL-CCNC: 97 U/L — SIGNIFICANT CHANGE UP (ref 40–120)
ALT FLD-CCNC: 31 U/L — SIGNIFICANT CHANGE UP (ref 4–41)
ALT FLD-CCNC: 32 U/L — SIGNIFICANT CHANGE UP (ref 4–41)
ALT FLD-CCNC: 35 U/L — SIGNIFICANT CHANGE UP (ref 4–41)
ANION GAP SERPL CALC-SCNC: 16 MMO/L — HIGH (ref 7–14)
ANION GAP SERPL CALC-SCNC: 16 MMO/L — HIGH (ref 7–14)
ANION GAP SERPL CALC-SCNC: 21 MMO/L — HIGH (ref 7–14)
APPEARANCE UR: SIGNIFICANT CHANGE UP
AST SERPL-CCNC: 27 U/L — SIGNIFICANT CHANGE UP (ref 4–40)
AST SERPL-CCNC: 28 U/L — SIGNIFICANT CHANGE UP (ref 4–40)
AST SERPL-CCNC: 40 U/L — SIGNIFICANT CHANGE UP (ref 4–40)
BACTERIA # UR AUTO: SIGNIFICANT CHANGE UP
BASE EXCESS BLDV CALC-SCNC: -1.1 MMOL/L — SIGNIFICANT CHANGE UP
BASE EXCESS BLDV CALC-SCNC: 2 MMOL/L — SIGNIFICANT CHANGE UP
BASOPHILS # BLD AUTO: 0.03 K/UL — SIGNIFICANT CHANGE UP (ref 0–0.2)
BASOPHILS NFR BLD AUTO: 0.3 % — SIGNIFICANT CHANGE UP (ref 0–2)
BILIRUB SERPL-MCNC: 0.2 MG/DL — SIGNIFICANT CHANGE UP (ref 0.2–1.2)
BILIRUB SERPL-MCNC: 0.2 MG/DL — SIGNIFICANT CHANGE UP (ref 0.2–1.2)
BILIRUB SERPL-MCNC: 0.3 MG/DL — SIGNIFICANT CHANGE UP (ref 0.2–1.2)
BILIRUB UR-MCNC: NEGATIVE — SIGNIFICANT CHANGE UP
BLOOD GAS VENOUS - CREATININE: 4.64 MG/DL — HIGH (ref 0.5–1.3)
BLOOD GAS VENOUS - CREATININE: 4.98 MG/DL — HIGH (ref 0.5–1.3)
BLOOD GAS VENOUS - FIO2: 21 — SIGNIFICANT CHANGE UP
BLOOD GAS VENOUS - FIO2: 21 — SIGNIFICANT CHANGE UP
BLOOD UR QL VISUAL: HIGH
BUN SERPL-MCNC: 103 MG/DL — HIGH (ref 7–23)
BUN SERPL-MCNC: 105 MG/DL — HIGH (ref 7–23)
BUN SERPL-MCNC: 96 MG/DL — HIGH (ref 7–23)
C3 SERPL-MCNC: 187.5 MG/DL — HIGH (ref 90–180)
C4 SERPL-MCNC: 59.1 MG/DL — HIGH (ref 10–40)
CALCIUM SERPL-MCNC: 8.6 MG/DL — SIGNIFICANT CHANGE UP (ref 8.4–10.5)
CALCIUM SERPL-MCNC: 9 MG/DL — SIGNIFICANT CHANGE UP (ref 8.4–10.5)
CALCIUM SERPL-MCNC: 9.1 MG/DL — SIGNIFICANT CHANGE UP (ref 8.4–10.5)
CHLORIDE BLDV-SCNC: 104 MMOL/L — SIGNIFICANT CHANGE UP (ref 96–108)
CHLORIDE BLDV-SCNC: 106 MMOL/L — SIGNIFICANT CHANGE UP (ref 96–108)
CHLORIDE SERPL-SCNC: 98 MMOL/L — SIGNIFICANT CHANGE UP (ref 98–107)
CHLORIDE SERPL-SCNC: 99 MMOL/L — SIGNIFICANT CHANGE UP (ref 98–107)
CHLORIDE SERPL-SCNC: 99 MMOL/L — SIGNIFICANT CHANGE UP (ref 98–107)
CHLORIDE UR-SCNC: < 20 MMOL/L — SIGNIFICANT CHANGE UP
CO2 SERPL-SCNC: 21 MMOL/L — LOW (ref 22–31)
CO2 SERPL-SCNC: 22 MMOL/L — SIGNIFICANT CHANGE UP (ref 22–31)
CO2 SERPL-SCNC: 23 MMOL/L — SIGNIFICANT CHANGE UP (ref 22–31)
COLOR SPEC: YELLOW — SIGNIFICANT CHANGE UP
CREAT SERPL-MCNC: 4.82 MG/DL — HIGH (ref 0.5–1.3)
CREAT SERPL-MCNC: 4.84 MG/DL — HIGH (ref 0.5–1.3)
CREAT SERPL-MCNC: 4.85 MG/DL — HIGH (ref 0.5–1.3)
CRP SERPL-MCNC: 99.8 MG/L — HIGH
EOSINOPHIL # BLD AUTO: 0 K/UL — SIGNIFICANT CHANGE UP (ref 0–0.5)
EOSINOPHIL NFR BLD AUTO: 0 % — SIGNIFICANT CHANGE UP (ref 0–6)
FERRITIN SERPL-MCNC: 821.8 NG/ML — HIGH (ref 30–400)
GAS PNL BLDV: 137 MMOL/L — SIGNIFICANT CHANGE UP (ref 136–146)
GAS PNL BLDV: 137 MMOL/L — SIGNIFICANT CHANGE UP (ref 136–146)
GLUCOSE BLDC GLUCOMTR-MCNC: 327 MG/DL — HIGH (ref 70–99)
GLUCOSE BLDC GLUCOMTR-MCNC: 376 MG/DL — HIGH (ref 70–99)
GLUCOSE BLDC GLUCOMTR-MCNC: 398 MG/DL — HIGH (ref 70–99)
GLUCOSE BLDC GLUCOMTR-MCNC: 428 MG/DL — HIGH (ref 70–99)
GLUCOSE BLDV-MCNC: 322 MG/DL — HIGH (ref 70–99)
GLUCOSE BLDV-MCNC: 433 MG/DL — HIGH (ref 70–99)
GLUCOSE SERPL-MCNC: 367 MG/DL — HIGH (ref 70–99)
GLUCOSE SERPL-MCNC: 403 MG/DL — HIGH (ref 70–99)
GLUCOSE SERPL-MCNC: 470 MG/DL — CRITICAL HIGH (ref 70–99)
GLUCOSE UR-MCNC: 500 — HIGH
HBA1C BLD-MCNC: 8.7 % — HIGH (ref 4–5.6)
HBA1C BLD-MCNC: 8.9 % — HIGH (ref 4–5.6)
HBV SURFACE AG SER-ACNC: NEGATIVE — SIGNIFICANT CHANGE UP
HCO3 BLDV-SCNC: 22 MMOL/L — SIGNIFICANT CHANGE UP (ref 20–27)
HCO3 BLDV-SCNC: 23 MMOL/L — SIGNIFICANT CHANGE UP (ref 20–27)
HCT VFR BLD CALC: 45.4 % — SIGNIFICANT CHANGE UP (ref 39–50)
HCT VFR BLD CALC: 45.5 % — SIGNIFICANT CHANGE UP (ref 39–50)
HCT VFR BLDV CALC: 44.9 % — SIGNIFICANT CHANGE UP (ref 39–51)
HCT VFR BLDV CALC: 46 % — SIGNIFICANT CHANGE UP (ref 39–51)
HGB BLD-MCNC: 14.8 G/DL — SIGNIFICANT CHANGE UP (ref 13–17)
HGB BLD-MCNC: 15 G/DL — SIGNIFICANT CHANGE UP (ref 13–17)
HGB BLDV-MCNC: 14.7 G/DL — SIGNIFICANT CHANGE UP (ref 13–17)
HGB BLDV-MCNC: 15 G/DL — SIGNIFICANT CHANGE UP (ref 13–17)
IMM GRANULOCYTES NFR BLD AUTO: 1.6 % — HIGH (ref 0–1.5)
KETONES UR-MCNC: NEGATIVE — SIGNIFICANT CHANGE UP
LACTATE BLDV-MCNC: 1.6 MMOL/L — SIGNIFICANT CHANGE UP (ref 0.5–2)
LACTATE BLDV-MCNC: 1.6 MMOL/L — SIGNIFICANT CHANGE UP (ref 0.5–2)
LDH SERPL L TO P-CCNC: 688 U/L — HIGH (ref 135–225)
LEUKOCYTE ESTERASE UR-ACNC: NEGATIVE — SIGNIFICANT CHANGE UP
LYMPHOCYTES # BLD AUTO: 0.56 K/UL — LOW (ref 1–3.3)
LYMPHOCYTES # BLD AUTO: 4.9 % — LOW (ref 13–44)
MAGNESIUM SERPL-MCNC: 2.8 MG/DL — HIGH (ref 1.6–2.6)
MAGNESIUM SERPL-MCNC: 2.9 MG/DL — HIGH (ref 1.6–2.6)
MCHC RBC-ENTMCNC: 28.5 PG — SIGNIFICANT CHANGE UP (ref 27–34)
MCHC RBC-ENTMCNC: 29 PG — SIGNIFICANT CHANGE UP (ref 27–34)
MCHC RBC-ENTMCNC: 32.6 % — SIGNIFICANT CHANGE UP (ref 32–36)
MCHC RBC-ENTMCNC: 33 % — SIGNIFICANT CHANGE UP (ref 32–36)
MCV RBC AUTO: 87.3 FL — SIGNIFICANT CHANGE UP (ref 80–100)
MCV RBC AUTO: 87.8 FL — SIGNIFICANT CHANGE UP (ref 80–100)
MONOCYTES # BLD AUTO: 0.22 K/UL — SIGNIFICANT CHANGE UP (ref 0–0.9)
MONOCYTES NFR BLD AUTO: 1.9 % — LOW (ref 2–14)
NEUTROPHILS # BLD AUTO: 10.35 K/UL — HIGH (ref 1.8–7.4)
NEUTROPHILS NFR BLD AUTO: 91.3 % — HIGH (ref 43–77)
NITRITE UR-MCNC: NEGATIVE — SIGNIFICANT CHANGE UP
NRBC # FLD: 0.02 K/UL — SIGNIFICANT CHANGE UP (ref 0–0)
NRBC # FLD: 0.02 K/UL — SIGNIFICANT CHANGE UP (ref 0–0)
NT-PROBNP SERPL-SCNC: 1906 PG/ML — SIGNIFICANT CHANGE UP
PCO2 BLDV: 61 MMHG — HIGH (ref 41–51)
PCO2 BLDV: 73 MMHG — HIGH (ref 41–51)
PH BLDV: 7.22 PH — LOW (ref 7.32–7.43)
PH BLDV: 7.24 PH — LOW (ref 7.32–7.43)
PH UR: 6 — SIGNIFICANT CHANGE UP (ref 5–8)
PHOSPHATE SERPL-MCNC: 6 MG/DL — HIGH (ref 2.5–4.5)
PHOSPHATE SERPL-MCNC: 6.5 MG/DL — HIGH (ref 2.5–4.5)
PLATELET # BLD AUTO: 330 K/UL — SIGNIFICANT CHANGE UP (ref 150–400)
PLATELET # BLD AUTO: 376 K/UL — SIGNIFICANT CHANGE UP (ref 150–400)
PMV BLD: 10 FL — SIGNIFICANT CHANGE UP (ref 7–13)
PMV BLD: 10.5 FL — SIGNIFICANT CHANGE UP (ref 7–13)
PO2 BLDV: 43 MMHG — HIGH (ref 35–40)
PO2 BLDV: 56 MMHG — HIGH (ref 35–40)
POTASSIUM BLDV-SCNC: 5.1 MMOL/L — HIGH (ref 3.4–4.5)
POTASSIUM BLDV-SCNC: 5.4 MMOL/L — HIGH (ref 3.4–4.5)
POTASSIUM SERPL-MCNC: 4.6 MMOL/L — SIGNIFICANT CHANGE UP (ref 3.5–5.3)
POTASSIUM SERPL-MCNC: 4.8 MMOL/L — SIGNIFICANT CHANGE UP (ref 3.5–5.3)
POTASSIUM SERPL-MCNC: SIGNIFICANT CHANGE UP MMOL/L (ref 3.5–5.3)
POTASSIUM SERPL-SCNC: 4.6 MMOL/L — SIGNIFICANT CHANGE UP (ref 3.5–5.3)
POTASSIUM SERPL-SCNC: 4.8 MMOL/L — SIGNIFICANT CHANGE UP (ref 3.5–5.3)
POTASSIUM SERPL-SCNC: SIGNIFICANT CHANGE UP MMOL/L (ref 3.5–5.3)
POTASSIUM UR-SCNC: 46.1 MMOL/L — SIGNIFICANT CHANGE UP
PROT SERPL-MCNC: 6.1 G/DL — SIGNIFICANT CHANGE UP (ref 6–8.3)
PROT SERPL-MCNC: 7 G/DL — SIGNIFICANT CHANGE UP (ref 6–8.3)
PROT SERPL-MCNC: 7.3 G/DL — SIGNIFICANT CHANGE UP (ref 6–8.3)
PROT UR-MCNC: 600 — HIGH
RBC # BLD: 5.18 M/UL — SIGNIFICANT CHANGE UP (ref 4.2–5.8)
RBC # BLD: 5.2 M/UL — SIGNIFICANT CHANGE UP (ref 4.2–5.8)
RBC # FLD: 13.8 % — SIGNIFICANT CHANGE UP (ref 10.3–14.5)
RBC # FLD: 14.1 % — SIGNIFICANT CHANGE UP (ref 10.3–14.5)
RBC CASTS # UR COMP ASSIST: SIGNIFICANT CHANGE UP (ref 0–?)
SAO2 % BLDV: 62.5 % — SIGNIFICANT CHANGE UP (ref 60–85)
SAO2 % BLDV: 79.4 % — SIGNIFICANT CHANGE UP (ref 60–85)
SARS-COV-2 RNA SPEC QL NAA+PROBE: DETECTED
SODIUM SERPL-SCNC: 136 MMOL/L — SIGNIFICANT CHANGE UP (ref 135–145)
SODIUM SERPL-SCNC: 138 MMOL/L — SIGNIFICANT CHANGE UP (ref 135–145)
SODIUM SERPL-SCNC: 141 MMOL/L — SIGNIFICANT CHANGE UP (ref 135–145)
SODIUM UR-SCNC: < 20 MMOL/L — SIGNIFICANT CHANGE UP
SP GR SPEC: 1.02 — SIGNIFICANT CHANGE UP (ref 1–1.04)
SQUAMOUS # UR AUTO: SIGNIFICANT CHANGE UP
TROPONIN T, HIGH SENSITIVITY: 45 NG/L — SIGNIFICANT CHANGE UP (ref ?–14)
UROBILINOGEN FLD QL: NORMAL — SIGNIFICANT CHANGE UP
WBC # BLD: 11.34 K/UL — HIGH (ref 3.8–10.5)
WBC # BLD: 8.95 K/UL — SIGNIFICANT CHANGE UP (ref 3.8–10.5)
WBC # FLD AUTO: 11.34 K/UL — HIGH (ref 3.8–10.5)
WBC # FLD AUTO: 8.95 K/UL — SIGNIFICANT CHANGE UP (ref 3.8–10.5)
WBC UR QL: HIGH (ref 0–?)

## 2020-04-02 PROCEDURE — 93010 ELECTROCARDIOGRAM REPORT: CPT

## 2020-04-02 PROCEDURE — 71250 CT THORAX DX C-: CPT | Mod: 26

## 2020-04-02 PROCEDURE — 99254 IP/OBS CNSLTJ NEW/EST MOD 60: CPT | Mod: GC

## 2020-04-02 PROCEDURE — 76770 US EXAM ABDO BACK WALL COMP: CPT | Mod: 26

## 2020-04-02 PROCEDURE — 99232 SBSQ HOSP IP/OBS MODERATE 35: CPT

## 2020-04-02 RX ORDER — INSULIN LISPRO 100/ML
VIAL (ML) SUBCUTANEOUS
Refills: 0 | Status: DISCONTINUED | OUTPATIENT
Start: 2020-04-02 | End: 2020-04-03

## 2020-04-02 RX ORDER — INSULIN GLARGINE 100 [IU]/ML
60 INJECTION, SOLUTION SUBCUTANEOUS AT BEDTIME
Refills: 0 | Status: DISCONTINUED | OUTPATIENT
Start: 2020-04-02 | End: 2020-04-03

## 2020-04-02 RX ORDER — PREGABALIN 225 MG/1
1 CAPSULE ORAL
Qty: 0 | Refills: 0 | DISCHARGE

## 2020-04-02 RX ORDER — DEXTROSE 50 % IN WATER 50 %
25 SYRINGE (ML) INTRAVENOUS ONCE
Refills: 0 | Status: DISCONTINUED | OUTPATIENT
Start: 2020-04-02 | End: 2020-04-12

## 2020-04-02 RX ORDER — LABETALOL HCL 100 MG
300 TABLET ORAL
Refills: 0 | Status: DISCONTINUED | OUTPATIENT
Start: 2020-04-02 | End: 2020-04-12

## 2020-04-02 RX ORDER — SODIUM CHLORIDE 9 MG/ML
1000 INJECTION, SOLUTION INTRAVENOUS
Refills: 0 | Status: DISCONTINUED | OUTPATIENT
Start: 2020-04-02 | End: 2020-04-12

## 2020-04-02 RX ORDER — HYDROXYCHLOROQUINE SULFATE 200 MG
TABLET ORAL
Refills: 0 | Status: DISCONTINUED | OUTPATIENT
Start: 2020-04-02 | End: 2020-04-03

## 2020-04-02 RX ORDER — ASPIRIN/CALCIUM CARB/MAGNESIUM 324 MG
81 TABLET ORAL DAILY
Refills: 0 | Status: DISCONTINUED | OUTPATIENT
Start: 2020-04-02 | End: 2020-04-12

## 2020-04-02 RX ORDER — INSULIN LISPRO 100/ML
VIAL (ML) SUBCUTANEOUS AT BEDTIME
Refills: 0 | Status: DISCONTINUED | OUTPATIENT
Start: 2020-04-02 | End: 2020-04-03

## 2020-04-02 RX ORDER — HYDROXYCHLOROQUINE SULFATE 200 MG
TABLET ORAL
Refills: 0 | Status: COMPLETED | OUTPATIENT
Start: 2020-04-02 | End: 2020-04-07

## 2020-04-02 RX ORDER — AMLODIPINE BESYLATE 2.5 MG/1
10 TABLET ORAL DAILY
Refills: 0 | Status: DISCONTINUED | OUTPATIENT
Start: 2020-04-02 | End: 2020-04-12

## 2020-04-02 RX ORDER — INSULIN GLARGINE 100 [IU]/ML
50 INJECTION, SOLUTION SUBCUTANEOUS AT BEDTIME
Refills: 0 | Status: DISCONTINUED | OUTPATIENT
Start: 2020-04-02 | End: 2020-04-02

## 2020-04-02 RX ORDER — INSULIN GLARGINE 100 [IU]/ML
20 INJECTION, SOLUTION SUBCUTANEOUS ONCE
Refills: 0 | Status: DISCONTINUED | OUTPATIENT
Start: 2020-04-02 | End: 2020-04-02

## 2020-04-02 RX ORDER — HYDROXYCHLOROQUINE SULFATE 200 MG
200 TABLET ORAL EVERY 12 HOURS
Refills: 0 | Status: COMPLETED | OUTPATIENT
Start: 2020-04-03 | End: 2020-04-07

## 2020-04-02 RX ORDER — HYDROXYCHLOROQUINE SULFATE 200 MG
400 TABLET ORAL EVERY 12 HOURS
Refills: 0 | Status: COMPLETED | OUTPATIENT
Start: 2020-04-02 | End: 2020-04-03

## 2020-04-02 RX ORDER — TIOTROPIUM BROMIDE 18 UG/1
1 CAPSULE ORAL; RESPIRATORY (INHALATION) DAILY
Refills: 0 | Status: DISCONTINUED | OUTPATIENT
Start: 2020-04-02 | End: 2020-04-12

## 2020-04-02 RX ORDER — HEPARIN SODIUM 5000 [USP'U]/ML
5000 INJECTION INTRAVENOUS; SUBCUTANEOUS EVERY 8 HOURS
Refills: 0 | Status: DISCONTINUED | OUTPATIENT
Start: 2020-04-02 | End: 2020-04-12

## 2020-04-02 RX ORDER — DEXTROSE 50 % IN WATER 50 %
15 SYRINGE (ML) INTRAVENOUS ONCE
Refills: 0 | Status: DISCONTINUED | OUTPATIENT
Start: 2020-04-02 | End: 2020-04-12

## 2020-04-02 RX ORDER — ERGOCALCIFEROL 1.25 MG/1
1 CAPSULE ORAL
Qty: 0 | Refills: 0 | DISCHARGE

## 2020-04-02 RX ORDER — INSULIN LISPRO 100/ML
8 VIAL (ML) SUBCUTANEOUS
Refills: 0 | Status: DISCONTINUED | OUTPATIENT
Start: 2020-04-02 | End: 2020-04-03

## 2020-04-02 RX ORDER — ASPIRIN/CALCIUM CARB/MAGNESIUM 324 MG
1 TABLET ORAL
Qty: 0 | Refills: 0 | DISCHARGE

## 2020-04-02 RX ORDER — BUDESONIDE AND FORMOTEROL FUMARATE DIHYDRATE 160; 4.5 UG/1; UG/1
2 AEROSOL RESPIRATORY (INHALATION)
Refills: 0 | Status: DISCONTINUED | OUTPATIENT
Start: 2020-04-02 | End: 2020-04-12

## 2020-04-02 RX ORDER — FUROSEMIDE 40 MG
40 TABLET ORAL ONCE
Refills: 0 | Status: COMPLETED | OUTPATIENT
Start: 2020-04-02 | End: 2020-04-02

## 2020-04-02 RX ORDER — DEXTROSE 50 % IN WATER 50 %
12.5 SYRINGE (ML) INTRAVENOUS ONCE
Refills: 0 | Status: DISCONTINUED | OUTPATIENT
Start: 2020-04-02 | End: 2020-04-12

## 2020-04-02 RX ORDER — CEFTRIAXONE 500 MG/1
1000 INJECTION, POWDER, FOR SOLUTION INTRAMUSCULAR; INTRAVENOUS EVERY 24 HOURS
Refills: 0 | Status: DISCONTINUED | OUTPATIENT
Start: 2020-04-02 | End: 2020-04-03

## 2020-04-02 RX ORDER — GLUCAGON INJECTION, SOLUTION 0.5 MG/.1ML
1 INJECTION, SOLUTION SUBCUTANEOUS ONCE
Refills: 0 | Status: DISCONTINUED | OUTPATIENT
Start: 2020-04-02 | End: 2020-04-12

## 2020-04-02 RX ORDER — AZITHROMYCIN 500 MG/1
500 TABLET, FILM COATED ORAL DAILY
Refills: 0 | Status: DISCONTINUED | OUTPATIENT
Start: 2020-04-02 | End: 2020-04-02

## 2020-04-02 RX ORDER — ALBUTEROL 90 UG/1
2 AEROSOL, METERED ORAL EVERY 6 HOURS
Refills: 0 | Status: DISCONTINUED | OUTPATIENT
Start: 2020-04-02 | End: 2020-04-12

## 2020-04-02 RX ORDER — DOXAZOSIN MESYLATE 4 MG
1 TABLET ORAL
Qty: 0 | Refills: 0 | DISCHARGE

## 2020-04-02 RX ADMIN — AMLODIPINE BESYLATE 10 MILLIGRAM(S): 2.5 TABLET ORAL at 08:54

## 2020-04-02 RX ADMIN — CEFTRIAXONE 100 MILLIGRAM(S): 500 INJECTION, POWDER, FOR SOLUTION INTRAMUSCULAR; INTRAVENOUS at 03:57

## 2020-04-02 RX ADMIN — Medication 5: at 12:45

## 2020-04-02 RX ADMIN — Medication 8 UNIT(S): at 17:15

## 2020-04-02 RX ADMIN — Medication 300 MILLIGRAM(S): at 08:53

## 2020-04-02 RX ADMIN — TIOTROPIUM BROMIDE 1 CAPSULE(S): 18 CAPSULE ORAL; RESPIRATORY (INHALATION) at 12:03

## 2020-04-02 RX ADMIN — Medication 1: at 22:07

## 2020-04-02 RX ADMIN — Medication 40 MILLIGRAM(S): at 01:13

## 2020-04-02 RX ADMIN — Medication 6: at 08:18

## 2020-04-02 RX ADMIN — ALBUTEROL 2 PUFF(S): 90 AEROSOL, METERED ORAL at 02:36

## 2020-04-02 RX ADMIN — INSULIN GLARGINE 50 UNIT(S): 100 INJECTION, SOLUTION SUBCUTANEOUS at 05:08

## 2020-04-02 RX ADMIN — INSULIN GLARGINE 60 UNIT(S): 100 INJECTION, SOLUTION SUBCUTANEOUS at 22:06

## 2020-04-02 RX ADMIN — Medication 40 MILLIGRAM(S): at 17:19

## 2020-04-02 RX ADMIN — HEPARIN SODIUM 5000 UNIT(S): 5000 INJECTION INTRAVENOUS; SUBCUTANEOUS at 22:22

## 2020-04-02 RX ADMIN — Medication 4: at 17:16

## 2020-04-02 RX ADMIN — AZITHROMYCIN 500 MILLIGRAM(S): 500 TABLET, FILM COATED ORAL at 14:29

## 2020-04-02 RX ADMIN — Medication 300 MILLIGRAM(S): at 17:19

## 2020-04-02 RX ADMIN — Medication 400 MILLIGRAM(S): at 17:26

## 2020-04-02 RX ADMIN — Medication 0.2 MILLIGRAM(S): at 08:52

## 2020-04-02 RX ADMIN — HEPARIN SODIUM 5000 UNIT(S): 5000 INJECTION INTRAVENOUS; SUBCUTANEOUS at 12:47

## 2020-04-02 RX ADMIN — BUDESONIDE AND FORMOTEROL FUMARATE DIHYDRATE 2 PUFF(S): 160; 4.5 AEROSOL RESPIRATORY (INHALATION) at 21:06

## 2020-04-02 RX ADMIN — Medication 0.2 MILLIGRAM(S): at 22:52

## 2020-04-02 RX ADMIN — Medication 40 MILLIGRAM(S): at 08:53

## 2020-04-02 RX ADMIN — ALBUTEROL 2 PUFF(S): 90 AEROSOL, METERED ORAL at 17:18

## 2020-04-02 RX ADMIN — ALBUTEROL 2 PUFF(S): 90 AEROSOL, METERED ORAL at 22:06

## 2020-04-02 RX ADMIN — ALBUTEROL 2 PUFF(S): 90 AEROSOL, METERED ORAL at 08:59

## 2020-04-02 RX ADMIN — Medication 81 MILLIGRAM(S): at 12:47

## 2020-04-02 NOTE — ED ADULT NURSE REASSESSMENT NOTE - NS ED NURSE REASSESS COMMENT FT1
Called pharmacy multiple times, pharmacy states lantus is being sent. awaiting lantus at this time. Scribe Attestation (For Scribes USE Only)... Attending Attestation (For Attendings USE Only).../Scribe Attestation (For Scribes USE Only)...

## 2020-04-02 NOTE — H&P ADULT - NSICDXPASTMEDICALHX_GEN_ALL_CORE_FT
PAST MEDICAL HISTORY:  Cholelithiasis     COPD (chronic obstructive pulmonary disease)     Diabetes fs this am 120    Heart murmur recent dx    Hypertension

## 2020-04-02 NOTE — CONSULT NOTE ADULT - ASSESSMENT
ECHO  5/10/19 minimial MR, moderate bi-atrial enlargement, nl LV sys fx, ef 67%, mod diastolic dysfx stage 2    a/p  65 year old male with hx Hypertension, Diabetes Mellitus, Aortic Regurgitation, Diastolic Heart Failure, CBD Stone, s/p Stent, PAD  shortness of breath x 1 day. Admitted for suspected COVID pneumonia vs. CHF exacerbation.    1. SOB; r/o covid infection   pending PCR   ct chest with bl predominatly grounglass opacity infiltrates without associated pleural effusions ? atypical pna   if diagnosis of COVID confirmed check ecg  prior to starting hydroxychloroquine to eval QT   ID / med f/u   02 supplementation     2.  Chronic Diastolic CHF   s/p 40 mg IVP lasix x1; volume status appears stable   monitor for fluid overloaded   c/w BB   creat noted; lasix prn for now     3. Aortic Regurgitation  cv stable     dvt ppx ECHO  5/10/19 minimial MR, moderate bi-atrial enlargement, nl LV sys fx, ef 67%, mod diastolic dysfx stage 2    a/p  65 year old male with hx Hypertension, Diabetes Mellitus, Aortic Regurgitation, Diastolic Heart Failure, CBD Stone, s/p Stent, PAD  shortness of breath x 1 day. Admitted for suspected COVID pneumonia vs. CHF exacerbation.    1. SOB; r/o covid infection   pending PCR   ct chest with bl predominatly grounglass opacity infiltrates without associated pleural effusions ? atypical pna   if diagnosis of COVID confirmed check ecg  prior to starting hydroxychloroquine to eval QT   ID / med f/u   02 supplementation     2.  Chronic Diastolic CHF   s/p 40 mg IVP lasix x1; volume status appears stable   monitor for fluid overloaded   c/w BB   creat noted; lasix prn for now   repeat ECHO if feasible or bedside POCUS if pt deteriorates to eval LV function and AR     3. Aortic Regurgitation  cv stable     dvt ppx

## 2020-04-02 NOTE — H&P ADULT - PROBLEM SELECTOR PLAN 4
- Unclear if HF contributing to current status of resp distress.  - Check BNP and CT chest to differentiate pulm edema vs. infection. (however, utility of BNP in question as patient with concurrent NANCY.)  - Trial of lasix 40mg IV once. (patient on 40mg po at home).  - Strict I&Os.  - Check TTE if pulm edema/fluid overloading confirmed.

## 2020-04-02 NOTE — H&P ADULT - PROBLEM SELECTOR PLAN 3
- Unclear etiology: CHF exacerbation vs. prerenal fluid depletion.  - Will check BNP to help differentiate fluid overloading status.  - Hold lisinopril.  - Check urine lytes and trend Cr.

## 2020-04-02 NOTE — H&P ADULT - NSHPREVIEWOFSYSTEMS_GEN_ALL_CORE
per ED documentation    CONSTITUTIONAL: No weakness, fevers or chills  EYES/ENT: No visual changes;  No vertigo or throat pain   NECK: No pain or stiffness  RESPIRATORY: No cough, wheezing, hemoptysis; +ve shortness of breath  CARDIOVASCULAR: No chest pain or palpitations  GASTROINTESTINAL: No abdominal or epigastric pain. No nausea, vomiting, or hematemesis; No diarrhea or constipation. No melena or hematochezia.  GENITOURINARY: No dysuria, frequency or hematuria  NEUROLOGICAL: No numbness or weakness  SKIN: No itching, burning, rashes, or lesions   All other review of systems is negative unless indicated above.

## 2020-04-02 NOTE — PHARMACOTHERAPY INTERVENTION NOTE - COMMENTS
CIERA ROSA is a 65y Male who is COVID-19 positive with a positive chest x-ray/CT and increasing oxygen requirements.    Recommendations:  1. Agree with initiating hydroxychloroquine 400 mg PO q12h x 2 doses, then 200 mg PO q12h x 8 more doses (total of 5 days).   2. Please check ECG and ensure QTc is < 500 ms before administration.     Chanel Montalvo, PharmD  PGY-1 Pharmacy Resident  Spectra [phone]  .

## 2020-04-02 NOTE — CONSULT NOTE ADULT - SUBJECTIVE AND OBJECTIVE BOX
[facility] DIVISION OF KIDNEY DISEASES AND HYPERTENSION -- [phone]  -- INITIAL CONSULT NOTE  --------------------------------------------------------------------------------  HPI: 66yo M with HTN, T2DM on insulin, HLD, COPD presents to the ED with shortness of breath and exposure to COVID. Nephrology consulted for NANCY. [facility] reviewed, last outpatient Scr was 1.24 on 6/28/18. On arrival to the ER (4/1/20), Scr was 4.88 and repeat today was 4.2. Patient states he was feeling fine prior except for one day prior to arrival in the ER when he developed lower flank pain and SOB. Denies any other complaints. Patient denies any personal or family history of kidney disease. States he follows up with his PMD every three months and was never told of any kidney problems. Of note, patient on Lasix and Lisinopril (as per prescription writer) and reports compliance with his medications. Denies any NSAIDS, herbal supplements, or OTC medications. Denies any cola colored urine, foamy urine, dysuria, or hesitancy.     PAST HISTORY  --------------------------------------------------------------------------------  PAST MEDICAL & SURGICAL HISTORY:  Heart murmur: recent dx  Cholelithiasis  COPD (chronic obstructive pulmonary disease)  Diabetes: fs this am 120  Hypertension  S/P exploratory laparotomy: 23 years ago    FAMILY HISTORY:  No pertinent family history in first degree relatives    PAST SOCIAL HISTORY: patient lives at home, denies any EtOH or smoking    ALLERGIES & MEDICATIONS  --------------------------------------------------------------------------------  Allergies    No Known Allergies    Intolerances    Standing Inpatient Medications  ALBUTerol    90 MICROgram(s) HFA Inhaler 2 Puff(s) Inhalation every 6 hours  amLODIPine   Tablet 10 milliGRAM(s) Oral daily  aspirin  chewable 81 milliGRAM(s) Oral daily  azithromycin   Tablet 500 milliGRAM(s) Oral daily  budesonide 160 MICROgram(s)/formoterol 4.5 MICROgram(s) Inhaler 2 Puff(s) Inhalation two times a day  cefTRIAXone   IVPB 1000 milliGRAM(s) IV Intermittent every 24 hours  cloNIDine 0.2 milliGRAM(s) Oral two times a day  dextrose 5%. 1000 milliLiter(s) IV Continuous <Continuous>  dextrose 50% Injectable 12.5 Gram(s) IV Push once  dextrose 50% Injectable 25 Gram(s) IV Push once  dextrose 50% Injectable 25 Gram(s) IV Push once  heparin  Injectable 5000 Unit(s) SubCutaneous every 8 hours  insulin glargine Injectable (LANTUS) 20 Unit(s) SubCutaneous once  insulin glargine Injectable (LANTUS) 50 Unit(s) SubCutaneous at bedtime  insulin lispro (HumaLOG) corrective regimen sliding scale   SubCutaneous three times a day before meals  insulin lispro (HumaLOG) corrective regimen sliding scale   SubCutaneous at bedtime  labetalol 300 milliGRAM(s) Oral two times a day  methylPREDNISolone sodium succinate Injectable 40 milliGRAM(s) IV Push two times a day  tiotropium 18 MICROgram(s) Capsule 1 Capsule(s) Inhalation daily    REVIEW OF SYSTEMS  --------------------------------------------------------------------------------  Gen: no lethargy  Respiratory: No dyspnea  CV: No chest pain  GI: No abdominal pain  MSK: (+) lower back pain  Neuro: No dizziness  Heme: No bleeding    All other systems were reviewed and are negative, except as noted.    VITALS/PHYSICAL EXAM  --------------------------------------------------------------------------------  T(C): 36.5 (04-02-20 @ 07:11), Max: 37.1 (04-01-20 @ 20:55)  HR: 104 (04-02-20 @ 07:11) (87 - 105)  BP: 149/89 (04-02-20 @ 07:11) (149/89 - 178/80)  RR: 27 (04-02-20 @ 07:11) (20 - 27)  SpO2: 92% (04-02-20 @ 07:11) (92% - 98%)  Wt(kg): --    Physical Exam:  	Gen: NAD  	HEENT: MMM  	Pulm: CTA B/L  	CV: S1S2  	Abd: Soft, +BS   	Ext: No LE edema B/L  	Neuro: Awake  	Skin: Warm and dry  	  LABS/STUDIES  --------------------------------------------------------------------------------              15.0   11.34 >-----------<  330      [04-02-20 @ 06:04]              45.5     136  |  98  |  96  ----------------------------<  470      [04-02-20 @ 06:04]  Test not performed   SPECIMEN MODERATELY HEMOLYZED   |  22  |  4.82        Ca     9.0     [04-02-20 @ 06:04]      Mg     2.8     [04-02-20 @ 06:04]      Phos  6.5     [04-02-20 @ 06:04]    TPro  7.3  /  Alb  2.4  /  TBili  0.3  /  DBili  x   /  AST  40  /  ALT  35  /  AlkPhos  97  [04-02-20 @ 06:04]          [04-01-20 @ 22:20]    Creatinine Trend:  SCr 4.82 [04-02 @ 06:04]  SCr 4.88 [04-01 @ 20:44]    Ferritin 867.9      [04-01-20 @ 22:20]  HbA1c 7.1      [06-25-18 @ 19:06]    ANTONI: titer 1:80, pattern Speckled      [09-30-15 @ 14:57]

## 2020-04-02 NOTE — ED ADULT NURSE REASSESSMENT NOTE - NS ED NURSE REASSESS COMMENT FT1
Md Brown made aware of patient's O2 saturation. Md Brown aware patient placed on non-rebreather. Will continue to monitor.

## 2020-04-02 NOTE — ED ADULT NURSE REASSESSMENT NOTE - NS ED NURSE REASSESS COMMENT FT1
ENDORSED to rn aly. pt a&o x3, nad noted. vss. as per mar, goal o2 sat is between 88-92%. pt taken off nonrebreather and tolerating 3l nc at 90-92%. speaking in complete sentences. airborne and contact precautions in place. safety maintained.

## 2020-04-02 NOTE — CONSULT NOTE ADULT - CONSULT REASON
hypoxa/ cards eval    hx:  Hypertension, Diabetes Mellitus, Aortic Regurgitation, Diastolic Heart Failure, CBD Stone, s/p Stent, PAD

## 2020-04-02 NOTE — PROGRESS NOTE ADULT - PROBLEM SELECTOR PLAN 4
-Increase Lantus to 60 units at bedtime ( home dose)  -For now on Humalog 8 units TID with meals   -Moderate correctional scale

## 2020-04-02 NOTE — H&P ADULT - NSHPLABSRESULTS_GEN_ALL_CORE
(04-01 @ 20:44)                      14.6  12.20 )-----------( 323                 45.5    Neutrophils = 10.36 (84.9%)  Lymphocytes = 0.89 (7.3%)  Eosinophils = 0.01 (0.1%)  Basophils = 0.03 (0.2%)  Monocytes = 0.72 (5.9%)  Bands = --%    04-01    135  |  96<L>  |  89<H>  ----------------------------<  375<H>  4.5   |  21<L>  |  4.88<H>    Ca    8.9      01 Apr 2020 20:44  Phos  4.9     04-01  Mg     2.6     04-01    TPro  7.2  /  Alb  2.5<L>  /  TBili  0.3  /  DBili  x   /  AST  47<H>  /  ALT  32  /  AlkPhos  96  04-01    Venous Blood Gas:  04-02 @ 00:50  7.22/73/43/23/62.5  VBG Lactate: 1.6  Venous Blood Gas:  04-01 @ 20:44  7.29/59/26/23/38.1  VBG Lactate: 1.9

## 2020-04-02 NOTE — CONSULT NOTE ADULT - ATTENDING COMMENTS
Agree with above NP note.  patient known yo my office practice  history oh HTN, costello dysfx, mod AI admitted with increased dyspnea, hypoxic resp failure with recent work COVID exposure  hypoxic respiratory failure, r/o COVID  abx per medicine   component of volume overload, pulmonary edema  lasix IV x 1 given, cont PRN   f/u COVID testing  QTC prolonged  repeat ecg  vik likely secondary to hypoxia  renal f/u  ace on hold  recheck echo to eval lv fxn, AI  BP ok  cont current meds Agree with above NP note.  patient known yo my office practice  history oh HTN, costello dysfx, mod AI admitted with increased dyspnea, hypoxic resp failure with recent work COVID exposure  hypoxic respiratory failure, COVID + PNA  abx per medicine   some component of volume overload, pulmonary edema, acute on chronic diastolic HF  lasix IV x 1 given, cont PRN   f/u COVID testing  QTC prolonged  repeat ecg  vik likely secondary to hypoxia  renal f/u  ace on hold  recheck echo to eval lv fxn, AI  BP ok  cont current meds

## 2020-04-02 NOTE — PROGRESS NOTE ADULT - SUBJECTIVE AND OBJECTIVE BOX
Patient is a 65y old  Male who presents with a chief complaint of pneumonia, resp failure (02 Apr 2020 14:47)      SUBJECTIVE / OVERNIGHT EVENTS: No acute events overnight.  Patient is currently in prone position requiring 10 L on NBR     MEDICATIONS  (STANDING):  ALBUTerol    90 MICROgram(s) HFA Inhaler 2 Puff(s) Inhalation every 6 hours  amLODIPine   Tablet 10 milliGRAM(s) Oral daily  aspirin  chewable 81 milliGRAM(s) Oral daily  budesonide 160 MICROgram(s)/formoterol 4.5 MICROgram(s) Inhaler 2 Puff(s) Inhalation two times a day  cefTRIAXone   IVPB 1000 milliGRAM(s) IV Intermittent every 24 hours  cloNIDine 0.2 milliGRAM(s) Oral two times a day  dextrose 5%. 1000 milliLiter(s) (50 mL/Hr) IV Continuous <Continuous>  dextrose 50% Injectable 12.5 Gram(s) IV Push once  dextrose 50% Injectable 25 Gram(s) IV Push once  dextrose 50% Injectable 25 Gram(s) IV Push once  heparin  Injectable 5000 Unit(s) SubCutaneous every 8 hours  hydroxychloroquine   Oral   hydroxychloroquine   Oral   hydroxychloroquine 400 milliGRAM(s) Oral every 12 hours  insulin glargine Injectable (LANTUS) 60 Unit(s) SubCutaneous at bedtime  insulin lispro (HumaLOG) corrective regimen sliding scale   SubCutaneous three times a day before meals  insulin lispro (HumaLOG) corrective regimen sliding scale   SubCutaneous at bedtime  insulin lispro Injectable (HumaLOG) 8 Unit(s) SubCutaneous three times a day before meals  labetalol 300 milliGRAM(s) Oral two times a day  methylPREDNISolone sodium succinate Injectable 40 milliGRAM(s) IV Push two times a day  tiotropium 18 MICROgram(s) Capsule 1 Capsule(s) Inhalation daily    MEDICATIONS  (PRN):  dextrose 40% Gel 15 Gram(s) Oral once PRN Blood Glucose LESS THAN 70 milliGRAM(s)/deciliter  glucagon  Injectable 1 milliGRAM(s) IntraMuscular once PRN Glucose LESS THAN 70 milligrams/deciliter      T(C): 36.7 (04-02-20 @ 15:58), Max: 37.1 (04-01-20 @ 20:55)  HR: 97 (04-02-20 @ 15:58) (87 - 105)  BP: 134/87 (04-02-20 @ 15:58) (134/87 - 178/80)  RR: 22 (04-02-20 @ 15:58) (20 - 27)  SpO2: 92% (04-02-20 @ 15:58) (92% - 98%)  CAPILLARY BLOOD GLUCOSE      POCT Blood Glucose.: 314 mg/dL (02 Apr 2020 16:46)  POCT Blood Glucose.: 376 mg/dL (02 Apr 2020 11:51)  POCT Blood Glucose.: 428 mg/dL (02 Apr 2020 07:55)  POCT Blood Glucose.: 398 mg/dL (02 Apr 2020 04:59)  POCT Blood Glucose.: 327 mg/dL (02 Apr 2020 01:51)  POCT Blood Glucose.: 395 mg/dL (01 Apr 2020 18:46)    I&O's Summary    02 Apr 2020 07:01  -  02 Apr 2020 18:04  --------------------------------------------------------  IN: 120 mL / OUT: 200 mL / NET: -80 mL        PHYSICAL EXAM:  GENERAL: not in distress, on room air  EYES: open, sclera clear b/l  CHEST/LUNG: normal respiratory effort, not tachypneic, speaking in full sentences without difficulty  HEART: Not tachycardic, no lower extremity edema b/l  ABDOMEN: Soft, Nontender, Nondistended  EXTREMITIES: Warm and well perfused  NEUROLOGY: awake, alert, responds to Qs appropriately, no gross deficits  PSYCH: calm, cooperative  SKIN: No visible rashes or lesions    LABS:                        14.8   8.95  )-----------( 376      ( 02 Apr 2020 17:00 )             45.4     04-02    138  |  99  |  103<H>  ----------------------------<  403<H>  4.6   |  23  |  4.84<H>    Ca    9.1      02 Apr 2020 14:15  Phos  6.0     04-02  Mg     2.9     04-02    TPro  7.0  /  Alb  2.6<L>  /  TBili  0.2  /  DBili  x   /  AST  27  /  ALT  32  /  AlkPhos  93  04-02              RADIOLOGY & ADDITIONAL TESTS:

## 2020-04-02 NOTE — CONSULT NOTE ADULT - SUBJECTIVE AND OBJECTIVE BOX
CARDIOLOGY CONSULT - Dr. South         HPI:  66yo M with hx as mentioned below presents with shortness of breath x 1 day and close contact with COVID coworker. Patient denies fever, chills, N/V, diarrhea. Currently on NRB,  he denies cp, palps, orthopnea. reports compliance with diuretics.  Echo from  5/10/19 minimial MR, moderate bi-atrial enlargement, nl LV sys fx, ef 67%, mod diastolic dysfx stage 2. s/p IVP lasix 40 mgx 1  yesterday over night.   ros otherwise negative       PAST MEDICAL HISTORY:  Hypertension, Diabetes Mellitus, Aortic Regurgitation, Diastolic Heart Failure, CBD Stone, s/p Stent, PAD    PAST SURGICAL HISTORY:  Denies    SOCIAL HISTORY:  Cigarettes: Quit ; Alcohol: None; Caffeine: Coffee 1 or 2 times/week    FAMILY HISTORY:  Noncontributory    ALLERGIES: No Known Allergies    MEDICATIONS HOME:   DOXAZOSIN 2 MG................Si tablet QD for 90 Days Qty: 90, at night  LABETALOL 300 MG..............Si tablet 2 times per day for 90 Days Qty: 360  FUROSEMIDE 40 MG..............Si tablet QD for 90 Days Qty: 90  TOUJEO SOLOSTAR 300 UNITS/ML..Si solution QD for 30 Days Qty: 30, 60 units subcutaneous once a day at bedtime.  LISINOPRIL 40 MG..............Si tablet QD for 90 Days Qty: 90  VITAMIN B-12 100 MCG..........Si tablet QD for 30 Days Qty: 30  VITAMIN D2 50,000 INTL UNITS..Si capsule Every Week for 4 Weeks Qty: 4  ASPIRIN 81 MG.................Si delayed release tablet QD for 90 Days Qty: 90  LANTUS SOLOSTAR  UNITS/ML..Si solution QD for 90 Days Qty: 90  SYMBICORT 160 MCG-4.5 MCG/INH..Si PUFFS BID for 30 Days Qty: 1  SPIRIVA 18 MCG................Si PUFFS QD for 90 Days Qty: 1  AMLODIPINE 10 MG..............Si tablet QD for 90 Days Qty: 90  METFORMIN 1000 MG.............Si tablet BID for 90 Days Qty: 180  CLONIDINE 0.1 MG..............Si tablet BID for 45 Days Qty: 180      MEDICATIONS  (STANDING):  ALBUTerol    90 MICROgram(s) HFA Inhaler 2 Puff(s) Inhalation every 6 hours  amLODIPine   Tablet 10 milliGRAM(s) Oral daily  aspirin  chewable 81 milliGRAM(s) Oral daily  azithromycin   Tablet 500 milliGRAM(s) Oral daily  budesonide 160 MICROgram(s)/formoterol 4.5 MICROgram(s) Inhaler 2 Puff(s) Inhalation two times a day  cefTRIAXone   IVPB 1000 milliGRAM(s) IV Intermittent every 24 hours  cloNIDine 0.2 milliGRAM(s) Oral two times a day  dextrose 5%. 1000 milliLiter(s) (50 mL/Hr) IV Continuous <Continuous>  dextrose 50% Injectable 12.5 Gram(s) IV Push once  dextrose 50% Injectable 25 Gram(s) IV Push once  dextrose 50% Injectable 25 Gram(s) IV Push once  heparin  Injectable 5000 Unit(s) SubCutaneous every 8 hours  insulin glargine Injectable (LANTUS) 20 Unit(s) SubCutaneous once  insulin glargine Injectable (LANTUS) 50 Unit(s) SubCutaneous at bedtime  insulin lispro (HumaLOG) corrective regimen sliding scale   SubCutaneous three times a day before meals  insulin lispro (HumaLOG) corrective regimen sliding scale   SubCutaneous at bedtime  labetalol 300 milliGRAM(s) Oral two times a day  methylPREDNISolone sodium succinate Injectable 40 milliGRAM(s) IV Push two times a day  tiotropium 18 MICROgram(s) Capsule 1 Capsule(s) Inhalation daily    PREVIOUS DIAGNOSTIC TESTING:    ECHO  5/10/19 minimial MR, moderate bi-atrial enlargement, nl LV sys fx, ef 67%, mod diastolic dysfx stage 2  ECHO 2017: normal LV fxn, EF 65%, mod AI, ao rt 4.0 cm, costello dys  ECHO 2016: normal LV fxn, EF 60%, moderate AI, ao root 4.0, mild MR  CAROTID 2016: minimal b/l ICA disease  ECHO 2/4/15: normal LV function, EF 65%, mod AI, mod LVH, mild diastolic dysfunctio  	    REVIEW OF SYSTEMS:  CONSTITUTIONAL: No fever, weight loss, or fatigue  EYES: No eye pain, visual disturbances, or discharge  ENMT:  No difficulty hearing, tinnitus, vertigo; No sinus or throat pain  NECK: No pain or stiffness  RESPIRATORY:  see hpi   CARDIOVASCULAR: No chest pain, palpitations, passing out, dizziness, or leg swelling  GASTROINTESTINAL: No abdominal or epigastric pain. No nausea, vomiting, or hematemesis; No diarrhea or constipation. No melena or hematochezia.  GENITOURINARY: No dysuria, frequency, hematuria, or incontinence  NEUROLOGICAL: No headaches, memory loss, loss of strength, numbness, or tremors  SKIN: No itching, burning, rashes, or lesions   	    [x ] All others negative	  [ ] Unable to obtain    PHYSICAL EXAM:  T(C): 36.5 (20 @ 07:11), Max: 37.1 (20 @ 20:55)  HR: 104 (20 @ 07:11) (87 - 105)  BP: 149/89 (20 @ 07:11) (149/89 - 178/80)  RR: 27 (20 @ 07:11) (20 - 27)  SpO2: 92% (20 @ 07:11) (92% - 98%)  Wt(kg): --  I&O's Summary      Appearance: NAD, currently on NRB  	  Psychiatry: A & O x 3, Mood & affect appropriate  HEENT:   Normal oral mucosa, PERRL, EOMI	  Lymphatic: No lymphadenopathy  Cardiovascular: Normal S1 S2,RRR, No JVD, No murmurs  Respiratory: diminished    Gastrointestinal:  Soft, Non-tender, + BS	  Skin: No rashes, No ecchymoses, No cyanosis	  Neurologic: Non-focal  Extremities: trace +1 le edema     TELEMETRY: off tele 	    ECG:  	  RADIOLOGY:    < from: CT Chest No Cont (20 @ 11:17) >    FINDINGS:    LUNGS AND AIRWAYS: Patent central airways.  Evidence of bilateral predominantly groundglass opacity infiltrates with crazy paving. Differential includes but is not limited to atypical pneumonia. Bronchiectasis involving the lower lobes bilaterally, right more than left.    PLEURA: No pleural effusion.    MEDIASTINUM AND MARYA: Mild mediastinal lymphadenopathy, slightly progressed.    VESSELS: Coronary artery calcification. Stable enlarged main pulmonary artery measuring approximately 3.4 cm. Stable dilatation of the ascending thoracic aorta measuring 4.1 cm.    HEART: Mild cardiomegaly. No pericardial effusion.    CHEST WALL AND LOWER NECK: Left thyroid nodules with substernal extension, similar to the prior study. No tracheal compression.    VISUALIZED UPPER ABDOMEN: Prior cholecystectomy. Surgical clips related to the right hepatic lobe.    BONES: Within normal limits.    IMPRESSION:     Bilateral predominantly groundglass opacity infiltrates without associated pleural effusions. Differential includes but is not limited to atypical pneumonia. Correlate clinically.    Additional findings as above.        < end of copied text >    OTHER: 	  	  LABS:	 	    CARDIAC MARKERS:  Troponin T, High Sensitivity: 45 ng/L ( @ 06:04)  Troponin T, High Sensitivity: 56 ng/L ( @ 22:20)                                  15.0   11.34 )-----------( 330      ( 2020 06:04 )             45.5     04-02    136  |  98  |  96<H>  ----------------------------<  470<HH>  Test not performed   SPECIMEN MODERATELY HEMOLYZED   |  22  |  4.82<H>    Ca    9.0      2020 06:04  Phos  6.5     -  Mg     2.8         TPro  7.3  /  Alb  2.4<L>  /  TBili  0.3  /  DBili  x   /  AST  40  /  ALT  35  /  AlkPhos  97  04-02      proBNP: Serum Pro-Brain Natriuretic Peptide: 1906 pg/mL ( @ 22:20)    Lipid Profile:   HgA1c:   TSH:

## 2020-04-02 NOTE — ED ADULT NURSE REASSESSMENT NOTE - NS ED NURSE REASSESS COMMENT FT1
As per covering Rn, goal was patient O2 88%-92% on Nasal Cannula. Patient O2 dropped to 77% on Nasal cannula, paged Dr. Kevin Padilla. Patient placed on non-rebreather, O2 saturation 95%. will continue to monitor.

## 2020-04-02 NOTE — H&P ADULT - PROBLEM SELECTOR PLAN 1
- Empiric coverage with azithro. Will add hydroxychloroquine if diagnosis of COVID confirmed.  - Monitor resp status and pulse ox. C/w NRB.  - Check COVID pcr and cultures.  - Isolations.  - Tylenol prn for fever and pleuritic chest pain

## 2020-04-02 NOTE — H&P ADULT - HISTORY OF PRESENT ILLNESS
66yo M with HTN, T2DM on insulin, HLD, copd presents with shortness of breath x 1 day and close contact with COVID coworker. Patient denies fever, chills, N/V, diarrhea. Currently satting 74% on NRB, imrpoved with proning to 95% on NRB.    Per current hospital medicine emergency protocol, in an effort to reduce COVID exposures and also conserve PPE for necessary encounters, H&P below is obtained from chart review without direct patient contact unless acute changes.

## 2020-04-02 NOTE — PROGRESS NOTE ADULT - PROBLEM SELECTOR PLAN 1
- At this time given increasing oxygen requirement will starting patient on Plaquenil  ( Patient has mildly prolonged QT at 486, cleared with cardiology to trend the QTC)    - Monitor resp status and pulse ox. C/w NRB. Currently in prone position   - Isolations.  - Tylenol prn for fever and pleuritic chest pain.

## 2020-04-02 NOTE — H&P ADULT - PROBLEM SELECTOR PROBLEM 4
Type 2 diabetes mellitus with hyperglycemia, with long-term current use of insulin Other congestive heart failure

## 2020-04-02 NOTE — CONSULT NOTE ADULT - PROBLEM SELECTOR RECOMMENDATION 9
Pt. with likely hemodynamically mediated NANCY in the setting of COVID-19 infection, ACE-i, and diuretics. Lisette MUÑOZ reviewed, last outpatient Scr was 1.24 on 6/28/18. On arrival to the ER (4/1/20), Scr was 4.88 and repeat today was 4.2.  Pt. with likely ATN. US Kidneys negative for hydronephrosis. Continue to hold diuretics and ACE-i. Encourage PO intake. Recommend checking UA, urine electrolytes, and spot urine TP/CR. Check serum C3, C4, HepB sAg and HepC Ab. Monitor labs and urine output. Avoid potential nephrotoxins.    If any questions, please feel free to contact me  Mark Villagomez   Nephrology Fellow  [phone]  (After 5 pm or on weekends please page the on-call fellow)

## 2020-04-02 NOTE — H&P ADULT - ASSESSMENT
64yo M with HTN, T2DM on insulin, HLD, copd presents with shortness of breath x 1 day. Admitted for suspected COVID pneumonia vs. CHF exacerbation.

## 2020-04-02 NOTE — H&P ADULT - PROBLEM SELECTOR PLAN 2
- Patient with significant resp distress at the time of admission. Currently on NR mask to keep oxygenation. Goal SaO2 88% to improve ventilation drive.  - Trend VBG and monitor resp status. Low threshold to consult ICU and intubate patient.

## 2020-04-02 NOTE — PROGRESS NOTE ADULT - PROBLEM SELECTOR PLAN 3
-Creatinine at baseline from admission   -Holding off on fluid bolus given the initial overload state  -Can consider gentle bolus tomorrow

## 2020-04-03 LAB
ALBUMIN SERPL ELPH-MCNC: 2.4 G/DL — LOW (ref 3.3–5)
ALP SERPL-CCNC: 81 U/L — SIGNIFICANT CHANGE UP (ref 40–120)
ALT FLD-CCNC: 29 U/L — SIGNIFICANT CHANGE UP (ref 4–41)
ANION GAP SERPL CALC-SCNC: 17 MMO/L — HIGH (ref 7–14)
AST SERPL-CCNC: 26 U/L — SIGNIFICANT CHANGE UP (ref 4–40)
BILIRUB SERPL-MCNC: 0.2 MG/DL — SIGNIFICANT CHANGE UP (ref 0.2–1.2)
BUN SERPL-MCNC: 110 MG/DL — HIGH (ref 7–23)
CALCIUM SERPL-MCNC: 8.8 MG/DL — SIGNIFICANT CHANGE UP (ref 8.4–10.5)
CHLORIDE SERPL-SCNC: 98 MMOL/L — SIGNIFICANT CHANGE UP (ref 98–107)
CO2 SERPL-SCNC: 21 MMOL/L — LOW (ref 22–31)
CREAT SERPL-MCNC: 4.66 MG/DL — HIGH (ref 0.5–1.3)
GAMMA INTERFERON BACKGROUND BLD IA-ACNC: 0.01 IU/ML — SIGNIFICANT CHANGE UP
GLUCOSE BLDC GLUCOMTR-MCNC: 308 MG/DL — HIGH (ref 70–99)
GLUCOSE BLDC GLUCOMTR-MCNC: 340 MG/DL — HIGH (ref 70–99)
GLUCOSE BLDC GLUCOMTR-MCNC: 372 MG/DL — HIGH (ref 70–99)
GLUCOSE SERPL-MCNC: 378 MG/DL — HIGH (ref 70–99)
HCT VFR BLD CALC: 40.9 % — SIGNIFICANT CHANGE UP (ref 39–50)
HCV AB S/CO SERPL IA: 0.27 S/CO — SIGNIFICANT CHANGE UP (ref 0–0.99)
HCV AB SERPL-IMP: SIGNIFICANT CHANGE UP
HGB BLD-MCNC: 13.5 G/DL — SIGNIFICANT CHANGE UP (ref 13–17)
M TB IFN-G BLD-IMP: SIGNIFICANT CHANGE UP
M TB IFN-G CD4+ BCKGRND COR BLD-ACNC: 0 IU/ML — SIGNIFICANT CHANGE UP
M TB IFN-G CD4+CD8+ BCKGRND COR BLD-ACNC: 0 IU/ML — SIGNIFICANT CHANGE UP
MAGNESIUM SERPL-MCNC: 2.7 MG/DL — HIGH (ref 1.6–2.6)
MCHC RBC-ENTMCNC: 28.4 PG — SIGNIFICANT CHANGE UP (ref 27–34)
MCHC RBC-ENTMCNC: 33 % — SIGNIFICANT CHANGE UP (ref 32–36)
MCV RBC AUTO: 86.1 FL — SIGNIFICANT CHANGE UP (ref 80–100)
NRBC # FLD: 0.02 K/UL — SIGNIFICANT CHANGE UP (ref 0–0)
NT-PROBNP SERPL-SCNC: 821.3 PG/ML — SIGNIFICANT CHANGE UP
PHOSPHATE SERPL-MCNC: 5.3 MG/DL — HIGH (ref 2.5–4.5)
PLATELET # BLD AUTO: 363 K/UL — SIGNIFICANT CHANGE UP (ref 150–400)
PMV BLD: 10 FL — SIGNIFICANT CHANGE UP (ref 7–13)
POTASSIUM SERPL-MCNC: 4.2 MMOL/L — SIGNIFICANT CHANGE UP (ref 3.5–5.3)
POTASSIUM SERPL-SCNC: 4.2 MMOL/L — SIGNIFICANT CHANGE UP (ref 3.5–5.3)
PROT SERPL-MCNC: 6.3 G/DL — SIGNIFICANT CHANGE UP (ref 6–8.3)
QUANT TB PLUS MITOGEN MINUS NIL: 0.01 IU/ML — SIGNIFICANT CHANGE UP
RBC # BLD: 4.75 M/UL — SIGNIFICANT CHANGE UP (ref 4.2–5.8)
RBC # FLD: 13.7 % — SIGNIFICANT CHANGE UP (ref 10.3–14.5)
SODIUM SERPL-SCNC: 136 MMOL/L — SIGNIFICANT CHANGE UP (ref 135–145)
WBC # BLD: 13.98 K/UL — HIGH (ref 3.8–10.5)
WBC # FLD AUTO: 13.98 K/UL — HIGH (ref 3.8–10.5)

## 2020-04-03 PROCEDURE — 99232 SBSQ HOSP IP/OBS MODERATE 35: CPT | Mod: GC

## 2020-04-03 RX ORDER — INSULIN GLARGINE 100 [IU]/ML
68 INJECTION, SOLUTION SUBCUTANEOUS AT BEDTIME
Refills: 0 | Status: DISCONTINUED | OUTPATIENT
Start: 2020-04-03 | End: 2020-04-04

## 2020-04-03 RX ORDER — INSULIN LISPRO 100/ML
VIAL (ML) SUBCUTANEOUS
Refills: 0 | Status: DISCONTINUED | OUTPATIENT
Start: 2020-04-03 | End: 2020-04-08

## 2020-04-03 RX ORDER — INSULIN LISPRO 100/ML
12 VIAL (ML) SUBCUTANEOUS
Refills: 0 | Status: DISCONTINUED | OUTPATIENT
Start: 2020-04-03 | End: 2020-04-04

## 2020-04-03 RX ORDER — ANAKINRA 100MG/0.67
100 SYRINGE (ML) SUBCUTANEOUS
Refills: 0 | Status: DISCONTINUED | OUTPATIENT
Start: 2020-04-03 | End: 2020-04-03

## 2020-04-03 RX ORDER — ANAKINRA 100MG/0.67
100 SYRINGE (ML) SUBCUTANEOUS
Refills: 0 | Status: COMPLETED | OUTPATIENT
Start: 2020-04-03 | End: 2020-04-06

## 2020-04-03 RX ORDER — INSULIN LISPRO 100/ML
VIAL (ML) SUBCUTANEOUS AT BEDTIME
Refills: 0 | Status: DISCONTINUED | OUTPATIENT
Start: 2020-04-03 | End: 2020-04-08

## 2020-04-03 RX ADMIN — ALBUTEROL 2 PUFF(S): 90 AEROSOL, METERED ORAL at 06:03

## 2020-04-03 RX ADMIN — HEPARIN SODIUM 5000 UNIT(S): 5000 INJECTION INTRAVENOUS; SUBCUTANEOUS at 13:21

## 2020-04-03 RX ADMIN — Medication 8 UNIT(S): at 17:37

## 2020-04-03 RX ADMIN — Medication 40 MILLIGRAM(S): at 06:03

## 2020-04-03 RX ADMIN — TIOTROPIUM BROMIDE 1 CAPSULE(S): 18 CAPSULE ORAL; RESPIRATORY (INHALATION) at 10:49

## 2020-04-03 RX ADMIN — Medication 100 MILLIGRAM(S): at 23:09

## 2020-04-03 RX ADMIN — INSULIN GLARGINE 68 UNIT(S): 100 INJECTION, SOLUTION SUBCUTANEOUS at 22:51

## 2020-04-03 RX ADMIN — Medication 0.2 MILLIGRAM(S): at 17:38

## 2020-04-03 RX ADMIN — Medication 4: at 17:36

## 2020-04-03 RX ADMIN — BUDESONIDE AND FORMOTEROL FUMARATE DIHYDRATE 2 PUFF(S): 160; 4.5 AEROSOL RESPIRATORY (INHALATION) at 08:12

## 2020-04-03 RX ADMIN — ALBUTEROL 2 PUFF(S): 90 AEROSOL, METERED ORAL at 17:38

## 2020-04-03 RX ADMIN — BUDESONIDE AND FORMOTEROL FUMARATE DIHYDRATE 2 PUFF(S): 160; 4.5 AEROSOL RESPIRATORY (INHALATION) at 23:10

## 2020-04-03 RX ADMIN — Medication 81 MILLIGRAM(S): at 13:21

## 2020-04-03 RX ADMIN — CEFTRIAXONE 100 MILLIGRAM(S): 500 INJECTION, POWDER, FOR SOLUTION INTRAMUSCULAR; INTRAVENOUS at 06:02

## 2020-04-03 RX ADMIN — Medication 8 UNIT(S): at 08:12

## 2020-04-03 RX ADMIN — Medication 300 MILLIGRAM(S): at 17:38

## 2020-04-03 RX ADMIN — Medication 8 UNIT(S): at 11:43

## 2020-04-03 RX ADMIN — Medication 5: at 08:12

## 2020-04-03 RX ADMIN — ALBUTEROL 2 PUFF(S): 90 AEROSOL, METERED ORAL at 23:09

## 2020-04-03 RX ADMIN — ALBUTEROL 2 PUFF(S): 90 AEROSOL, METERED ORAL at 09:03

## 2020-04-03 RX ADMIN — Medication 0.2 MILLIGRAM(S): at 06:02

## 2020-04-03 RX ADMIN — Medication 200 MILLIGRAM(S): at 17:38

## 2020-04-03 RX ADMIN — HEPARIN SODIUM 5000 UNIT(S): 5000 INJECTION INTRAVENOUS; SUBCUTANEOUS at 23:09

## 2020-04-03 RX ADMIN — Medication 40 MILLIGRAM(S): at 17:38

## 2020-04-03 RX ADMIN — Medication 400 MILLIGRAM(S): at 06:01

## 2020-04-03 RX ADMIN — Medication 100 MILLIGRAM(S): at 16:30

## 2020-04-03 RX ADMIN — Medication 4: at 22:50

## 2020-04-03 RX ADMIN — AMLODIPINE BESYLATE 10 MILLIGRAM(S): 2.5 TABLET ORAL at 06:02

## 2020-04-03 RX ADMIN — Medication 5: at 11:43

## 2020-04-03 RX ADMIN — Medication 300 MILLIGRAM(S): at 06:03

## 2020-04-03 RX ADMIN — HEPARIN SODIUM 5000 UNIT(S): 5000 INJECTION INTRAVENOUS; SUBCUTANEOUS at 06:02

## 2020-04-03 NOTE — PROGRESS NOTE ADULT - ATTENDING COMMENTS
agree with above  pt well known to us  65 year old male with hx Hypertension, Diabetes Mellitus, Aortic Regurgitation, Diastolic Heart Failure, CBD Stone, s/p Stent, PAD  shortness of breath x 1 day. Admitted for suspected COVID pneumonia  appears euvolemic  no further lasix at this time  abx  QTc acceptable agree with above  pt well known to us  65 year old male with hx Hypertension, Diabetes Mellitus, Aortic Regurgitation, Diastolic Heart Failure, CBD Stone, s/p Stent, PAD  shortness of breath x 1 day.   COVID+  appears euvolemic  no further lasix at this time  abx  QTc acceptable  abx per medicine

## 2020-04-03 NOTE — PROGRESS NOTE ADULT - ASSESSMENT
ECHO  5/10/19 minimial MR, moderate bi-atrial enlargement, nl LV sys fx, ef 67%, mod diastolic dysfx stage 2    a/p  65 year old male with hx Hypertension, Diabetes Mellitus, Aortic Regurgitation, Diastolic Heart Failure, CBD Stone, s/p Stent, PAD  shortness of breath x 1 day. Admitted for suspected COVID pneumonia vs. CHF exacerbation.    1. SOB; + covid infection   Covid + 4/2   ct chest with bl predominatly grounglass opacity infiltrates without associated pleural effusions ? atypical pna   QT wnl, continue Plaquenil continue monitoring EKG   ID / med f/u   02 supplementation     2.  Chronic Diastolic CHF   s/p 40 mg IVP lasix x1; volume status stable  avoid further lasix for now given elevated creat, acute covid infection   monitor for fluid overloaded   c/w BB   repeat ECHO if feasible or bedside POCUS if pt deteriorates to eval LV function and AR     3. Aortic Regurgitation  cv stable     dvt ppx

## 2020-04-03 NOTE — PROGRESS NOTE ADULT - SUBJECTIVE AND OBJECTIVE BOX
[facility] DIVISION OF KIDNEY DISEASES AND HYPERTENSION -- FOLLOW UP NOTE  KALINA Fellow pager # [phone]  KALINA Attending phone # [phone]  Nephrology office # [phone]  --------------------------------------------------------------------------------  HPI:  66yo M with HTN, T2DM on insulin, HLD, copd presents with shortness of breath x 1 day and close contact with COVID coworker. Patient denies fever, chills, N/V, diarrhea. Currently satting 74% on NRB, imrpoved with proning to 95% on NRB.    24 hour events:  on 100 NRB, comfortable       PAST HISTORY  --------------------------------------------------------------------------------  No significant changes to PMH, PSH, FHx, SHx, unless otherwise noted    ALLERGIES & MEDICATIONS  --------------------------------------------------------------------------------  Allergies    No Known Allergies    Intolerances      Standing Inpatient Medications  ALBUTerol    90 MICROgram(s) HFA Inhaler 2 Puff(s) Inhalation every 6 hours  amLODIPine   Tablet 10 milliGRAM(s) Oral daily  aspirin  chewable 81 milliGRAM(s) Oral daily  budesonide 160 MICROgram(s)/formoterol 4.5 MICROgram(s) Inhaler 2 Puff(s) Inhalation two times a day  cefTRIAXone   IVPB 1000 milliGRAM(s) IV Intermittent every 24 hours  cloNIDine 0.2 milliGRAM(s) Oral two times a day  dextrose 5%. 1000 milliLiter(s) IV Continuous <Continuous>  dextrose 50% Injectable 12.5 Gram(s) IV Push once  dextrose 50% Injectable 25 Gram(s) IV Push once  dextrose 50% Injectable 25 Gram(s) IV Push once  heparin  Injectable 5000 Unit(s) SubCutaneous every 8 hours  hydroxychloroquine   Oral   hydroxychloroquine 200 milliGRAM(s) Oral every 12 hours  insulin glargine Injectable (LANTUS) 60 Unit(s) SubCutaneous at bedtime  insulin lispro (HumaLOG) corrective regimen sliding scale   SubCutaneous three times a day before meals  insulin lispro (HumaLOG) corrective regimen sliding scale   SubCutaneous at bedtime  insulin lispro Injectable (HumaLOG) 8 Unit(s) SubCutaneous three times a day before meals  labetalol 300 milliGRAM(s) Oral two times a day  methylPREDNISolone sodium succinate Injectable 40 milliGRAM(s) IV Push two times a day  tiotropium 18 MICROgram(s) Capsule 1 Capsule(s) Inhalation daily    PRN Inpatient Medications  dextrose 40% Gel 15 Gram(s) Oral once PRN  glucagon  Injectable 1 milliGRAM(s) IntraMuscular once PRN      REVIEW OF SYSTEMS  --------------------------------------------------------------------------------  Gen: no lethargy  Respiratory: No dyspnea  CV: No chest pain  GI: No abdominal pain  MSK: (+) lower back pain  Neuro: No dizziness  Heme: No bleeding        All other systems were reviewed and are negative, except as noted.    VITALS/PHYSICAL EXAM  --------------------------------------------------------------------------------  T(C): 36.2 (04-03-20 @ 11:54), Max: 36.7 (04-02-20 @ 15:58)  HR: 76 (04-03-20 @ 11:54) (76 - 97)  BP: 119/69 (04-03-20 @ 11:54) (119/69 - 136/85)  RR: 22 (04-03-20 @ 11:54) (21 - 22)  SpO2: 94% (04-03-20 @ 11:54) (91% - 94%)      04-02-20 @ 07:01  -  04-03-20 @ 07:00  --------------------------------------------------------  IN: 360 mL / OUT: 700 mL / NET: -340 mL      Physical Exam:  	Gen: NAD. NRB   	HEENT: MMM  	Pulm: CTA B/L  	CV: S1S2  	Abd: Soft, +BS   	Ext: No LE edema B/L  	Neuro: Awake  	Skin: Warm and dry  	    LABS/STUDIES  --------------------------------------------------------------------------------              13.5   13.98 >-----------<  363      [04-03-20 @ 08:50]              40.9     Hemoglobin: 13.5 g/dL (04-03-20 @ 08:50)  Hemoglobin: 14.8 g/dL (04-02-20 @ 17:00)    Platelet Count - Automated: 363 K/uL (04-03-20 @ 08:50)  Platelet Count - Automated: 376 K/uL (04-02-20 @ 17:00)    136  |  98  |  110  ----------------------------<  378      [04-03-20 @ 08:50]  4.2   |  21  |  4.66        Ca     8.8     [04-03-20 @ 08:50]      Mg     2.7     [04-03-20 @ 08:50]      Phos  5.3     [04-03-20 @ 08:50]    TPro  6.3  /  Alb  2.4  /  TBili  0.2  /  DBili  x   /  AST  26  /  ALT  29  /  AlkPhos  81  [04-03-20 @ 08:50]              [04-02-20 @ 17:00]    Creatinine Trend:  SCr 4.66 [04-03 @ 08:50]  SCr 4.85 [04-02 @ 17:00]  SCr 4.84 [04-02 @ 14:15]  SCr 4.82 [04-02 @ 06:04]  SCr 4.88 [04-01 @ 20:44]    Urinalysis - [04-02-20 @ 17:25]      Color YELLOW / Appearance Lt TURBID / SG 1.019 / pH 6.0      Gluc 500 / Ketone NEGATIVE  / Bili NEGATIVE / Urobili NORMAL       Blood MODERATE / Protein 600 / Leuk Est NEGATIVE / Nitrite NEGATIVE      RBC 3-5 / WBC 6-10 / Hyaline  / Gran  / Sq Epi FEW / Non Sq Epi  / Bacteria FEW    Urine Sodium < 20      [04-02-20 @ 17:25]  Urine Potassium 46.1      [04-02-20 @ 17:25]  Urine Chloride < 20      [04-02-20 @ 17:25]    Ferritin 821.8      [04-02-20 @ 17:00]  HbA1c 8.9      [04-02-20 @ 17:00]    HBsAg NEGATIVE      [04-02-20 @ 17:00]  HCV 0.27, Nonreactive Hepatitis C AB  S/CO Ratio                        Interpretation  < 1.00                                   Non-Reactive  1.00 - 4.99                         Weakly-Reactive  >= 5.00                                Reactive  Non-Reactive: Aperson with a non-reactive HCV antibody  result is considered uninfected.  No further action is  needed unless recent infection is suspected.  In these  cases, consider repeat testing later to detect  seroconversion..  Weakly-Reactive: HCV antibody test is abnormal, HCV RNA  Qualitative test will follow.  Reactive: HCV antibody test is abnormal, HCV RNA  Qualitative test will follow.  Note: HCV antibody testing is performed on the [employer] system.      [04-02-20 @ 17:00]    C3 Complement 187.5      [04-02-20 @ 17:00]  C4 Complement 59.1      [04-02-20 @ 17:00]

## 2020-04-03 NOTE — PROGRESS NOTE ADULT - ASSESSMENT
· Assessment		  64yo M with HTN, T2DM on insulin, HLD, copd presents with shortness of breath Admitted w/ COVID pneumonia      CoV id infection.  c/w plaquenil  add steroids/ anakinra as on nrb now   - Monitor resp status and pulse ox. C/w NRB.    - Isolation  - Tylenol prn for fever and pleuritic chest pain.       ·  Problem: COPD (chronic obstructive pulmonary disease).     ·  Problem: NANCY (acute kidney injury)  - Hold lisinopril.  - Check urine lytes and trend Cr.   renal eval noted      ·  Problem: Other congestive heart failure. Plan: - Unclear if HF contributing to current status of resp distress.  appears euvolemic        ·  Problem: Type 2 diabetes mellitus with hyperglycemia, with long-term current use of ins  ulin. Plan:- Resumed home dose lantus   - SSI ordered to cover FSG QID.   fsg elevated  endo eval      Problem/Plan - 6:  Problem: Essential hypertension. Plan: - Monitor BP. Resume home dose amlodipine, labetolol and clonidine.  - Hold lisinopril given NANCY.

## 2020-04-03 NOTE — PROGRESS NOTE ADULT - SUBJECTIVE AND OBJECTIVE BOX
CHIEF COMPLAINT:Patient is a 65y old  Male who presents with a chief complaint of pneumonia, resp failure (03 Apr 2020 12:50)    	        PAST MEDICAL & SURGICAL HISTORY:  Heart murmur: recent dx  Cholelithiasis  COPD (chronic obstructive pulmonary disease)  Diabetes: fs this am 120  Hypertension  S/P exploratory laparotomy: 23 years ago          REVIEW OF SYSTEMS:  CONSTITUTIONAL: No fever, weight loss, or fatigue  EYES: No eye pain, visual disturbances, or discharge  NECK: No pain or stiffness  RESPIRATORY: No cough, wheezing, chills or hemoptysis; No Shortness of Breath  CARDIOVASCULAR: No chest pain, palpitations, passing out, dizziness, or leg swelling  GASTROINTESTINAL: No abdominal or epigastric pain. No nausea, vomiting, or hematemesis; No diarrhea or constipation. No melena or hematochezia.  GENITOURINARY: No dysuria, frequency, hematuria, or incontinence  NEUROLOGICAL: No headaches, memory loss, loss of strength, numbness, or tremors  SKIN: No itching, burning, rashes, or lesions   LYMPH Nodes: No enlarged glands  ENDOCRINE: No heat or cold intolerance; No hair loss  MUSCULOSKELETAL: No joint pain or swelling; No muscle, back, or extremity pain    Medications:  MEDICATIONS  (STANDING):  ALBUTerol    90 MICROgram(s) HFA Inhaler 2 Puff(s) Inhalation every 6 hours  amLODIPine   Tablet 10 milliGRAM(s) Oral daily  anakinra Injectable 100 milliGRAM(s) SubCutaneous <User Schedule>  aspirin  chewable 81 milliGRAM(s) Oral daily  budesonide 160 MICROgram(s)/formoterol 4.5 MICROgram(s) Inhaler 2 Puff(s) Inhalation two times a day  cloNIDine 0.2 milliGRAM(s) Oral two times a day  dextrose 5%. 1000 milliLiter(s) (50 mL/Hr) IV Continuous <Continuous>  dextrose 50% Injectable 12.5 Gram(s) IV Push once  dextrose 50% Injectable 25 Gram(s) IV Push once  dextrose 50% Injectable 25 Gram(s) IV Push once  heparin  Injectable 5000 Unit(s) SubCutaneous every 8 hours  hydroxychloroquine   Oral   hydroxychloroquine 200 milliGRAM(s) Oral every 12 hours  insulin glargine Injectable (LANTUS) 60 Unit(s) SubCutaneous at bedtime  insulin lispro (HumaLOG) corrective regimen sliding scale   SubCutaneous three times a day before meals  insulin lispro (HumaLOG) corrective regimen sliding scale   SubCutaneous at bedtime  insulin lispro Injectable (HumaLOG) 8 Unit(s) SubCutaneous three times a day before meals  labetalol 300 milliGRAM(s) Oral two times a day  methylPREDNISolone sodium succinate Injectable 40 milliGRAM(s) IV Push two times a day  tiotropium 18 MICROgram(s) Capsule 1 Capsule(s) Inhalation daily    MEDICATIONS  (PRN):  dextrose 40% Gel 15 Gram(s) Oral once PRN Blood Glucose LESS THAN 70 milliGRAM(s)/deciliter  glucagon  Injectable 1 milliGRAM(s) IntraMuscular once PRN Glucose LESS THAN 70 milligrams/deciliter    	    PHYSICAL EXAM:  T(C): 36.2 (04-03-20 @ 11:54), Max: 36.7 (04-02-20 @ 22:15)  HR: 76 (04-03-20 @ 11:54) (76 - 80)  BP: 119/69 (04-03-20 @ 11:54) (119/69 - 136/85)  RR: 22 (04-03-20 @ 11:54) (21 - 22)  SpO2: 94% (04-03-20 @ 11:54) (91% - 94%)  Wt(kg): --  I&O's Summary    02 Apr 2020 07:01  -  03 Apr 2020 07:00  --------------------------------------------------------  IN: 360 mL / OUT: 700 mL / NET: -340 mL        Appearance: Normal	  HEENT:   Normal oral mucosa, PERRL, EOMI	  Lymphatic: No lymphadenopathy  Cardiovascular: Normal S1 S2, No JVD, No murmurs, No edema  Respiratory: Lungs clear to auscultation	  Psychiatry: A & O x 3, Mood & affect appropriate  Gastrointestinal:  Soft, Non-tender, + BS	  Skin: No rashes, No ecchymoses, No cyanosis	  Neurologic: Non-focal  Extremities: Normal range of motion, No clubbing, cyanosis or edema  Vascular: Peripheral pulses palpable 2+ bilaterally    TELEMETRY: 	    ECG:  	  RADIOLOGY:  OTHER: 	  	  LABS:	 	    CARDIAC MARKERS:                                13.5   13.98 )-----------( 363      ( 03 Apr 2020 08:50 )             40.9     04-03    136  |  98  |  110<H>  ----------------------------<  378<H>  4.2   |  21<L>  |  4.66<H>    Ca    8.8      03 Apr 2020 08:50  Phos  5.3     04-03  Mg     2.7     04-03    TPro  6.3  /  Alb  2.4<L>  /  TBili  0.2  /  DBili  x   /  AST  26  /  ALT  29  /  AlkPhos  81  04-03    proBNP: Serum Pro-Brain Natriuretic Peptide: 821.3 pg/mL (04-03 @ 08:50)    Lipid Profile:   HgA1c: Hemoglobin A1C, Whole Blood: 8.9 % (04-02 @ 17:00)    TSH: CHIEF COMPLAINT:Patient is a 65y old  Male who presents with a chief complaint of pneumonia, resp failure (03 Apr 2020 12:50)    	        PAST MEDICAL & SURGICAL HISTORY:  Heart murmur: recent dx  Cholelithiasis  COPD (chronic obstructive pulmonary disease)  Diabetes: fs this am 120  Hypertension  S/P exploratory laparotomy: 23 years ago          REVIEW OF SYSTEMS:  weak  sob/ cough   CARDIOVASCULAR: No chest pain, palpitations, passing out, dizziness, or leg swelling  GASTROINTESTINAL: No abdominal or epigastric pain. No nausea, vomiting, or hematemesis; No diarrhea or constipation. No melena or hematochezia.  GENITOURINARY: No dysuria, frequency, hematuria, or incontinence  NEUROLOGICAL: No headaches, memory loss,    Medications:  MEDICATIONS  (STANDING):  ALBUTerol    90 MICROgram(s) HFA Inhaler 2 Puff(s) Inhalation every 6 hours  amLODIPine   Tablet 10 milliGRAM(s) Oral daily  anakinra Injectable 100 milliGRAM(s) SubCutaneous <User Schedule>  aspirin  chewable 81 milliGRAM(s) Oral daily  budesonide 160 MICROgram(s)/formoterol 4.5 MICROgram(s) Inhaler 2 Puff(s) Inhalation two times a day  cloNIDine 0.2 milliGRAM(s) Oral two times a day  dextrose 5%. 1000 milliLiter(s) (50 mL/Hr) IV Continuous <Continuous>  dextrose 50% Injectable 12.5 Gram(s) IV Push once  dextrose 50% Injectable 25 Gram(s) IV Push once  dextrose 50% Injectable 25 Gram(s) IV Push once  heparin  Injectable 5000 Unit(s) SubCutaneous every 8 hours  hydroxychloroquine   Oral   hydroxychloroquine 200 milliGRAM(s) Oral every 12 hours  insulin glargine Injectable (LANTUS) 60 Unit(s) SubCutaneous at bedtime  insulin lispro (HumaLOG) corrective regimen sliding scale   SubCutaneous three times a day before meals  insulin lispro (HumaLOG) corrective regimen sliding scale   SubCutaneous at bedtime  insulin lispro Injectable (HumaLOG) 8 Unit(s) SubCutaneous three times a day before meals  labetalol 300 milliGRAM(s) Oral two times a day  methylPREDNISolone sodium succinate Injectable 40 milliGRAM(s) IV Push two times a day  tiotropium 18 MICROgram(s) Capsule 1 Capsule(s) Inhalation daily    MEDICATIONS  (PRN):  dextrose 40% Gel 15 Gram(s) Oral once PRN Blood Glucose LESS THAN 70 milliGRAM(s)/deciliter  glucagon  Injectable 1 milliGRAM(s) IntraMuscular once PRN Glucose LESS THAN 70 milligrams/deciliter    	    PHYSICAL EXAM:  T(C): 36.2 (04-03-20 @ 11:54), Max: 36.7 (04-02-20 @ 22:15)  HR: 76 (04-03-20 @ 11:54) (76 - 80)  BP: 119/69 (04-03-20 @ 11:54) (119/69 - 136/85)  RR: 22 (04-03-20 @ 11:54) (21 - 22)  SpO2: 94% (04-03-20 @ 11:54) (91% - 94%)  Wt(kg): --  I&O's Summary    02 Apr 2020 07:01  -  03 Apr 2020 07:00  --------------------------------------------------------  IN: 360 mL / OUT: 700 mL / NET: -340 mL        Appearance: Normal	  HEENT:   Normal oral mucosa, PERRL, EOMI	  Lymphatic: No lymphadenopathy  Cardiovascular: Normal S1 S2, No JVD, No murmurs, No edema  Respiratory:dec bs 	  Psychiatry: A & O x 3,  Gastrointestinal:  Soft, Non-tender, + BS	  Skin: No rashes, No ecchymoses, No cyanosis	  Neurologic: Non-focal  Extremities: Normal range of motion, No clubbing, cyanosis or edema  Vascular: Peripheral pulses palpable 2+ bilaterally    TELEMETRY: 	    ECG:  	  RADIOLOGY:  OTHER: 	  	  LABS:	 	    CARDIAC MARKERS:                                13.5   13.98 )-----------( 363      ( 03 Apr 2020 08:50 )             40.9     04-03    136  |  98  |  110<H>  ----------------------------<  378<H>  4.2   |  21<L>  |  4.66<H>    Ca    8.8      03 Apr 2020 08:50  Phos  5.3     04-03  Mg     2.7     04-03    TPro  6.3  /  Alb  2.4<L>  /  TBili  0.2  /  DBili  x   /  AST  26  /  ALT  29  /  AlkPhos  81  04-03    proBNP: Serum Pro-Brain Natriuretic Peptide: 821.3 pg/mL (04-03 @ 08:50)    Lipid Profile:   HgA1c: Hemoglobin A1C, Whole Blood: 8.9 % (04-02 @ 17:00)    TSH:

## 2020-04-03 NOTE — PROGRESS NOTE ADULT - SUBJECTIVE AND OBJECTIVE BOX
CARDIOLOGY FOLLOW UP - Dr. South    CC pt. sitting up, awake, alert   on non-rebreather resting comfortrably  no acute events overnight, +COVID      PHYSICAL EXAM:  T(C): 36.2 (04-03-20 @ 11:54), Max: 36.7 (04-02-20 @ 15:58)  HR: 76 (04-03-20 @ 11:54) (76 - 97)  BP: 119/69 (04-03-20 @ 11:54) (119/69 - 136/85)  RR: 22 (04-03-20 @ 11:54) (21 - 22)  SpO2: 94% (04-03-20 @ 11:54) (91% - 94%)  Wt(kg): --  I&O's Summary    02 Apr 2020 07:01  -  03 Apr 2020 07:00  --------------------------------------------------------  IN: 360 mL / OUT: 700 mL / NET: -340 mL        Appearance: Normal	  Cardiovascular: Normal S1 S2,RRR, No JVD, No murmurs  Respiratory: Lungs clear to auscultation	  Gastrointestinal:  Soft, Non-tender, + BS	  Extremities: Normal range of motion, No clubbing, cyanosis or edema        MEDICATIONS  (STANDING):  ALBUTerol    90 MICROgram(s) HFA Inhaler 2 Puff(s) Inhalation every 6 hours  amLODIPine   Tablet 10 milliGRAM(s) Oral daily  aspirin  chewable 81 milliGRAM(s) Oral daily  budesonide 160 MICROgram(s)/formoterol 4.5 MICROgram(s) Inhaler 2 Puff(s) Inhalation two times a day  cefTRIAXone   IVPB 1000 milliGRAM(s) IV Intermittent every 24 hours  cloNIDine 0.2 milliGRAM(s) Oral two times a day  dextrose 5%. 1000 milliLiter(s) (50 mL/Hr) IV Continuous <Continuous>  dextrose 50% Injectable 12.5 Gram(s) IV Push once  dextrose 50% Injectable 25 Gram(s) IV Push once  dextrose 50% Injectable 25 Gram(s) IV Push once  heparin  Injectable 5000 Unit(s) SubCutaneous every 8 hours  hydroxychloroquine   Oral   hydroxychloroquine 200 milliGRAM(s) Oral every 12 hours  insulin glargine Injectable (LANTUS) 60 Unit(s) SubCutaneous at bedtime  insulin lispro (HumaLOG) corrective regimen sliding scale   SubCutaneous three times a day before meals  insulin lispro (HumaLOG) corrective regimen sliding scale   SubCutaneous at bedtime  insulin lispro Injectable (HumaLOG) 8 Unit(s) SubCutaneous three times a day before meals  labetalol 300 milliGRAM(s) Oral two times a day  methylPREDNISolone sodium succinate Injectable 40 milliGRAM(s) IV Push two times a day  tiotropium 18 MICROgram(s) Capsule 1 Capsule(s) Inhalation daily      TELEMETRY: 	    ECG:  	  RADIOLOGY:   DIAGNOSTIC TESTING:  [ ] Echocardiogram:  [ ]  Catheterization:  [ ] Stress Test:    OTHER: 	    LABS:	 	                                13.5   13.98 )-----------( 363      ( 03 Apr 2020 08:50 )             40.9     04-03    136  |  98  |  110<H>  ----------------------------<  378<H>  4.2   |  21<L>  |  4.66<H>    Ca    8.8      03 Apr 2020 08:50  Phos  5.3     04-03  Mg     2.7     04-03    TPro  6.3  /  Alb  2.4<L>  /  TBili  0.2  /  DBili  x   /  AST  26  /  ALT  29  /  AlkPhos  81  04-03

## 2020-04-03 NOTE — CHART NOTE - NSCHARTNOTEFT_GEN_A_CORE
64yo M with HTN, T2DM on insulin, HLD, copd presents with shortness of breath x 1 day. Admitted for suspected COVID pneumonia vs. CHF exacerbation. Endocrine consulted for diabetes mellitus with severe exacerbation due to steroid dosing.       MEDICATIONS  (STANDING):  ALBUTerol    90 MICROgram(s) HFA Inhaler 2 Puff(s) Inhalation every 6 hours  amLODIPine   Tablet 10 milliGRAM(s) Oral daily  anakinra Injectable 100 milliGRAM(s) SubCutaneous <User Schedule>  aspirin  chewable 81 milliGRAM(s) Oral daily  budesonide 160 MICROgram(s)/formoterol 4.5 MICROgram(s) Inhaler 2 Puff(s) Inhalation two times a day  cloNIDine 0.2 milliGRAM(s) Oral two times a day  dextrose 5%. 1000 milliLiter(s) (50 mL/Hr) IV Continuous <Continuous>  dextrose 50% Injectable 12.5 Gram(s) IV Push once  dextrose 50% Injectable 25 Gram(s) IV Push once  dextrose 50% Injectable 25 Gram(s) IV Push once  heparin  Injectable 5000 Unit(s) SubCutaneous every 8 hours  hydroxychloroquine   Oral   hydroxychloroquine 200 milliGRAM(s) Oral every 12 hours  insulin glargine Injectable (LANTUS) 68 Unit(s) SubCutaneous at bedtime  insulin lispro (HumaLOG) corrective regimen sliding scale   SubCutaneous three times a day before meals  insulin lispro (HumaLOG) corrective regimen sliding scale   SubCutaneous at bedtime  insulin lispro Injectable (HumaLOG) 12 Unit(s) SubCutaneous three times a day before meals  labetalol 300 milliGRAM(s) Oral two times a day  methylPREDNISolone sodium succinate Injectable 40 milliGRAM(s) IV Push two times a day  tiotropium 18 MICROgram(s) Capsule 1 Capsule(s) Inhalation daily    #1 Diabetes.    Plan: -Increase Lantus to 68 units at bedtime ( home dose)  -Increase Humalog to 12 units TID with meals   -Moderate correctional scale qac/hs.   -check glucose qachs.     DC on basal bolus likely    Please anticipate that when steroid is off, will need to reduce insulin dosing back to lower dosing. Can call endocrine team or reduce med to home Lantus 60 units and titrate meal time insulin.

## 2020-04-03 NOTE — PROGRESS NOTE ADULT - ASSESSMENT
Patient with NANCY in the setting of COVID infection, ACE-i use, and diuretics.     Problem/Recommendation - 1:  Problem: NANCY (acute kidney injury). Recommendation: Pt. with likely hemodynamically mediated NANCY in the setting of COVID-19 infection, ACE-i, and diuretics.   Lisette MUÑOZ reviewed, last outpatient Scr was 1.24 on 6/28/18. On arrival to the ER (4/1/20), Scr down to 4.66      Pt. with likely ATN. US Kidneys negative for hydronephrosis.   Continue to hold diuretics and ACE-i.   Monitor labs and urine output.   Avoid potential nephrotoxins.  no need for diuretics for now but can use Bumex 2mg IVP if  needed

## 2020-04-04 LAB
ALBUMIN SERPL ELPH-MCNC: 2.8 G/DL — LOW (ref 3.3–5)
ALP SERPL-CCNC: 77 U/L — SIGNIFICANT CHANGE UP (ref 40–120)
ALT FLD-CCNC: 30 U/L — SIGNIFICANT CHANGE UP (ref 4–41)
ANION GAP SERPL CALC-SCNC: 16 MMO/L — HIGH (ref 7–14)
AST SERPL-CCNC: 20 U/L — SIGNIFICANT CHANGE UP (ref 4–40)
BILIRUB SERPL-MCNC: 0.2 MG/DL — SIGNIFICANT CHANGE UP (ref 0.2–1.2)
BUN SERPL-MCNC: 126 MG/DL — HIGH (ref 7–23)
CALCIUM SERPL-MCNC: 9 MG/DL — SIGNIFICANT CHANGE UP (ref 8.4–10.5)
CHLORIDE SERPL-SCNC: 96 MMOL/L — LOW (ref 98–107)
CO2 SERPL-SCNC: 24 MMOL/L — SIGNIFICANT CHANGE UP (ref 22–31)
CREAT SERPL-MCNC: 4.97 MG/DL — HIGH (ref 0.5–1.3)
CRP SERPL-MCNC: 28.2 MG/L — HIGH
D DIMER BLD IA.RAPID-MCNC: 393 NG/ML — SIGNIFICANT CHANGE UP
GLUCOSE BLDC GLUCOMTR-MCNC: 125 MG/DL — HIGH (ref 70–99)
GLUCOSE BLDC GLUCOMTR-MCNC: 238 MG/DL — HIGH (ref 70–99)
GLUCOSE BLDC GLUCOMTR-MCNC: 276 MG/DL — HIGH (ref 70–99)
GLUCOSE BLDC GLUCOMTR-MCNC: 307 MG/DL — HIGH (ref 70–99)
GLUCOSE SERPL-MCNC: 319 MG/DL — HIGH (ref 70–99)
HCT VFR BLD CALC: 41.7 % — SIGNIFICANT CHANGE UP (ref 39–50)
HGB BLD-MCNC: 13.3 G/DL — SIGNIFICANT CHANGE UP (ref 13–17)
MAGNESIUM SERPL-MCNC: 3 MG/DL — HIGH (ref 1.6–2.6)
MCHC RBC-ENTMCNC: 27.9 PG — SIGNIFICANT CHANGE UP (ref 27–34)
MCHC RBC-ENTMCNC: 31.9 % — LOW (ref 32–36)
MCV RBC AUTO: 87.6 FL — SIGNIFICANT CHANGE UP (ref 80–100)
NRBC # FLD: 0.02 K/UL — SIGNIFICANT CHANGE UP (ref 0–0)
PHOSPHATE SERPL-MCNC: 7.3 MG/DL — HIGH (ref 2.5–4.5)
PLATELET # BLD AUTO: 395 K/UL — SIGNIFICANT CHANGE UP (ref 150–400)
PMV BLD: 9.9 FL — SIGNIFICANT CHANGE UP (ref 7–13)
POTASSIUM SERPL-MCNC: 4.5 MMOL/L — SIGNIFICANT CHANGE UP (ref 3.5–5.3)
POTASSIUM SERPL-SCNC: 4.5 MMOL/L — SIGNIFICANT CHANGE UP (ref 3.5–5.3)
PROT SERPL-MCNC: 6.3 G/DL — SIGNIFICANT CHANGE UP (ref 6–8.3)
RBC # BLD: 4.76 M/UL — SIGNIFICANT CHANGE UP (ref 4.2–5.8)
RBC # FLD: 13.7 % — SIGNIFICANT CHANGE UP (ref 10.3–14.5)
SODIUM SERPL-SCNC: 136 MMOL/L — SIGNIFICANT CHANGE UP (ref 135–145)
WBC # BLD: 16.4 K/UL — HIGH (ref 3.8–10.5)
WBC # FLD AUTO: 16.4 K/UL — HIGH (ref 3.8–10.5)

## 2020-04-04 RX ORDER — INSULIN GLARGINE 100 [IU]/ML
72 INJECTION, SOLUTION SUBCUTANEOUS AT BEDTIME
Refills: 0 | Status: DISCONTINUED | OUTPATIENT
Start: 2020-04-04 | End: 2020-04-07

## 2020-04-04 RX ORDER — INSULIN LISPRO 100/ML
16 VIAL (ML) SUBCUTANEOUS
Refills: 0 | Status: DISCONTINUED | OUTPATIENT
Start: 2020-04-04 | End: 2020-04-07

## 2020-04-04 RX ADMIN — Medication 12 UNIT(S): at 08:50

## 2020-04-04 RX ADMIN — Medication 200 MILLIGRAM(S): at 06:21

## 2020-04-04 RX ADMIN — Medication 100 MILLIGRAM(S): at 14:40

## 2020-04-04 RX ADMIN — Medication 6: at 08:50

## 2020-04-04 RX ADMIN — Medication 4: at 17:45

## 2020-04-04 RX ADMIN — Medication 8: at 13:26

## 2020-04-04 RX ADMIN — ALBUTEROL 2 PUFF(S): 90 AEROSOL, METERED ORAL at 06:21

## 2020-04-04 RX ADMIN — Medication 200 MILLIGRAM(S): at 17:44

## 2020-04-04 RX ADMIN — HEPARIN SODIUM 5000 UNIT(S): 5000 INJECTION INTRAVENOUS; SUBCUTANEOUS at 14:39

## 2020-04-04 RX ADMIN — ALBUTEROL 2 PUFF(S): 90 AEROSOL, METERED ORAL at 09:29

## 2020-04-04 RX ADMIN — Medication 81 MILLIGRAM(S): at 17:44

## 2020-04-04 RX ADMIN — TIOTROPIUM BROMIDE 1 CAPSULE(S): 18 CAPSULE ORAL; RESPIRATORY (INHALATION) at 09:29

## 2020-04-04 RX ADMIN — HEPARIN SODIUM 5000 UNIT(S): 5000 INJECTION INTRAVENOUS; SUBCUTANEOUS at 06:21

## 2020-04-04 RX ADMIN — Medication 100 MILLIGRAM(S): at 23:16

## 2020-04-04 RX ADMIN — Medication 40 MILLIGRAM(S): at 06:21

## 2020-04-04 RX ADMIN — BUDESONIDE AND FORMOTEROL FUMARATE DIHYDRATE 2 PUFF(S): 160; 4.5 AEROSOL RESPIRATORY (INHALATION) at 09:29

## 2020-04-04 RX ADMIN — Medication 300 MILLIGRAM(S): at 17:45

## 2020-04-04 RX ADMIN — ALBUTEROL 2 PUFF(S): 90 AEROSOL, METERED ORAL at 15:47

## 2020-04-04 RX ADMIN — Medication 100 MILLIGRAM(S): at 09:28

## 2020-04-04 RX ADMIN — Medication 16 UNIT(S): at 17:45

## 2020-04-04 RX ADMIN — Medication 300 MILLIGRAM(S): at 06:21

## 2020-04-04 RX ADMIN — Medication 0.2 MILLIGRAM(S): at 17:44

## 2020-04-04 RX ADMIN — AMLODIPINE BESYLATE 10 MILLIGRAM(S): 2.5 TABLET ORAL at 06:20

## 2020-04-04 RX ADMIN — INSULIN GLARGINE 72 UNIT(S): 100 INJECTION, SOLUTION SUBCUTANEOUS at 23:24

## 2020-04-04 RX ADMIN — Medication 12 UNIT(S): at 13:27

## 2020-04-04 RX ADMIN — Medication 100 MILLIGRAM(S): at 06:20

## 2020-04-04 RX ADMIN — Medication 40 MILLIGRAM(S): at 17:45

## 2020-04-04 RX ADMIN — Medication 0.2 MILLIGRAM(S): at 06:21

## 2020-04-04 RX ADMIN — HEPARIN SODIUM 5000 UNIT(S): 5000 INJECTION INTRAVENOUS; SUBCUTANEOUS at 23:24

## 2020-04-04 NOTE — CONSULT NOTE ADULT - SUBJECTIVE AND OBJECTIVE BOX
Patient is a 65y old  Male who presents with a chief complaint of pneumonia, resp failure (2020 14:04)      HPI:  66yo M with HTN, T2DM on insulin, HLD, copd presents with shortness of breath x 1 day and close contact with COVID coworker. Patient denies fever, chills, N/V, diarrhea. Currently satting 74% on NRB, imrpoved with proning to 95% on NRB.    Per current hospital medicine emergency protocol, in an effort to reduce COVID exposures and also conserve PPE for necessary encounters, H&P below is obtained from chart review without direct patient contact unless acute changes. (2020 01:36)     he says he was taking some inhalers at home which were recently stopped buy his PMD few months ago: he has cough and sob   ?FOLLOWING PRESENT  [X ] Hx of PE/DVT, [Y ] Hx COPD, [ X] Hx of Asthma, [ Y] Hx of Hospitalization, [ X]  Hx of BiPAP/CPAP use, X[ ] Hx of LING    Allergies    No Known Allergies    Intolerances        PAST MEDICAL & SURGICAL HISTORY:  Heart murmur: recent dx  Cholelithiasis  COPD (chronic obstructive pulmonary disease)  Diabetes: fs this am 120  Hypertension  S/P exploratory laparotomy: 23 years ago      FAMILY HISTORY:  No pertinent family history in first degree relatives      Social History: [ 35 YEARS:  quit many yearsa go  ] TOBACCO                  [ x ] ETOH                                 [  x] IVDA/DRUGS    REVIEW OF SYSTEMS      General:	x    Skin/Breast:x  	  Ophthalmologic:x  	  ENMT:	x    Respiratory and Thorax: fever, cough , RODRIGUEZ   	  Cardiovascular:	x    Gastrointestinal:	x    Genitourinary:	x  x  Musculoskeletal:	    Neurological:	x    Psychiatric:	x    Hematology/Lymphatics:	x    Endocrine:	x    Allergic/Immunologic:	x    MEDICATIONS  (STANDING):  ALBUTerol    90 MICROgram(s) HFA Inhaler 2 Puff(s) Inhalation every 6 hours  amLODIPine   Tablet 10 milliGRAM(s) Oral daily  anakinra Injectable 100 milliGRAM(s) SubCutaneous <User Schedule>  aspirin  chewable 81 milliGRAM(s) Oral daily  budesonide 160 MICROgram(s)/formoterol 4.5 MICROgram(s) Inhaler 2 Puff(s) Inhalation two times a day  cloNIDine 0.2 milliGRAM(s) Oral two times a day  dextrose 5%. 1000 milliLiter(s) (50 mL/Hr) IV Continuous <Continuous>  dextrose 50% Injectable 12.5 Gram(s) IV Push once  dextrose 50% Injectable 25 Gram(s) IV Push once  dextrose 50% Injectable 25 Gram(s) IV Push once  heparin  Injectable 5000 Unit(s) SubCutaneous every 8 hours  hydroxychloroquine   Oral   hydroxychloroquine 200 milliGRAM(s) Oral every 12 hours  insulin glargine Injectable (LANTUS) 72 Unit(s) SubCutaneous at bedtime  insulin lispro (HumaLOG) corrective regimen sliding scale   SubCutaneous three times a day before meals  insulin lispro (HumaLOG) corrective regimen sliding scale   SubCutaneous at bedtime  insulin lispro Injectable (HumaLOG) 16 Unit(s) SubCutaneous three times a day with meals  labetalol 300 milliGRAM(s) Oral two times a day  methylPREDNISolone sodium succinate Injectable 40 milliGRAM(s) IV Push two times a day  tiotropium 18 MICROgram(s) Capsule 1 Capsule(s) Inhalation daily    MEDICATIONS  (PRN):  dextrose 40% Gel 15 Gram(s) Oral once PRN Blood Glucose LESS THAN 70 milliGRAM(s)/deciliter  glucagon  Injectable 1 milliGRAM(s) IntraMuscular once PRN Glucose LESS THAN 70 milligrams/deciliter       Vital Signs Last 24 Hrs  T(C): 36.3 (2020 12:01), Max: 36.7 (2020 19:26)  T(F): 97.4 (2020 12:01), Max: 98 (2020 19:26)  HR: 65 (2020 12:01) (65 - 92)  BP: 124/79 (2020 12:01) (115/65 - 124/79)  BP(mean): --  RR: 20 (2020 12:01) (20 - 21)  SpO2: 92% (2020 12:01) (92% - 95%)        I&O's Summary      Physical Exam:   GENERAL: NAD, well-groomed, well-developed  HEENT: PABLITO/   Atraumatic, Normocephalic  ENMT: No tonsillar erythema, exudates, or enlargement; Moist mucous membranes, Good dentition, No lesions  NECK: Supple, No JVD, Normal thyroid  CHEST/LUNG: Clear to auscultation bilaterally; No rales, rhonchi, wheezing, or rubs  CVS: Regular rate and rhythm; No murmurs, rubs, or gallops  GI: : Soft, Nontender, Nondistended; Bowel sounds present  NERVOUS SYSTEM:  Alert & Oriented X3  EXTREMITIES:  2+ Peripheral Pulses, No clubbing, cyanosis, or edema  LYMPH: No lymphadenopathy noted  SKIN: No rashes or lesions  ENDOCRINOLOGY: No Thyromegaly  PSYCH: Appropriate    Labs:  -1.1<61<4>>56<<7.245>>-1.1<<3><<4><<5<<569>>, 2.0<73<4>>43<<7.225>>2.0<<3><<4><<5<<439>>, 1.6<59<4>>26<<7.295>>1.6<<3><<4><<5<<269>>                            13.3   16.40 )-----------( 395      ( 2020 05:57 )             41.7                         13.5   13.98 )-----------( 363      ( 2020 08:50 )             40.9                         14.8   8.95  )-----------( 376      ( 2020 17:00 )             45.4                         15.0   11.34 )-----------( 330      ( 2020 06:04 )             45.5                         14.6   12.20 )-----------( 323      ( 2020 20:44 )             45.5     04    136  |  96<L>  |  126<H>  ----------------------------<  319<H>  4.5   |  24  |  4.97<H>  04    136  |  98  |  110<H>  ----------------------------<  378<H>  4.2   |  21<L>  |  4.66<H>  04    141  |  99  |  105<H>  ----------------------------<  367<H>  4.8   |  21<L>  |  4.85<H>      138  |  99  |  103<H>  ----------------------------<  403<H>  4.6   |  23  |  4.84<H>      136  |  98  |  96<H>  ----------------------------<  470<HH>  Test not performed   SPECIMEN MODERATELY HEMOLYZED   |  22  |  4.82<H>      135  |  96<L>  |  89<H>  ----------------------------<  375<H>  4.5   |  21<L>  |  4.88<H>    Ca    9.0      2020 05:57  Ca    8.8      2020 08:50  Ca    8.6      2020 17:00  Phos  7.3     04-04  Phos  5.3     04-03  Mg     3.0     04-04  Mg     2.7     04-03    TPro  6.3  /  Alb  2.8<L>  /  TBili  0.2  /  DBili  x   /  AST  20  /  ALT  30  /  AlkPhos  77  04-04  TPro  6.3  /  Alb  2.4<L>  /  TBili  0.2  /  DBili  x   /  AST  26  /  ALT  29  /  AlkPhos  81  04-03  TPro  6.1  /  Alb  2.8<L>  /  TBili  0.2  /  DBili  x   /  AST  28  /  ALT  31  /  AlkPhos  101  04-02  TPro  7.0  /  Alb  2.6<L>  /  TBili  0.2  /  DBili  x   /  AST  27  /  ALT  32  /  AlkPhos  93  04-02  TPro  7.3  /  Alb  2.4<L>  /  TBili  0.3  /  DBili  x   /  AST  40  /  ALT  35  /  AlkPhos  97  04-02  TPro  7.2  /  Alb  2.5<L>  /  TBili  0.3  /  DBili  x   /  AST  47<H>  /  ALT  32  /  AlkPhos  96  04-01    CAPILLARY BLOOD GLUCOSE      POCT Blood Glucose.: 307 mg/dL (2020 12:33)  POCT Blood Glucose.: 276 mg/dL (2020 08:32)  POCT Blood Glucose.: 308 mg/dL (2020 21:52)  POCT Blood Glucose.: 340 mg/dL (2020 17:34)    LIVER FUNCTIONS - ( 2020 05:57 )  Alb: 2.8 g/dL / Pro: 6.3 g/dL / ALK PHOS: 77 u/L / ALT: 30 u/L / AST: 20 u/L / GGT: x             Urinalysis Basic - ( 2020 17:25 )    Color: YELLOW / Appearance: Lt TURBID / S.019 / pH: 6.0  Gluc: 500 / Ketone: NEGATIVE  / Bili: NEGATIVE / Urobili: NORMAL   Blood: MODERATE / Protein: 600 / Nitrite: NEGATIVE   Leuk Esterase: NEGATIVE / RBC: 3-5 / WBC 6-10   Sq Epi: FEW / Non Sq Epi: x / Bacteria: FEW      D DImer  Serum Pro-Brain Natriuretic Peptide: 821.3 pg/mL ( @ 08:50)  Serum Pro-Brain Natriuretic Peptide: 1906 pg/mL ( @ 22:20)      Studies  Chest X-RAY  CT SCAN Chest   CT Abdomen  Venous Dopplers: LE:   Others      < from: Xray Chest 1 View- PORTABLE-Urgent (20 @ 21:38) >    EXAM:  XR CHEST PORTABLE URGENT 1V        PROCEDURE DATE:  2020         INTERPRETATION:  EXAMINATION: XR CHEST URGENT    CLINICAL INDICATION: Shortness of breath. Evaluate for pneumonia.    TECHNIQUE: Single frontal view of the chest was obtained.    COMPARISON: Chest x-ray 2017.    FINDINGS:     Monitor leads overlie and partially obscure the chest. A portion the right apex is not included on this study. Moderate diffuse bilateral patchy airspace opacities. No pneumothorax. No pleural effusion. Heart size cannot be accurately evaluated on this bedside examination of the chest but the heart does not appear grossly enlarged. Degenerative changes of the spine. No acute osseous abnormalities. Surgical clips overlie the right upper quadrant.    IMPRESSION:   Moderate diffuse bilateral patchy infiltrates              ARLENE DOSHI M.D., RADIOLGY RESIDENT  This document has been electronically signed.  JOSSELIN COELHO M.D., ATTENDING RADIOLOGIST  This document has been electronically signed. 2020  8:16AM        < end of copied text >

## 2020-04-04 NOTE — CHART NOTE - NSCHARTNOTEFT_GEN_A_CORE
Contact info:   Kirk Cassidy MD  pager [phone], please provide 10 digit call back number.   Please note that this patient may be followed by another provider tomorrow.   If no answer or after hours, please contact [phone]    Per current hospital medicine emergency protocol, in an effort to reduce COVID exposures and also conserve PPE for necessary encounters, H&P below is obtained from chart review and nursing without direct patient contact unless acute changes.      MEDICATIONS  (STANDING):  ALBUTerol    90 MICROgram(s) HFA Inhaler 2 Puff(s) Inhalation every 6 hours  amLODIPine   Tablet 10 milliGRAM(s) Oral daily  anakinra Injectable 100 milliGRAM(s) SubCutaneous <User Schedule>  aspirin  chewable 81 milliGRAM(s) Oral daily  budesonide 160 MICROgram(s)/formoterol 4.5 MICROgram(s) Inhaler 2 Puff(s) Inhalation two times a day  cloNIDine 0.2 milliGRAM(s) Oral two times a day  dextrose 5%. 1000 milliLiter(s) (50 mL/Hr) IV Continuous <Continuous>  dextrose 50% Injectable 12.5 Gram(s) IV Push once  dextrose 50% Injectable 25 Gram(s) IV Push once  dextrose 50% Injectable 25 Gram(s) IV Push once  heparin  Injectable 5000 Unit(s) SubCutaneous every 8 hours  hydroxychloroquine   Oral   hydroxychloroquine 200 milliGRAM(s) Oral every 12 hours  insulin glargine Injectable (LANTUS) 72 Unit(s) SubCutaneous at bedtime  insulin lispro (HumaLOG) corrective regimen sliding scale   SubCutaneous three times a day before meals  insulin lispro (HumaLOG) corrective regimen sliding scale   SubCutaneous at bedtime  insulin lispro Injectable (HumaLOG) 16 Unit(s) SubCutaneous three times a day with meals  labetalol 300 milliGRAM(s) Oral two times a day  methylPREDNISolone sodium succinate Injectable 40 milliGRAM(s) IV Push two times a day  tiotropium 18 MICROgram(s) Capsule 1 Capsule(s) Inhalation daily    MEDICATIONS  (PRN):  dextrose 40% Gel 15 Gram(s) Oral once PRN Blood Glucose LESS THAN 70 milliGRAM(s)/deciliter  glucagon  Injectable 1 milliGRAM(s) IntraMuscular once PRN Glucose LESS THAN 70 milligrams/deciliter      PHYSICAL EXAM:  VITALS: T(C): 36.3 (04-04-20 @ 12:01)  T(F): 97.4 (04-04-20 @ 12:01), Max: 98 (04-03-20 @ 19:26)  HR: 65 (04-04-20 @ 12:01) (65 - 92)  BP: 124/79 (04-04-20 @ 12:01) (115/65 - 124/79)  RR:  (20 - 21)  SpO2:  (92% - 95%)  Wt(kg): --    POCT Blood Glucose.: 307 mg/dL (04-04-20 @ 12:33)  POCT Blood Glucose.: 276 mg/dL (04-04-20 @ 08:32)  POCT Blood Glucose.: 308 mg/dL (04-03-20 @ 21:52)  POCT Blood Glucose.: 340 mg/dL (04-03-20 @ 17:34)  POCT Blood Glucose.: 372 mg/dL (04-03-20 @ 11:18)  POCT Blood Glucose.: 368 mg/dL (04-03-20 @ 07:54)  POCT Blood Glucose.: 259 mg/dL (04-02-20 @ 21:11)  POCT Blood Glucose.: 314 mg/dL (04-02-20 @ 16:46)  POCT Blood Glucose.: 376 mg/dL (04-02-20 @ 11:51)  POCT Blood Glucose.: 428 mg/dL (04-02-20 @ 07:55)  POCT Blood Glucose.: 398 mg/dL (04-02-20 @ 04:59)  POCT Blood Glucose.: 327 mg/dL (04-02-20 @ 01:51)  POCT Blood Glucose.: 395 mg/dL (04-01-20 @ 18:46)      04-04    136  |  96<L>  |  126<H>  ----------------------------<  319<H>  4.5   |  24  |  4.97<H>    EGFR if : 13  EGFR if non : 11    Ca    9.0      04-04  Mg     3.0     04-04  Phos  7.3     04-04    TPro  6.3  /  Alb  2.8<L>  /  TBili  0.2  /  DBili  x   /  AST  20  /  ALT  30  /  AlkPhos  77  04-04        Thyroid Function Tests:      Hemoglobin A1C, Whole Blood: 8.9 % <H> [4.0 - 5.6] (04-02-20 @ 17:00)  Hemoglobin A1C, Whole Blood: 8.7 % <H> [4.0 - 5.6] (04-02-20 @ 14:15)    64yo M with HTN, T2DM on insulin, HLD, copd presents with shortness of breath x 1 day. Admitted for suspected COVID pneumonia vs. CHF exacerbation. Endocrine consulted for diabetes mellitus with severe exacerbation due to steroid dosing.     #1 Diabetes.    Still with persistent hyperglycemia, increase Lantus to 72 units at bedtime ( home dose)  -Increase Humalog to 16 units TID with meals   -Moderate correctional scale qac/hs.   -check glucose qachs.     Kirk Cassidy MD  Please page [phone] with 10 digit call back number if any questions.    Temporary Work Cell ( via google voice) [phone]  For after hours or weekends please call [phone] or page fellow on call.

## 2020-04-04 NOTE — CONSULT NOTE ADULT - ASSESSMENT
66yo M with HTN, T2DM on insulin, HLD, copd presents with shortness of breath x 1 day. Admitted for suspected COVID pneumonia vs. CHF exacerbation.

## 2020-04-04 NOTE — PROGRESS NOTE ADULT - SUBJECTIVE AND OBJECTIVE BOX
CHIEF COMPLAINT:Patient is a 65y old  Male who presents with a chief complaint of pneumonia, resp failure (04 Apr 2020 09:51)    	        PAST MEDICAL & SURGICAL HISTORY:  Heart murmur: recent dx  Cholelithiasis  COPD (chronic obstructive pulmonary disease)  Diabetes: fs this am 120  Hypertension  S/P exploratory laparotomy: 23 years ago          REVIEW OF SYSTEMS:  weak  RESPIRATORY:  cough / sob   CARDIOVASCULAR: No chest pain, palpitations, passing out, dizziness,  GASTROINTESTINAL: No abdominal or epigastric pain. No nausea, vomiting, or hematemesis; No diarrhea or constipation.  GENITOURINARY: No dysuria, frequency, hematuria, or incontinence  NEUROLOGICAL: No headaches,     Medications:  MEDICATIONS  (STANDING):  ALBUTerol    90 MICROgram(s) HFA Inhaler 2 Puff(s) Inhalation every 6 hours  amLODIPine   Tablet 10 milliGRAM(s) Oral daily  anakinra Injectable 100 milliGRAM(s) SubCutaneous <User Schedule>  aspirin  chewable 81 milliGRAM(s) Oral daily  budesonide 160 MICROgram(s)/formoterol 4.5 MICROgram(s) Inhaler 2 Puff(s) Inhalation two times a day  cloNIDine 0.2 milliGRAM(s) Oral two times a day  dextrose 5%. 1000 milliLiter(s) (50 mL/Hr) IV Continuous <Continuous>  dextrose 50% Injectable 12.5 Gram(s) IV Push once  dextrose 50% Injectable 25 Gram(s) IV Push once  dextrose 50% Injectable 25 Gram(s) IV Push once  heparin  Injectable 5000 Unit(s) SubCutaneous every 8 hours  hydroxychloroquine   Oral   hydroxychloroquine 200 milliGRAM(s) Oral every 12 hours  insulin glargine Injectable (LANTUS) 72 Unit(s) SubCutaneous at bedtime  insulin lispro (HumaLOG) corrective regimen sliding scale   SubCutaneous three times a day before meals  insulin lispro (HumaLOG) corrective regimen sliding scale   SubCutaneous at bedtime  insulin lispro Injectable (HumaLOG) 16 Unit(s) SubCutaneous three times a day with meals  labetalol 300 milliGRAM(s) Oral two times a day  methylPREDNISolone sodium succinate Injectable 40 milliGRAM(s) IV Push two times a day  tiotropium 18 MICROgram(s) Capsule 1 Capsule(s) Inhalation daily    MEDICATIONS  (PRN):  dextrose 40% Gel 15 Gram(s) Oral once PRN Blood Glucose LESS THAN 70 milliGRAM(s)/deciliter  glucagon  Injectable 1 milliGRAM(s) IntraMuscular once PRN Glucose LESS THAN 70 milligrams/deciliter    	    PHYSICAL EXAM:  T(C): 36.3 (04-04-20 @ 12:01), Max: 36.7 (04-03-20 @ 19:26)  HR: 65 (04-04-20 @ 12:01) (65 - 92)  BP: 124/79 (04-04-20 @ 12:01) (115/65 - 124/79)  RR: 20 (04-04-20 @ 12:01) (20 - 21)  SpO2: 92% (04-04-20 @ 12:01) (92% - 95%)  Wt(kg): --  I&O's Summary      Appearance: Normal	  HEENT:   Normal oral mucosa, PERRL, EOMI	  Lymphatic: No lymphadenopathy  Cardiovascular: Normal S1 S2, No JVD, No murmurs, No edema  Respiratory: dec bs   Psychiatry: A & O  Gastrointestinal:  Soft, Non-tender, + BS	  Neurologic: Non-focal  Extremities: Normal range of motion, No clubbing, cyanosis or edema  Vascular: Peripheral pulses palpable 2+ bilaterally    TELEMETRY: 	    ECG:  	  RADIOLOGY:  OTHER: 	  	  LABS:	 	    CARDIAC MARKERS:                                13.3   16.40 )-----------( 395      ( 04 Apr 2020 05:57 )             41.7     04-04    136  |  96<L>  |  126<H>  ----------------------------<  319<H>  4.5   |  24  |  4.97<H>    Ca    9.0      04 Apr 2020 05:57  Phos  7.3     04-04  Mg     3.0     04-04    TPro  6.3  /  Alb  2.8<L>  /  TBili  0.2  /  DBili  x   /  AST  20  /  ALT  30  /  AlkPhos  77  04-04    proBNP:   Lipid Profile:   HgA1c:   TSH:

## 2020-04-04 NOTE — PROGRESS NOTE ADULT - ASSESSMENT
· Assessment		  64yo M with HTN, T2DM on insulin, HLD, copd presents with shortness of breath Admitted w/ COVID pneumonia      CoV id infection.  c/w plaquenil  added steroids/ anakinra as on nrb now /pul f/u noted  - Monitor resp status and pulse ox. C/w NRB.    - Isolation  - Tylenol prn for fever and pleuritic chest pain.       ·  Problem: COPD (chronic obstructive pulmonary disease).     ·  Problem: NANCY (acute kidney injury)  - Hold lisinopril.  renal  to f/u       ·  Problem: Other congestive heart failure. Plan: - Unclear if HF contributing to current status of resp distress.  appears euvolemic        ·  Problem: Type 2 diabetes mellitus with hyperglycemia, with long-term current use of insulin  fsg elevated  endo eval noted  insulin adjusted      Problem: Essential hypertension. c/w meds as per cards  - Hold lisinopril given NANCY.

## 2020-04-04 NOTE — PROGRESS NOTE ADULT - SUBJECTIVE AND OBJECTIVE BOX
CC: no events, O2 sat/resp status stable on NRB    TELEMETRY:     PHYSICAL EXAM:    T(C): 36.3 (04-04-20 @ 06:07), Max: 36.7 (04-03-20 @ 19:26)  HR: 66 (04-04-20 @ 06:07) (66 - 92)  BP: 115/65 (04-04-20 @ 06:07) (115/65 - 120/67)  RR: 21 (04-04-20 @ 06:07) (21 - 22)  SpO2: 93% (04-04-20 @ 06:07) (93% - 95%)  Wt(kg): --  I&O's Summary      Appearance: Normal	  Cardiovascular: Normal S1 S2,RRR, No JVD, No murmurs  Respiratory: Lungs clear to auscultation	  Gastrointestinal:  Soft, Non-tender, + BS	  Extremities: Normal range of motion, No clubbing, cyanosis or edema  Vascular: Peripheral pulses palpable 2+ bilaterally     LABS:	 	                          13.3   16.40 )-----------( 395      ( 04 Apr 2020 05:57 )             41.7     04-04    136  |  96<L>  |  126<H>  ----------------------------<  319<H>  4.5   |  24  |  4.97<H>    Ca    9.0      04 Apr 2020 05:57  Phos  7.3     04-04  Mg     3.0     04-04    TPro  6.3  /  Alb  2.8<L>  /  TBili  0.2  /  DBili  x   /  AST  20  /  ALT  30  /  AlkPhos  77  04-04          CARDIAC MARKERS:

## 2020-04-04 NOTE — CONSULT NOTE ADULT - PROBLEM SELECTOR RECOMMENDATION 9
seems COVID pneumonia: but on reasonable amount of oxygen: has copd too and bronchiectasis: on Bd: pt is already on anakinra as well as steroids and oxygen: check inflammatory markers:

## 2020-04-05 LAB
ALBUMIN SERPL ELPH-MCNC: 2.6 G/DL — LOW (ref 3.3–5)
ALP SERPL-CCNC: 71 U/L — SIGNIFICANT CHANGE UP (ref 40–120)
ALT FLD-CCNC: 31 U/L — SIGNIFICANT CHANGE UP (ref 4–41)
ANION GAP SERPL CALC-SCNC: 18 MMO/L — HIGH (ref 7–14)
AST SERPL-CCNC: 22 U/L — SIGNIFICANT CHANGE UP (ref 4–40)
BILIRUB SERPL-MCNC: 0.2 MG/DL — SIGNIFICANT CHANGE UP (ref 0.2–1.2)
BUN SERPL-MCNC: 129 MG/DL — HIGH (ref 7–23)
CALCIUM SERPL-MCNC: 8.8 MG/DL — SIGNIFICANT CHANGE UP (ref 8.4–10.5)
CHLORIDE SERPL-SCNC: 98 MMOL/L — SIGNIFICANT CHANGE UP (ref 98–107)
CO2 SERPL-SCNC: 21 MMOL/L — LOW (ref 22–31)
CREAT SERPL-MCNC: 4.37 MG/DL — HIGH (ref 0.5–1.3)
CRP SERPL-MCNC: 17.8 MG/L — HIGH
D DIMER BLD IA.RAPID-MCNC: 486 NG/ML — SIGNIFICANT CHANGE UP
FERRITIN SERPL-MCNC: 533.2 NG/ML — HIGH (ref 30–400)
GLUCOSE BLDC GLUCOMTR-MCNC: 159 MG/DL — HIGH (ref 70–99)
GLUCOSE BLDC GLUCOMTR-MCNC: 186 MG/DL — HIGH (ref 70–99)
GLUCOSE BLDC GLUCOMTR-MCNC: 206 MG/DL — HIGH (ref 70–99)
GLUCOSE BLDC GLUCOMTR-MCNC: 213 MG/DL — HIGH (ref 70–99)
GLUCOSE SERPL-MCNC: 210 MG/DL — HIGH (ref 70–99)
HCT VFR BLD CALC: 40.2 % — SIGNIFICANT CHANGE UP (ref 39–50)
HGB BLD-MCNC: 13.5 G/DL — SIGNIFICANT CHANGE UP (ref 13–17)
MAGNESIUM SERPL-MCNC: 2.9 MG/DL — HIGH (ref 1.6–2.6)
MCHC RBC-ENTMCNC: 28.7 PG — SIGNIFICANT CHANGE UP (ref 27–34)
MCHC RBC-ENTMCNC: 33.6 % — SIGNIFICANT CHANGE UP (ref 32–36)
MCV RBC AUTO: 85.5 FL — SIGNIFICANT CHANGE UP (ref 80–100)
NRBC # FLD: 0 K/UL — SIGNIFICANT CHANGE UP (ref 0–0)
PLATELET # BLD AUTO: 408 K/UL — HIGH (ref 150–400)
PMV BLD: 10.2 FL — SIGNIFICANT CHANGE UP (ref 7–13)
POTASSIUM SERPL-MCNC: 4 MMOL/L — SIGNIFICANT CHANGE UP (ref 3.5–5.3)
POTASSIUM SERPL-SCNC: 4 MMOL/L — SIGNIFICANT CHANGE UP (ref 3.5–5.3)
PROT SERPL-MCNC: 6 G/DL — SIGNIFICANT CHANGE UP (ref 6–8.3)
RBC # BLD: 4.7 M/UL — SIGNIFICANT CHANGE UP (ref 4.2–5.8)
RBC # FLD: 13.4 % — SIGNIFICANT CHANGE UP (ref 10.3–14.5)
SODIUM SERPL-SCNC: 137 MMOL/L — SIGNIFICANT CHANGE UP (ref 135–145)
WBC # BLD: 14.01 K/UL — HIGH (ref 3.8–10.5)
WBC # FLD AUTO: 14.01 K/UL — HIGH (ref 3.8–10.5)

## 2020-04-05 RX ADMIN — BUDESONIDE AND FORMOTEROL FUMARATE DIHYDRATE 2 PUFF(S): 160; 4.5 AEROSOL RESPIRATORY (INHALATION) at 10:05

## 2020-04-05 RX ADMIN — INSULIN GLARGINE 72 UNIT(S): 100 INJECTION, SOLUTION SUBCUTANEOUS at 22:17

## 2020-04-05 RX ADMIN — Medication 40 MILLIGRAM(S): at 04:15

## 2020-04-05 RX ADMIN — Medication 100 MILLIGRAM(S): at 17:17

## 2020-04-05 RX ADMIN — Medication 300 MILLIGRAM(S): at 17:18

## 2020-04-05 RX ADMIN — ALBUTEROL 2 PUFF(S): 90 AEROSOL, METERED ORAL at 17:23

## 2020-04-05 RX ADMIN — Medication 100 MILLIGRAM(S): at 10:05

## 2020-04-05 RX ADMIN — AMLODIPINE BESYLATE 10 MILLIGRAM(S): 2.5 TABLET ORAL at 10:05

## 2020-04-05 RX ADMIN — Medication 4: at 12:26

## 2020-04-05 RX ADMIN — HEPARIN SODIUM 5000 UNIT(S): 5000 INJECTION INTRAVENOUS; SUBCUTANEOUS at 04:15

## 2020-04-05 RX ADMIN — Medication 81 MILLIGRAM(S): at 17:17

## 2020-04-05 RX ADMIN — Medication 200 MILLIGRAM(S): at 17:18

## 2020-04-05 RX ADMIN — Medication 0.2 MILLIGRAM(S): at 17:17

## 2020-04-05 RX ADMIN — ALBUTEROL 2 PUFF(S): 90 AEROSOL, METERED ORAL at 10:05

## 2020-04-05 RX ADMIN — ALBUTEROL 2 PUFF(S): 90 AEROSOL, METERED ORAL at 04:00

## 2020-04-05 RX ADMIN — Medication 100 MILLIGRAM(S): at 22:43

## 2020-04-05 RX ADMIN — Medication 16 UNIT(S): at 17:16

## 2020-04-05 RX ADMIN — BUDESONIDE AND FORMOTEROL FUMARATE DIHYDRATE 2 PUFF(S): 160; 4.5 AEROSOL RESPIRATORY (INHALATION) at 22:19

## 2020-04-05 RX ADMIN — Medication 16 UNIT(S): at 12:26

## 2020-04-05 RX ADMIN — Medication 2: at 17:16

## 2020-04-05 RX ADMIN — Medication 16 UNIT(S): at 08:36

## 2020-04-05 RX ADMIN — ALBUTEROL 2 PUFF(S): 90 AEROSOL, METERED ORAL at 22:19

## 2020-04-05 RX ADMIN — Medication 0.2 MILLIGRAM(S): at 04:15

## 2020-04-05 RX ADMIN — TIOTROPIUM BROMIDE 1 CAPSULE(S): 18 CAPSULE ORAL; RESPIRATORY (INHALATION) at 10:06

## 2020-04-05 RX ADMIN — Medication 40 MILLIGRAM(S): at 17:18

## 2020-04-05 RX ADMIN — Medication 100 MILLIGRAM(S): at 04:00

## 2020-04-05 RX ADMIN — Medication 300 MILLIGRAM(S): at 10:05

## 2020-04-05 RX ADMIN — HEPARIN SODIUM 5000 UNIT(S): 5000 INJECTION INTRAVENOUS; SUBCUTANEOUS at 17:18

## 2020-04-05 RX ADMIN — Medication 4: at 08:35

## 2020-04-05 RX ADMIN — Medication 200 MILLIGRAM(S): at 04:14

## 2020-04-05 NOTE — PROGRESS NOTE ADULT - ASSESSMENT
66yo M with HTN, T2DM on insulin, HLD, copd presents with shortness of breath x 1 day. Admitted for suspected COVID pneumonia vs. CHF exacerbation.    COVID PNEUMONIA    d4 of plaquenil  d3 of anakinra  day 3 of solumedrol:  on 3 L of nasal cannula    crp is decreasing:   follow up d d crow as wellas ferritin tomorrow: he has high ferritin:  LFTS normal     DM    monitor and control per PMD as on high dose steoird s  HTN  CHF   NANCY

## 2020-04-05 NOTE — PROGRESS NOTE ADULT - SUBJECTIVE AND OBJECTIVE BOX
CHIEF COMPLAINT:Patient is a 65y old  Male who presents with a chief complaint of pneumonia, resp failure (05 Apr 2020 13:44)    	        PAST MEDICAL & SURGICAL HISTORY:  Heart murmur: recent dx  Cholelithiasis  COPD (chronic obstructive pulmonary disease)  Diabetes: fs this am 120  Hypertension  S/P exploratory laparotomy: 23 years ago          REVIEW OF SYSTEMS:  feels better  dec sob/cough    CARDIOVASCULAR: No chest pain, palpitations, passing out, dizziness, or leg swelling  GASTROINTESTINAL: No abdominal or epigastric pain. No nausea, vomiting, or hematemesis; No diarrhea or constipation. No melena or hematochezia.  GENITOURINARY: No dysuria, frequency, hematuria, or incontinence  NEUROLOGICAL: No headaches, memory loss    Medications:  MEDICATIONS  (STANDING):  ALBUTerol    90 MICROgram(s) HFA Inhaler 2 Puff(s) Inhalation every 6 hours  amLODIPine   Tablet 10 milliGRAM(s) Oral daily  anakinra Injectable 100 milliGRAM(s) SubCutaneous <User Schedule>  aspirin  chewable 81 milliGRAM(s) Oral daily  budesonide 160 MICROgram(s)/formoterol 4.5 MICROgram(s) Inhaler 2 Puff(s) Inhalation two times a day  cloNIDine 0.2 milliGRAM(s) Oral two times a day  dextrose 5%. 1000 milliLiter(s) (50 mL/Hr) IV Continuous <Continuous>  dextrose 50% Injectable 12.5 Gram(s) IV Push once  dextrose 50% Injectable 25 Gram(s) IV Push once  dextrose 50% Injectable 25 Gram(s) IV Push once  heparin  Injectable 5000 Unit(s) SubCutaneous every 8 hours  hydroxychloroquine   Oral   hydroxychloroquine 200 milliGRAM(s) Oral every 12 hours  insulin glargine Injectable (LANTUS) 72 Unit(s) SubCutaneous at bedtime  insulin lispro (HumaLOG) corrective regimen sliding scale   SubCutaneous three times a day before meals  insulin lispro (HumaLOG) corrective regimen sliding scale   SubCutaneous at bedtime  insulin lispro Injectable (HumaLOG) 16 Unit(s) SubCutaneous three times a day with meals  labetalol 300 milliGRAM(s) Oral two times a day  methylPREDNISolone sodium succinate Injectable 40 milliGRAM(s) IV Push two times a day  tiotropium 18 MICROgram(s) Capsule 1 Capsule(s) Inhalation daily    MEDICATIONS  (PRN):  dextrose 40% Gel 15 Gram(s) Oral once PRN Blood Glucose LESS THAN 70 milliGRAM(s)/deciliter  glucagon  Injectable 1 milliGRAM(s) IntraMuscular once PRN Glucose LESS THAN 70 milligrams/deciliter    	    PHYSICAL EXAM:  T(C): 36.7 (04-05-20 @ 10:10), Max: 36.7 (04-05-20 @ 10:10)  HR: 95 (04-05-20 @ 10:10) (63 - 95)  BP: 122/64 (04-05-20 @ 10:10) (118/68 - 127/81)  RR: 18 (04-05-20 @ 10:10) (18 - 20)  SpO2: 96% (04-05-20 @ 10:10) (95% - 100%)  Wt(kg): --  I&O's Summary    04 Apr 2020 07:01  -  05 Apr 2020 07:00  --------------------------------------------------------  IN: 0 mL / OUT: 1200 mL / NET: -1200 mL        Appearance: Normal	  HEENT:   Normal oral mucosa, PERRL, EOMI	  Lymphatic: No lymphadenopathy  Cardiovascular: Normal S1 S2, No JVD, No murmurs, No edema  Respiratory: dec bs   Gastrointestinal:  Soft, Non-tender, + BS	  Skin: No rashes, No ecchymoses, No cyanosis	  Neurologic: Non-focal  Extremities: Normal range of motion, No clubbing, cyanosis or edema  Vascular: Peripheral pulses palpable 2+ bilaterally    TELEMETRY: 	    ECG:  	  RADIOLOGY:  OTHER: 	  	  LABS:	 	    CARDIAC MARKERS:                                13.5   14.01 )-----------( 408      ( 05 Apr 2020 06:10 )             40.2     04-05    137  |  98  |  129<H>  ----------------------------<  210<H>  4.0   |  21<L>  |  4.37<H>    Ca    8.8      05 Apr 2020 06:10  Phos  7.3     04-04  Mg     2.9     04-05    TPro  6.0  /  Alb  2.6<L>  /  TBili  0.2  /  DBili  x   /  AST  22  /  ALT  31  /  AlkPhos  71  04-05    proBNP:   Lipid Profile:   HgA1c:   TSH:

## 2020-04-05 NOTE — PROGRESS NOTE ADULT - SUBJECTIVE AND OBJECTIVE BOX
Patient is a 65y old  Male who presents with a chief complaint of pneumonia, resp failure (05 Apr 2020 13:54)      Any change in ROS:     MEDICATIONS  (STANDING):  ALBUTerol    90 MICROgram(s) HFA Inhaler 2 Puff(s) Inhalation every 6 hours  amLODIPine   Tablet 10 milliGRAM(s) Oral daily  anakinra Injectable 100 milliGRAM(s) SubCutaneous <User Schedule>  aspirin  chewable 81 milliGRAM(s) Oral daily  budesonide 160 MICROgram(s)/formoterol 4.5 MICROgram(s) Inhaler 2 Puff(s) Inhalation two times a day  cloNIDine 0.2 milliGRAM(s) Oral two times a day  dextrose 5%. 1000 milliLiter(s) (50 mL/Hr) IV Continuous <Continuous>  dextrose 50% Injectable 12.5 Gram(s) IV Push once  dextrose 50% Injectable 25 Gram(s) IV Push once  dextrose 50% Injectable 25 Gram(s) IV Push once  heparin  Injectable 5000 Unit(s) SubCutaneous every 8 hours  hydroxychloroquine   Oral   hydroxychloroquine 200 milliGRAM(s) Oral every 12 hours  insulin glargine Injectable (LANTUS) 72 Unit(s) SubCutaneous at bedtime  insulin lispro (HumaLOG) corrective regimen sliding scale   SubCutaneous three times a day before meals  insulin lispro (HumaLOG) corrective regimen sliding scale   SubCutaneous at bedtime  insulin lispro Injectable (HumaLOG) 16 Unit(s) SubCutaneous three times a day with meals  labetalol 300 milliGRAM(s) Oral two times a day  methylPREDNISolone sodium succinate Injectable 40 milliGRAM(s) IV Push two times a day  tiotropium 18 MICROgram(s) Capsule 1 Capsule(s) Inhalation daily    MEDICATIONS  (PRN):  dextrose 40% Gel 15 Gram(s) Oral once PRN Blood Glucose LESS THAN 70 milliGRAM(s)/deciliter  glucagon  Injectable 1 milliGRAM(s) IntraMuscular once PRN Glucose LESS THAN 70 milligrams/deciliter    Vital Signs Last 24 Hrs  T(C): 36.7 (05 Apr 2020 10:10), Max: 36.7 (05 Apr 2020 10:10)  T(F): 98 (05 Apr 2020 10:10), Max: 98 (05 Apr 2020 10:10)  HR: 95 (05 Apr 2020 10:10) (63 - 95)  BP: 122/64 (05 Apr 2020 10:10) (118/68 - 127/81)  BP(mean): --  RR: 18 (05 Apr 2020 10:10) (18 - 20)  SpO2: 96% (05 Apr 2020 10:10) (95% - 100%)    I&O's Summary    04 Apr 2020 07:01  -  05 Apr 2020 07:00  --------------------------------------------------------  IN: 0 mL / OUT: 1200 mL / NET: -1200 mL          Physical Exam:   GENERAL: NAD, well-groomed, well-developed  HEENT: PABLITO/   Atraumatic, Normocephalic  ENMT: No tonsillar erythema, exudates, or enlargement; Moist mucous membranes, Good dentition, No lesions  NECK: Supple, No JVD, Normal thyroid  CHEST/LUNG: Clear to auscultaion, ; No rales, rhonchi, wheezing, or rubs  CVS: Regular rate and rhythm; No murmurs, rubs, or gallops  GI: : Soft, Nontender, Nondistended; Bowel sounds present  NERVOUS SYSTEM:  Alert & Oriented X3, Good concentration; Motor Strength 5/5 B/L upper and lower extremities; DTRs 2+ intact and symmetric  EXTREMITIES:  2+ Peripheral Pulses, No clubbing, cyanosis, or edema  LYMPH: No lymphadenopathy noted  SKIN: No rashes or lesions  ENDOCRINOLOGY: No Thyromegaly  PSYCH: Appropriate    Labs:  22, 23, 23                            13.5   14.01 )-----------( 408      ( 05 Apr 2020 06:10 )             40.2                         13.3   16.40 )-----------( 395      ( 04 Apr 2020 05:57 )             41.7                         13.5   13.98 )-----------( 363      ( 03 Apr 2020 08:50 )             40.9                         14.8   8.95  )-----------( 376      ( 02 Apr 2020 17:00 )             45.4                         15.0   11.34 )-----------( 330      ( 02 Apr 2020 06:04 )             45.5                         14.6   12.20 )-----------( 323      ( 01 Apr 2020 20:44 )             45.5     04-05    137  |  98  |  129<H>  ----------------------------<  210<H>  4.0   |  21<L>  |  4.37<H>  04-04    136  |  96<L>  |  126<H>  ----------------------------<  319<H>  4.5   |  24  |  4.97<H>  04-03    136  |  98  |  110<H>  ----------------------------<  378<H>  4.2   |  21<L>  |  4.66<H>  04-02    141  |  99  |  105<H>  ----------------------------<  367<H>  4.8   |  21<L>  |  4.85<H>  04-02    138  |  99  |  103<H>  ----------------------------<  403<H>  4.6   |  23  |  4.84<H>  04-02    136  |  98  |  96<H>  ----------------------------<  470<HH>  Test not performed   SPECIMEN MODERATELY HEMOLYZED   |  22  |  4.82<H>  04-01    135  |  96<L>  |  89<H>  ----------------------------<  375<H>  4.5   |  21<L>  |  4.88<H>    Ca    8.8      05 Apr 2020 06:10  Ca    9.0      04 Apr 2020 05:57  Phos  7.3     04-04  Mg     2.9     04-05  Mg     3.0     04-04    TPro  6.0  /  Alb  2.6<L>  /  TBili  0.2  /  DBili  x   /  AST  22  /  ALT  31  /  AlkPhos  71  04-05  TPro  6.3  /  Alb  2.8<L>  /  TBili  0.2  /  DBili  x   /  AST  20  /  ALT  30  /  AlkPhos  77  04-04  TPro  6.3  /  Alb  2.4<L>  /  TBili  0.2  /  DBili  x   /  AST  26  /  ALT  29  /  AlkPhos  81  04-03  TPro  6.1  /  Alb  2.8<L>  /  TBili  0.2  /  DBili  x   /  AST  28  /  ALT  31  /  AlkPhos  101  04-02  TPro  7.0  /  Alb  2.6<L>  /  TBili  0.2  /  DBili  x   /  AST  27  /  ALT  32  /  AlkPhos  93  04-02  TPro  7.3  /  Alb  2.4<L>  /  TBili  0.3  /  DBili  x   /  AST  40  /  ALT  35  /  AlkPhos  97  04-02    CAPILLARY BLOOD GLUCOSE      POCT Blood Glucose.: 206 mg/dL (05 Apr 2020 12:24)  POCT Blood Glucose.: 213 mg/dL (05 Apr 2020 08:31)  POCT Blood Glucose.: 125 mg/dL (04 Apr 2020 23:19)  POCT Blood Glucose.: 238 mg/dL (04 Apr 2020 17:26)      LIVER FUNCTIONS - ( 05 Apr 2020 06:10 )  Alb: 2.6 g/dL / Pro: 6.0 g/dL / ALK PHOS: 71 u/L / ALT: 31 u/L / AST: 22 u/L / GGT: x               D-Dimer Assay, Quantitative: 393 ng/mL (04-04 @ 15:19)  Serum Pro-Brain Natriuretic Peptide: 821.3 pg/mL (04-03 @ 08:50)        RECENT CULTURES:  < from: CT Chest No Cont (04.02.20 @ 11:17) >    FINDINGS:    LUNGS AND AIRWAYS: Patent central airways.  Evidence of bilateral predominantly groundglass opacity infiltrates with crazy paving. Differential includes but is not limited to atypical pneumonia. Bronchiectasis involving the lower lobes bilaterally, right more than left.    PLEURA: No pleural effusion.    MEDIASTINUM AND MARYA: Mild mediastinal lymphadenopathy, slightly progressed.    VESSELS: Coronary artery calcification. Stable enlarged main pulmonary artery measuring approximately 3.4 cm. Stable dilatation of the ascending thoracic aorta measuring 4.1 cm.    HEART: Mild cardiomegaly. No pericardial effusion.    CHEST WALL AND LOWER NECK: Left thyroid nodules with substernal extension, similar to the prior study. No tracheal compression.    VISUALIZED UPPER ABDOMEN: Prior cholecystectomy. Surgical clips related to the right hepatic lobe.    BONES: Within normal limits.    IMPRESSION:     Bilateral predominantly groundglass opacity infiltrates without associated pleural effusions. Differential includes but is not limited to atypical pneumonia. Correlate clinically.    Additional findings as above.        < end of copied text >        RESPIRATORY CULTURES:          Studies  Chest X-RAY  CT SCAN Chest   Venous Dopplers: LE:   CT Abdomen  Others

## 2020-04-05 NOTE — PROGRESS NOTE ADULT - ASSESSMENT
ECHO  5/10/19 minimial MR, moderate bi-atrial enlargement, nl LV sys fx, ef 67%, mod diastolic dysfx stage 2    a/p  65 year old male with hx Hypertension, Diabetes Mellitus, Aortic Regurgitation, Diastolic Heart Failure, CBD Stone, s/p Stent, PAD  shortness of breath x 1 day. Admitted for suspected COVID pneumonia vs. CHF exacerbation.    1. SOB; + covid infection, covid associated PNA  Covid + 4/2   -ct chest with bl predominatly grounglass opacity infiltrates without associated pleural effusions ? atypical pna   -QT wnl, continue Plaquenil continue monitoring EKG   -ID / med f/u   -02 supplementation as needed    2.  Chronic Diastolic CHF   -stable, s/p 40 mg IVP lasix x1; volume status stable  -avoid further lasix for now given elevated creat, acute covid infection   -monitor for fluid overloaded   -c/w BB   -repeat ECHO if feasible or bedside POCUS if pt deteriorates to eval LV function and AR     3. Aortic Regurgitation  -cv stable     dvt ppx

## 2020-04-05 NOTE — PROGRESS NOTE ADULT - ASSESSMENT
· Assessment		  64yo M with HTN, T2DM on insulin, HLD, copd presents with shortness of breath Admitted w/ COVID pneumonia      CoV id infection.  c/w plaquenil  added steroids/ anakinra /with dec in o2 requirement noted  - Monitor resp status and pulse ox.   pulm f/u    - Isolation  - Tylenol prn for fever and pleuritic chest pain.       ·  Problem: COPD (chronic obstructive pulmonary disease).   pulm f/u    ·  Problem: NANCY (acute kidney injury)  fxn no better  renal  to f/u       ·  Problem: Other congestive heart failure. Plan: - Unclear if HF contributing to current status of resp distress.  appears euvolemic        ·  Problem: Type 2 diabetes mellitus with hyperglycemia, with long-term current use of insulin  fsg elevated  endo eval noted  insulin adjusted      Problem: Essential hypertension. c/w meds as per cards  - Hold lisinopril given NANCY.

## 2020-04-06 LAB
ALBUMIN SERPL ELPH-MCNC: 2.9 G/DL — LOW (ref 3.3–5)
ALP SERPL-CCNC: 72 U/L — SIGNIFICANT CHANGE UP (ref 40–120)
ALT FLD-CCNC: 26 U/L — SIGNIFICANT CHANGE UP (ref 4–41)
ANION GAP SERPL CALC-SCNC: 17 MMO/L — HIGH (ref 7–14)
ANISOCYTOSIS BLD QL: SLIGHT — SIGNIFICANT CHANGE UP
AST SERPL-CCNC: 19 U/L — SIGNIFICANT CHANGE UP (ref 4–40)
BASOPHILS # BLD AUTO: 0.03 K/UL — SIGNIFICANT CHANGE UP (ref 0–0.2)
BASOPHILS NFR BLD AUTO: 0.2 % — SIGNIFICANT CHANGE UP (ref 0–2)
BASOPHILS NFR SPEC: 0 % — SIGNIFICANT CHANGE UP (ref 0–2)
BILIRUB SERPL-MCNC: 0.2 MG/DL — SIGNIFICANT CHANGE UP (ref 0.2–1.2)
BLASTS # FLD: 0 % — SIGNIFICANT CHANGE UP (ref 0–0)
BUN SERPL-MCNC: 120 MG/DL — HIGH (ref 7–23)
CALCIUM SERPL-MCNC: 9.1 MG/DL — SIGNIFICANT CHANGE UP (ref 8.4–10.5)
CHLORIDE SERPL-SCNC: 102 MMOL/L — SIGNIFICANT CHANGE UP (ref 98–107)
CO2 SERPL-SCNC: 22 MMOL/L — SIGNIFICANT CHANGE UP (ref 22–31)
CREAT SERPL-MCNC: 3.71 MG/DL — HIGH (ref 0.5–1.3)
EOSINOPHIL # BLD AUTO: 0 K/UL — SIGNIFICANT CHANGE UP (ref 0–0.5)
EOSINOPHIL NFR BLD AUTO: 0 % — SIGNIFICANT CHANGE UP (ref 0–6)
EOSINOPHIL NFR FLD: 0 % — SIGNIFICANT CHANGE UP (ref 0–6)
GLUCOSE BLDC GLUCOMTR-MCNC: 150 MG/DL — HIGH (ref 70–99)
GLUCOSE BLDC GLUCOMTR-MCNC: 170 MG/DL — HIGH (ref 70–99)
GLUCOSE BLDC GLUCOMTR-MCNC: 175 MG/DL — HIGH (ref 70–99)
GLUCOSE SERPL-MCNC: 156 MG/DL — HIGH (ref 70–99)
HCT VFR BLD CALC: 42.4 % — SIGNIFICANT CHANGE UP (ref 39–50)
HGB BLD-MCNC: 14.1 G/DL — SIGNIFICANT CHANGE UP (ref 13–17)
IMM GRANULOCYTES NFR BLD AUTO: 3.4 % — HIGH (ref 0–1.5)
LYMPHOCYTES # BLD AUTO: 0.35 K/UL — LOW (ref 1–3.3)
LYMPHOCYTES # BLD AUTO: 2.5 % — LOW (ref 13–44)
LYMPHOCYTES NFR SPEC AUTO: 0.8 % — LOW (ref 13–44)
MACROCYTES BLD QL: SLIGHT — SIGNIFICANT CHANGE UP
MAGNESIUM SERPL-MCNC: 3 MG/DL — HIGH (ref 1.6–2.6)
MCHC RBC-ENTMCNC: 28.1 PG — SIGNIFICANT CHANGE UP (ref 27–34)
MCHC RBC-ENTMCNC: 33.3 % — SIGNIFICANT CHANGE UP (ref 32–36)
MCV RBC AUTO: 84.5 FL — SIGNIFICANT CHANGE UP (ref 80–100)
METAMYELOCYTES # FLD: 0 % — SIGNIFICANT CHANGE UP (ref 0–1)
MICROCYTES BLD QL: SLIGHT — SIGNIFICANT CHANGE UP
MONOCYTES # BLD AUTO: 0.9 K/UL — SIGNIFICANT CHANGE UP (ref 0–0.9)
MONOCYTES NFR BLD AUTO: 6.4 % — SIGNIFICANT CHANGE UP (ref 2–14)
MONOCYTES NFR BLD: 7.6 % — SIGNIFICANT CHANGE UP (ref 2–9)
MYELOCYTES NFR BLD: 0 % — SIGNIFICANT CHANGE UP (ref 0–0)
NEUTROPHIL AB SER-ACNC: 88.2 % — HIGH (ref 43–77)
NEUTROPHILS # BLD AUTO: 12.3 K/UL — HIGH (ref 1.8–7.4)
NEUTROPHILS NFR BLD AUTO: 87.5 % — HIGH (ref 43–77)
NEUTS BAND # BLD: 1.7 % — SIGNIFICANT CHANGE UP (ref 0–6)
NRBC # BLD: 1 /100WBC — SIGNIFICANT CHANGE UP
NRBC # FLD: 0 K/UL — SIGNIFICANT CHANGE UP (ref 0–0)
OTHER - HEMATOLOGY %: 0 — SIGNIFICANT CHANGE UP
PHOSPHATE SERPL-MCNC: 5 MG/DL — HIGH (ref 2.5–4.5)
PLATELET # BLD AUTO: 404 K/UL — HIGH (ref 150–400)
PLATELET COUNT - ESTIMATE: NORMAL — SIGNIFICANT CHANGE UP
PMV BLD: 9.7 FL — SIGNIFICANT CHANGE UP (ref 7–13)
POIKILOCYTOSIS BLD QL AUTO: SLIGHT — SIGNIFICANT CHANGE UP
POLYCHROMASIA BLD QL SMEAR: SIGNIFICANT CHANGE UP
POTASSIUM SERPL-MCNC: 4 MMOL/L — SIGNIFICANT CHANGE UP (ref 3.5–5.3)
POTASSIUM SERPL-SCNC: 4 MMOL/L — SIGNIFICANT CHANGE UP (ref 3.5–5.3)
PROMYELOCYTES # FLD: 0 % — SIGNIFICANT CHANGE UP (ref 0–0)
PROT SERPL-MCNC: 6.5 G/DL — SIGNIFICANT CHANGE UP (ref 6–8.3)
RBC # BLD: 5.02 M/UL — SIGNIFICANT CHANGE UP (ref 4.2–5.8)
RBC # FLD: 13.5 % — SIGNIFICANT CHANGE UP (ref 10.3–14.5)
SODIUM SERPL-SCNC: 141 MMOL/L — SIGNIFICANT CHANGE UP (ref 135–145)
VARIANT LYMPHS # BLD: 1.7 % — SIGNIFICANT CHANGE UP
WBC # BLD: 14.06 K/UL — HIGH (ref 3.8–10.5)
WBC # FLD AUTO: 14.06 K/UL — HIGH (ref 3.8–10.5)

## 2020-04-06 PROCEDURE — 99233 SBSQ HOSP IP/OBS HIGH 50: CPT | Mod: GC

## 2020-04-06 RX ORDER — ANAKINRA 100MG/0.67
100 SYRINGE (ML) SUBCUTANEOUS EVERY 12 HOURS
Refills: 0 | Status: COMPLETED | OUTPATIENT
Start: 2020-04-07 | End: 2020-04-09

## 2020-04-06 RX ADMIN — Medication 200 MILLIGRAM(S): at 16:48

## 2020-04-06 RX ADMIN — ALBUTEROL 2 PUFF(S): 90 AEROSOL, METERED ORAL at 09:07

## 2020-04-06 RX ADMIN — ALBUTEROL 2 PUFF(S): 90 AEROSOL, METERED ORAL at 22:43

## 2020-04-06 RX ADMIN — Medication 2: at 12:29

## 2020-04-06 RX ADMIN — BUDESONIDE AND FORMOTEROL FUMARATE DIHYDRATE 2 PUFF(S): 160; 4.5 AEROSOL RESPIRATORY (INHALATION) at 09:07

## 2020-04-06 RX ADMIN — BUDESONIDE AND FORMOTEROL FUMARATE DIHYDRATE 2 PUFF(S): 160; 4.5 AEROSOL RESPIRATORY (INHALATION) at 22:43

## 2020-04-06 RX ADMIN — Medication 0.2 MILLIGRAM(S): at 16:48

## 2020-04-06 RX ADMIN — HEPARIN SODIUM 5000 UNIT(S): 5000 INJECTION INTRAVENOUS; SUBCUTANEOUS at 12:28

## 2020-04-06 RX ADMIN — Medication 300 MILLIGRAM(S): at 16:50

## 2020-04-06 RX ADMIN — HEPARIN SODIUM 5000 UNIT(S): 5000 INJECTION INTRAVENOUS; SUBCUTANEOUS at 05:12

## 2020-04-06 RX ADMIN — Medication 100 MILLIGRAM(S): at 05:08

## 2020-04-06 RX ADMIN — Medication 81 MILLIGRAM(S): at 12:28

## 2020-04-06 RX ADMIN — Medication 100 MILLIGRAM(S): at 09:09

## 2020-04-06 RX ADMIN — Medication 300 MILLIGRAM(S): at 05:12

## 2020-04-06 RX ADMIN — INSULIN GLARGINE 72 UNIT(S): 100 INJECTION, SOLUTION SUBCUTANEOUS at 22:42

## 2020-04-06 RX ADMIN — Medication 16 UNIT(S): at 08:26

## 2020-04-06 RX ADMIN — Medication 200 MILLIGRAM(S): at 05:12

## 2020-04-06 RX ADMIN — TIOTROPIUM BROMIDE 1 CAPSULE(S): 18 CAPSULE ORAL; RESPIRATORY (INHALATION) at 09:07

## 2020-04-06 RX ADMIN — Medication 16 UNIT(S): at 16:49

## 2020-04-06 RX ADMIN — Medication 2: at 16:48

## 2020-04-06 RX ADMIN — Medication 40 MILLIGRAM(S): at 16:50

## 2020-04-06 RX ADMIN — ALBUTEROL 2 PUFF(S): 90 AEROSOL, METERED ORAL at 05:24

## 2020-04-06 RX ADMIN — Medication 16 UNIT(S): at 12:29

## 2020-04-06 RX ADMIN — AMLODIPINE BESYLATE 10 MILLIGRAM(S): 2.5 TABLET ORAL at 05:10

## 2020-04-06 RX ADMIN — HEPARIN SODIUM 5000 UNIT(S): 5000 INJECTION INTRAVENOUS; SUBCUTANEOUS at 22:42

## 2020-04-06 RX ADMIN — ALBUTEROL 2 PUFF(S): 90 AEROSOL, METERED ORAL at 16:51

## 2020-04-06 RX ADMIN — Medication 0.2 MILLIGRAM(S): at 05:11

## 2020-04-06 RX ADMIN — Medication 40 MILLIGRAM(S): at 05:15

## 2020-04-06 NOTE — PROGRESS NOTE ADULT - SUBJECTIVE AND OBJECTIVE BOX
CHIEF COMPLAINT:Patient is a 65y old  Male who presents with a chief complaint of pneumonia, resp failure (06 Apr 2020 13:59)    	        PAST MEDICAL & SURGICAL HISTORY:  Heart murmur: recent dx  Cholelithiasis  COPD (chronic obstructive pulmonary disease)  Diabetes: fs this am 120  Hypertension  S/P exploratory laparotomy: 23 years ago          REVIEW OF SYSTEMS:  feels better  less sob/ cough    CARDIOVASCULAR: No chest pain, palpitations, passing out, dizziness, or leg swelling  GASTROINTESTINAL: No abdominal or epigastric pain. No nausea, vomiting, or hematemesis; No diarrhea or constipation. No melena or hematochezia.  GENITOURINARY: No dysuria, frequency, hematuria, or incontinence  NEUROLOGICAL: No headaches, memory loss,     Medications:  MEDICATIONS  (STANDING):  ALBUTerol    90 MICROgram(s) HFA Inhaler 2 Puff(s) Inhalation every 6 hours  amLODIPine   Tablet 10 milliGRAM(s) Oral daily  anakinra Injectable 100 milliGRAM(s) SubCutaneous every 12 hours  aspirin  chewable 81 milliGRAM(s) Oral daily  budesonide 160 MICROgram(s)/formoterol 4.5 MICROgram(s) Inhaler 2 Puff(s) Inhalation two times a day  cloNIDine 0.2 milliGRAM(s) Oral two times a day  dextrose 5%. 1000 milliLiter(s) (50 mL/Hr) IV Continuous <Continuous>  dextrose 50% Injectable 12.5 Gram(s) IV Push once  dextrose 50% Injectable 25 Gram(s) IV Push once  dextrose 50% Injectable 25 Gram(s) IV Push once  heparin  Injectable 5000 Unit(s) SubCutaneous every 8 hours  hydroxychloroquine   Oral   hydroxychloroquine 200 milliGRAM(s) Oral every 12 hours  insulin glargine Injectable (LANTUS) 72 Unit(s) SubCutaneous at bedtime  insulin lispro (HumaLOG) corrective regimen sliding scale   SubCutaneous three times a day before meals  insulin lispro (HumaLOG) corrective regimen sliding scale   SubCutaneous at bedtime  insulin lispro Injectable (HumaLOG) 16 Unit(s) SubCutaneous three times a day with meals  labetalol 300 milliGRAM(s) Oral two times a day  methylPREDNISolone sodium succinate Injectable 40 milliGRAM(s) IV Push two times a day  tiotropium 18 MICROgram(s) Capsule 1 Capsule(s) Inhalation daily    MEDICATIONS  (PRN):  dextrose 40% Gel 15 Gram(s) Oral once PRN Blood Glucose LESS THAN 70 milliGRAM(s)/deciliter  glucagon  Injectable 1 milliGRAM(s) IntraMuscular once PRN Glucose LESS THAN 70 milligrams/deciliter    	    PHYSICAL EXAM:  T(C): 36.8 (04-06-20 @ 14:06), Max: 36.8 (04-06-20 @ 14:06)  HR: 74 (04-06-20 @ 14:06) (68 - 97)  BP: 134/78 (04-06-20 @ 14:06) (123/79 - 148/81)  RR: 18 (04-06-20 @ 05:07) (18 - 18)  SpO2: 86% (04-06-20 @ 10:07) (86% - 95%)  Wt(kg): --  I&O's Summary      Appearance: Normal	  HEENT:   Normal oral mucosa, PERRL, EOMI	  Lymphatic: No lymphadenopathy  Cardiovascular: Normal S1 S2, No JVD, No murmurs, No edema  Respiratory: Lungs clear to auscultation	  Psychiatry: A & O x 3, Mood & affect appropriate  Gastrointestinal:  Soft, Non-tender, + BS	  Skin: No rashes, No ecchymoses, No cyanosis	  Neurologic: Non-focal  Extremities: Normal range of motion, No clubbing, cyanosis or edema  Vascular: Peripheral pulses palpable 2+ bilaterally    TELEMETRY: 	    ECG:  	  RADIOLOGY:  OTHER: 	  	  LABS:	 	    CARDIAC MARKERS:                                14.1   14.06 )-----------( 404      ( 06 Apr 2020 04:47 )             42.4     04-06    141  |  102  |  120<H>  ----------------------------<  156<H>  4.0   |  22  |  3.71<H>    Ca    9.1      06 Apr 2020 04:47  Phos  5.0     04-06  Mg     3.0     04-06    TPro  6.5  /  Alb  2.9<L>  /  TBili  0.2  /  DBili  x   /  AST  19  /  ALT  26  /  AlkPhos  72  04-06    proBNP:   Lipid Profile:   HgA1c:   TSH:

## 2020-04-06 NOTE — PROGRESS NOTE ADULT - ASSESSMENT
· Assessment		  66yo M with HTN, T2DM on insulin, HLD, copd presents with shortness of breath Admitted w/ COVID pneumonia      CoV id infection.  c/w plaquenil  added steroids/ anakinra /with dec in o2 requirement noted  - Monitor resp status and pulse ox.   pulm f/u noted    - Isolation  - Tylenol prn for fever and pleuritic chest pain.       ·  Problem: COPD (chronic obstructive pulmonary disease).   pulm f/u noted    ·  Problem: NANCY (acute kidney injury)  renal fxn improved  renal f/u noted      ·  Problem: Other congestive heart failure. Plan: - Unclear if HF contributing to current status of resp distress.  appears euvolemic        ·  Problem: Type 2 diabetes mellitus with hyperglycemia, with long-term current use of insulin  fsg elevated  endo eval noted  insulin adjusted      Problem: Essential hypertension. c/w meds as per cards  - Hold lisinopril given NANCY.

## 2020-04-06 NOTE — PROGRESS NOTE ADULT - SUBJECTIVE AND OBJECTIVE BOX
CARDIOLOGY FOLLOW UP - Dr. South    CC no acute events       PHYSICAL EXAM:  T(C): 36.4 (04-06-20 @ 05:07), Max: 36.4 (04-06-20 @ 05:07)  HR: 68 (04-06-20 @ 05:07) (68 - 97)  BP: 146/80 (04-06-20 @ 05:07) (123/79 - 148/81)  RR: 18 (04-06-20 @ 05:07) (18 - 18)  SpO2: 86% (04-06-20 @ 10:07) (86% - 95%)  Wt(kg): --  I&O's Summary            MEDICATIONS  (STANDING):  ALBUTerol    90 MICROgram(s) HFA Inhaler 2 Puff(s) Inhalation every 6 hours  amLODIPine   Tablet 10 milliGRAM(s) Oral daily  aspirin  chewable 81 milliGRAM(s) Oral daily  budesonide 160 MICROgram(s)/formoterol 4.5 MICROgram(s) Inhaler 2 Puff(s) Inhalation two times a day  cloNIDine 0.2 milliGRAM(s) Oral two times a day  dextrose 5%. 1000 milliLiter(s) (50 mL/Hr) IV Continuous <Continuous>  dextrose 50% Injectable 12.5 Gram(s) IV Push once  dextrose 50% Injectable 25 Gram(s) IV Push once  dextrose 50% Injectable 25 Gram(s) IV Push once  heparin  Injectable 5000 Unit(s) SubCutaneous every 8 hours  hydroxychloroquine   Oral   hydroxychloroquine 200 milliGRAM(s) Oral every 12 hours  insulin glargine Injectable (LANTUS) 72 Unit(s) SubCutaneous at bedtime  insulin lispro (HumaLOG) corrective regimen sliding scale   SubCutaneous three times a day before meals  insulin lispro (HumaLOG) corrective regimen sliding scale   SubCutaneous at bedtime  insulin lispro Injectable (HumaLOG) 16 Unit(s) SubCutaneous three times a day with meals  labetalol 300 milliGRAM(s) Oral two times a day  methylPREDNISolone sodium succinate Injectable 40 milliGRAM(s) IV Push two times a day  tiotropium 18 MICROgram(s) Capsule 1 Capsule(s) Inhalation daily      TELEMETRY: 	    ECG:  	  RADIOLOGY:   DIAGNOSTIC TESTING:  [ ] Echocardiogram:  [ ]  Catheterization:  [ ] Stress Test:    OTHER: 	    LABS:	 	    Troponin T, High Sensitivity: 45 ng/L [<6 - 14] (04-02 @ 06:04)  Troponin T, High Sensitivity: 56 ng/L [<6 - 14] (04-01 @ 22:20)                          14.1   14.06 )-----------( 404      ( 06 Apr 2020 04:47 )             42.4     04-06    141  |  102  |  120<H>  ----------------------------<  156<H>  4.0   |  22  |  3.71<H>    Ca    9.1      06 Apr 2020 04:47  Phos  5.0     04-06  Mg     3.0     04-06    TPro  6.5  /  Alb  2.9<L>  /  TBili  0.2  /  DBili  x   /  AST  19  /  ALT  26  /  AlkPhos  72  04-06

## 2020-04-06 NOTE — PROGRESS NOTE ADULT - SUBJECTIVE AND OBJECTIVE BOX
[facility] DIVISION OF KIDNEY DISEASES AND HYPERTENSION -- FOLLOW UP NOTE  --------------------------------------------------------------------------------  HPI: 66yo M with HTN, T2DM on insulin, HLD, COPD presents to the ED with shortness of breath, admitted for COVID-19. Nephrology consulted for NANCY. Patient denies any personal or family history of kidney disease. Of note, patient on Lasix and Lisinopril (as per prescription writer) and reports compliance with his medications. [facility] reviewed, last outpatient Scr was 1.24 on 6/28/18. On arrival to the ER (4/1/20), Scr was 4.88 peaked to 4.97 on 4/4/20, and today is 3.71.    PAST HISTORY  --------------------------------------------------------------------------------  No significant changes to PMH, PSH, FHx, SHx, unless otherwise noted    ALLERGIES & MEDICATIONS  --------------------------------------------------------------------------------  Allergies    No Known Allergies    Intolerances    Standing Inpatient Medications  ALBUTerol    90 MICROgram(s) HFA Inhaler 2 Puff(s) Inhalation every 6 hours  amLODIPine   Tablet 10 milliGRAM(s) Oral daily  aspirin  chewable 81 milliGRAM(s) Oral daily  budesonide 160 MICROgram(s)/formoterol 4.5 MICROgram(s) Inhaler 2 Puff(s) Inhalation two times a day  cloNIDine 0.2 milliGRAM(s) Oral two times a day  dextrose 5%. 1000 milliLiter(s) IV Continuous <Continuous>  dextrose 50% Injectable 12.5 Gram(s) IV Push once  dextrose 50% Injectable 25 Gram(s) IV Push once  dextrose 50% Injectable 25 Gram(s) IV Push once  heparin  Injectable 5000 Unit(s) SubCutaneous every 8 hours  hydroxychloroquine   Oral   hydroxychloroquine 200 milliGRAM(s) Oral every 12 hours  insulin glargine Injectable (LANTUS) 72 Unit(s) SubCutaneous at bedtime  insulin lispro (HumaLOG) corrective regimen sliding scale   SubCutaneous three times a day before meals  insulin lispro (HumaLOG) corrective regimen sliding scale   SubCutaneous at bedtime  insulin lispro Injectable (HumaLOG) 16 Unit(s) SubCutaneous three times a day with meals  labetalol 300 milliGRAM(s) Oral two times a day  methylPREDNISolone sodium succinate Injectable 40 milliGRAM(s) IV Push two times a day  tiotropium 18 MICROgram(s) Capsule 1 Capsule(s) Inhalation daily    REVIEW OF SYSTEMS  --------------------------------------------------------------------------------  Gen: no lethargy  Respiratory: No dyspnea  CV: No chest pain  GI: No abdominal pain  MSK: trace LE edema  Neuro: No dizziness  Heme: No bleeding    All other systems were reviewed and are negative, except as noted.    VITALS/PHYSICAL EXAM  --------------------------------------------------------------------------------  T(C): 36.4 (04-06-20 @ 05:07), Max: 36.4 (04-06-20 @ 05:07)  HR: 68 (04-06-20 @ 05:07) (68 - 97)  BP: 146/80 (04-06-20 @ 05:07) (123/79 - 148/81)  RR: 18 (04-06-20 @ 05:07) (18 - 18)  SpO2: 86% (04-06-20 @ 10:07) (86% - 95%)  Wt(kg): --    Physical Exam:  	Gen: NAD  	HEENT: MMM  	Pulm: CTA B/L  	CV: S1S2  	Abd: Soft, +BS   	Ext: No LE edema B/L  	Neuro: Awake  	Skin: Warm and dry    LABS/STUDIES  --------------------------------------------------------------------------------              14.1   14.06 >-----------<  404      [04-06-20 @ 04:47]              42.4     141  |  102  |  120  ----------------------------<  156      [04-06-20 @ 04:47]  4.0   |  22  |  3.71        Ca     9.1     [04-06-20 @ 04:47]      Mg     3.0     [04-06-20 @ 04:47]      Phos  5.0     [04-06-20 @ 04:47]    TPro  6.5  /  Alb  2.9  /  TBili  0.2  /  DBili  x   /  AST  19  /  ALT  26  /  AlkPhos  72  [04-06-20 @ 04:47]    Creatinine Trend:  SCr 3.71 [04-06 @ 04:47]  SCr 4.37 [04-05 @ 06:10]  SCr 4.97 [04-04 @ 05:57]  SCr 4.66 [04-03 @ 08:50]  SCr 4.85 [04-02 @ 17:00]    Urinalysis - [04-02-20 @ 17:25]      Color YELLOW / Appearance Lt TURBID / SG 1.019 / pH 6.0      Gluc 500 / Ketone NEGATIVE  / Bili NEGATIVE / Urobili NORMAL       Blood MODERATE / Protein 600 / Leuk Est NEGATIVE / Nitrite NEGATIVE      RBC 3-5 / WBC 6-10 / Hyaline  / Gran  / Sq Epi FEW / Non Sq Epi  / Bacteria FEW    Urine Sodium < 20      [04-02-20 @ 17:25]  Urine Potassium 46.1      [04-02-20 @ 17:25]  Urine Chloride < 20      [04-02-20 @ 17:25]    Ferritin 533.2      [04-05-20 @ 18:23]  HbA1c 8.9      [04-02-20 @ 17:00]    HBsAg NEGATIVE      [04-02-20 @ 17:00]  HCV 0.27, Nonreactive Hepatitis C AB

## 2020-04-06 NOTE — PROGRESS NOTE ADULT - ASSESSMENT
66yo M with HTN, T2DM on insulin, HLD, copd presents with shortness of breath x 1 day. Admitted for suspected COVID pneumonia vs. CHF exacerbation.    COVID PNEUMONIA    d4 of plaquenil  d3 of anakinra  day 3 of solumedrol:  on 3 L of nasal cannula    crp is decreasing:   follow up d d crow as wellas ferritin tomorrow: he has high ferritin:  LFTS normal   4/6:  d5 of laquenil: and steorids: dc after today :  d4 of anakinra: from tomorrow am: finished qid dosing today   crp is down:       DM    monitor and control per PMD as on high dose steoird s  4/6: dc steorids after today   HTN  CHF   NANCY

## 2020-04-06 NOTE — PROGRESS NOTE ADULT - PROBLEM SELECTOR PLAN 1
Pt. with likely hemodynamically mediated NANYC in the setting of COVID-19, ACE-i, and diuretics.   Last outpatient Scr was 1.24 on 6/28/18. On arrival to the ER (4/1/20), Scr was 4.88, peaked to 4.97 on 4/4/20 and has since improved to 3.71 today. UA significant for proteinura, and Skinny consistent with pre-renal. US Kidneys negative for hydronephrosis. Pt. with likely ATN. Continue to hold diuretics and ACE-i. Check spot urine TP/Cr ratio. Encourage PO intake. Monitor labs and urine output. Avoid potential nephrotoxins.    If any questions, please feel free to contact me  Mark Villagomez   Nephrology Fellow  [phone]  (After 5 pm or on weekends please page the on-call fellow)

## 2020-04-06 NOTE — PROGRESS NOTE ADULT - SUBJECTIVE AND OBJECTIVE BOX
Patient is a 65y old  Male who presents with a chief complaint of pneumonia, resp failure (06 Apr 2020 13:17)      Any change in ROS:     MEDICATIONS  (STANDING):  ALBUTerol    90 MICROgram(s) HFA Inhaler 2 Puff(s) Inhalation every 6 hours  amLODIPine   Tablet 10 milliGRAM(s) Oral daily  aspirin  chewable 81 milliGRAM(s) Oral daily  budesonide 160 MICROgram(s)/formoterol 4.5 MICROgram(s) Inhaler 2 Puff(s) Inhalation two times a day  cloNIDine 0.2 milliGRAM(s) Oral two times a day  dextrose 5%. 1000 milliLiter(s) (50 mL/Hr) IV Continuous <Continuous>  dextrose 50% Injectable 12.5 Gram(s) IV Push once  dextrose 50% Injectable 25 Gram(s) IV Push once  dextrose 50% Injectable 25 Gram(s) IV Push once  heparin  Injectable 5000 Unit(s) SubCutaneous every 8 hours  hydroxychloroquine   Oral   hydroxychloroquine 200 milliGRAM(s) Oral every 12 hours  insulin glargine Injectable (LANTUS) 72 Unit(s) SubCutaneous at bedtime  insulin lispro (HumaLOG) corrective regimen sliding scale   SubCutaneous three times a day before meals  insulin lispro (HumaLOG) corrective regimen sliding scale   SubCutaneous at bedtime  insulin lispro Injectable (HumaLOG) 16 Unit(s) SubCutaneous three times a day with meals  labetalol 300 milliGRAM(s) Oral two times a day  methylPREDNISolone sodium succinate Injectable 40 milliGRAM(s) IV Push two times a day  tiotropium 18 MICROgram(s) Capsule 1 Capsule(s) Inhalation daily    MEDICATIONS  (PRN):  dextrose 40% Gel 15 Gram(s) Oral once PRN Blood Glucose LESS THAN 70 milliGRAM(s)/deciliter  glucagon  Injectable 1 milliGRAM(s) IntraMuscular once PRN Glucose LESS THAN 70 milligrams/deciliter    Vital Signs Last 24 Hrs  T(C): 36.4 (06 Apr 2020 05:07), Max: 36.4 (06 Apr 2020 05:07)  T(F): 97.6 (06 Apr 2020 05:07), Max: 97.6 (06 Apr 2020 05:07)  HR: 68 (06 Apr 2020 05:07) (68 - 97)  BP: 146/80 (06 Apr 2020 05:07) (123/79 - 148/81)  BP(mean): --  RR: 18 (06 Apr 2020 05:07) (18 - 18)  SpO2: 86% (06 Apr 2020 10:07) (86% - 95%)    I&O's Summary        Physical Exam:   GENERAL: NAD, well-groomed, well-developed  HEENT: PABLITO/   Atraumatic, Normocephalic  ENMT: No tonsillar erythema, exudates, or enlargement; Moist mucous membranes, Good dentition, No lesions  NECK: Supple, No JVD, Normal thyroid  CHEST/LUNG: Clear to auscultaion, ; No rales, rhonchi, wheezing, or rubs  CVS: Regular rate and rhythm; No murmurs, rubs, or gallops  GI: : Soft, Nontender, Nondistended; Bowel sounds present  NERVOUS SYSTEM:  Alert & Oriented X3, Good concentration; Motor Strength 5/5 B/L upper and lower extremities; DTRs 2+ intact and symmetric  EXTREMITIES:  2+ Peripheral Pulses, No clubbing, cyanosis, or edema  LYMPH: No lymphadenopathy noted  SKIN: No rashes or lesions  ENDOCRINOLOGY: No Thyromegaly  PSYCH: Appropriate    Labs:  22, 23, 23                            14.1   14.06 )-----------( 404      ( 06 Apr 2020 04:47 )             42.4                         13.5   14.01 )-----------( 408      ( 05 Apr 2020 06:10 )             40.2                         13.3   16.40 )-----------( 395      ( 04 Apr 2020 05:57 )             41.7                         13.5   13.98 )-----------( 363      ( 03 Apr 2020 08:50 )             40.9                         14.8   8.95  )-----------( 376      ( 02 Apr 2020 17:00 )             45.4     04-06    141  |  102  |  120<H>  ----------------------------<  156<H>  4.0   |  22  |  3.71<H>  04-05    137  |  98  |  129<H>  ----------------------------<  210<H>  4.0   |  21<L>  |  4.37<H>  04-04    136  |  96<L>  |  126<H>  ----------------------------<  319<H>  4.5   |  24  |  4.97<H>  04-03    136  |  98  |  110<H>  ----------------------------<  378<H>  4.2   |  21<L>  |  4.66<H>  04-02    141  |  99  |  105<H>  ----------------------------<  367<H>  4.8   |  21<L>  |  4.85<H>  04-02    138  |  99  |  103<H>  ----------------------------<  403<H>  4.6   |  23  |  4.84<H>    Ca    9.1      06 Apr 2020 04:47  Ca    8.8      05 Apr 2020 06:10  Phos  5.0     04-06  Mg     3.0     04-06  Mg     2.9     04-05    TPro  6.5  /  Alb  2.9<L>  /  TBili  0.2  /  DBili  x   /  AST  19  /  ALT  26  /  AlkPhos  72  04-06  TPro  6.0  /  Alb  2.6<L>  /  TBili  0.2  /  DBili  x   /  AST  22  /  ALT  31  /  AlkPhos  71  04-05  TPro  6.3  /  Alb  2.8<L>  /  TBili  0.2  /  DBili  x   /  AST  20  /  ALT  30  /  AlkPhos  77  04-04  TPro  6.3  /  Alb  2.4<L>  /  TBili  0.2  /  DBili  x   /  AST  26  /  ALT  29  /  AlkPhos  81  04-03  TPro  6.1  /  Alb  2.8<L>  /  TBili  0.2  /  DBili  x   /  AST  28  /  ALT  31  /  AlkPhos  101  04-02  TPro  7.0  /  Alb  2.6<L>  /  TBili  0.2  /  DBili  x   /  AST  27  /  ALT  32  /  AlkPhos  93  04-02    CAPILLARY BLOOD GLUCOSE  143 (06 Apr 2020 05:07)      POCT Blood Glucose.: 175 mg/dL (06 Apr 2020 11:31)  POCT Blood Glucose.: 159 mg/dL (05 Apr 2020 21:27)  POCT Blood Glucose.: 186 mg/dL (05 Apr 2020 17:09)      LIVER FUNCTIONS - ( 06 Apr 2020 04:47 )  Alb: 2.9 g/dL / Pro: 6.5 g/dL / ALK PHOS: 72 u/L / ALT: 26 u/L / AST: 19 u/L / GGT: x               D-Dimer Assay, Quantitative: 486 ng/mL (04-05 @ 18:23)  D-Dimer Assay, Quantitative: 393 ng/mL (04-04 @ 15:19)        RECENT CULTURES:        RESPIRATORY CULTURES:          Studies  Chest X-RAY  CT SCAN Chest   Venous Dopplers: LE:   CT Abdomen  Others      < from: CT Chest No Cont (04.02.20 @ 11:17) >    MEDIASTINUM AND MARYA: Mild mediastinal lymphadenopathy, slightly progressed.    VESSELS: Coronary artery calcification. Stable enlarged main pulmonary artery measuring approximately 3.4 cm. Stable dilatation of the ascending thoracic aorta measuring 4.1 cm.    HEART: Mild cardiomegaly. No pericardial effusion.    CHEST WALL AND LOWER NECK: Left thyroid nodules with substernal extension, similar to the prior study. No tracheal compression.    VISUALIZED UPPER ABDOMEN: Prior cholecystectomy. Surgical clips related to the right hepatic lobe.    BONES: Within normal limits.    IMPRESSION:     Bilateral predominantly groundglass opacity infiltrates without associated pleural effusions. Differential includes but is not limited to atypical pneumonia. Correlate clinically.    Additional findings as above.          < end of copied text >

## 2020-04-07 LAB
GLUCOSE BLDC GLUCOMTR-MCNC: 107 MG/DL — HIGH (ref 70–99)
GLUCOSE BLDC GLUCOMTR-MCNC: 170 MG/DL — HIGH (ref 70–99)
GLUCOSE BLDC GLUCOMTR-MCNC: 91 MG/DL — SIGNIFICANT CHANGE UP (ref 70–99)
GLUCOSE BLDC GLUCOMTR-MCNC: 93 MG/DL — SIGNIFICANT CHANGE UP (ref 70–99)

## 2020-04-07 PROCEDURE — 99231 SBSQ HOSP IP/OBS SF/LOW 25: CPT

## 2020-04-07 RX ORDER — INSULIN LISPRO 100/ML
8 VIAL (ML) SUBCUTANEOUS
Refills: 0 | Status: DISCONTINUED | OUTPATIENT
Start: 2020-04-07 | End: 2020-04-08

## 2020-04-07 RX ORDER — INSULIN GLARGINE 100 [IU]/ML
55 INJECTION, SOLUTION SUBCUTANEOUS AT BEDTIME
Refills: 0 | Status: DISCONTINUED | OUTPATIENT
Start: 2020-04-07 | End: 2020-04-08

## 2020-04-07 RX ADMIN — AMLODIPINE BESYLATE 10 MILLIGRAM(S): 2.5 TABLET ORAL at 06:22

## 2020-04-07 RX ADMIN — TIOTROPIUM BROMIDE 1 CAPSULE(S): 18 CAPSULE ORAL; RESPIRATORY (INHALATION) at 08:11

## 2020-04-07 RX ADMIN — Medication 8 UNIT(S): at 17:30

## 2020-04-07 RX ADMIN — Medication 2: at 12:34

## 2020-04-07 RX ADMIN — Medication 0.2 MILLIGRAM(S): at 16:48

## 2020-04-07 RX ADMIN — BUDESONIDE AND FORMOTEROL FUMARATE DIHYDRATE 2 PUFF(S): 160; 4.5 AEROSOL RESPIRATORY (INHALATION) at 08:11

## 2020-04-07 RX ADMIN — ALBUTEROL 2 PUFF(S): 90 AEROSOL, METERED ORAL at 08:11

## 2020-04-07 RX ADMIN — Medication 16 UNIT(S): at 12:34

## 2020-04-07 RX ADMIN — Medication 81 MILLIGRAM(S): at 12:33

## 2020-04-07 RX ADMIN — Medication 100 MILLIGRAM(S): at 17:21

## 2020-04-07 RX ADMIN — HEPARIN SODIUM 5000 UNIT(S): 5000 INJECTION INTRAVENOUS; SUBCUTANEOUS at 22:05

## 2020-04-07 RX ADMIN — Medication 40 MILLIGRAM(S): at 06:23

## 2020-04-07 RX ADMIN — Medication 0.2 MILLIGRAM(S): at 06:22

## 2020-04-07 RX ADMIN — ALBUTEROL 2 PUFF(S): 90 AEROSOL, METERED ORAL at 04:00

## 2020-04-07 RX ADMIN — ALBUTEROL 2 PUFF(S): 90 AEROSOL, METERED ORAL at 16:48

## 2020-04-07 RX ADMIN — Medication 100 MILLIGRAM(S): at 06:22

## 2020-04-07 RX ADMIN — Medication 300 MILLIGRAM(S): at 06:23

## 2020-04-07 RX ADMIN — BUDESONIDE AND FORMOTEROL FUMARATE DIHYDRATE 2 PUFF(S): 160; 4.5 AEROSOL RESPIRATORY (INHALATION) at 22:05

## 2020-04-07 RX ADMIN — HEPARIN SODIUM 5000 UNIT(S): 5000 INJECTION INTRAVENOUS; SUBCUTANEOUS at 12:33

## 2020-04-07 RX ADMIN — Medication 16 UNIT(S): at 08:13

## 2020-04-07 RX ADMIN — Medication 300 MILLIGRAM(S): at 16:48

## 2020-04-07 RX ADMIN — ALBUTEROL 2 PUFF(S): 90 AEROSOL, METERED ORAL at 22:05

## 2020-04-07 RX ADMIN — Medication 200 MILLIGRAM(S): at 05:00

## 2020-04-07 RX ADMIN — HEPARIN SODIUM 5000 UNIT(S): 5000 INJECTION INTRAVENOUS; SUBCUTANEOUS at 06:22

## 2020-04-07 NOTE — PROGRESS NOTE ADULT - ASSESSMENT
ECHO  5/10/19 minimial MR, moderate bi-atrial enlargement, nl LV sys fx, ef 67%, mod diastolic dysfx stage 2    a/p  65 year old male with hx Hypertension, Diabetes Mellitus, Aortic Regurgitation, Diastolic Heart Failure, CBD Stone, s/p Stent, PAD  shortness of breath x 1 day. Admitted for suspected COVID pneumonia vs. CHF exacerbation.    1. SOB; + covid infection, covid associated PNA  -Covid + 4/2   -ct chest with bl predominatly grounglass opacity infiltrates without associated pleural effusions ? atypical pna   -s/p plaquenil  -ID / med f/u   -02 supplementation as needed    2.  Chronic Diastolic CHF   -stable, s/p 40 mg IVP lasix x1; volume status stable  -avoid further lasix for now given elevated creat, acute covid infection   -monitor for fluid overloaded   -c/w bb   -repeat ECHO if feasible or bedside POCUS if pt deteriorates to eval LV function and AR     3. Aortic Regurgitation  -cv stable     dvt ppx

## 2020-04-07 NOTE — PROGRESS NOTE ADULT - SUBJECTIVE AND OBJECTIVE BOX
Patient is a 65y old  Male who presents with a chief complaint of pneumonia, resp failure (07 Apr 2020 09:55)      Any change in ROS: pt is doing ok : sleeping comfortably   on 3 L of nsal cannula    MEDICATIONS  (STANDING):  ALBUTerol    90 MICROgram(s) HFA Inhaler 2 Puff(s) Inhalation every 6 hours  amLODIPine   Tablet 10 milliGRAM(s) Oral daily  anakinra Injectable 100 milliGRAM(s) SubCutaneous every 12 hours  aspirin  chewable 81 milliGRAM(s) Oral daily  budesonide 160 MICROgram(s)/formoterol 4.5 MICROgram(s) Inhaler 2 Puff(s) Inhalation two times a day  cloNIDine 0.2 milliGRAM(s) Oral two times a day  dextrose 5%. 1000 milliLiter(s) (50 mL/Hr) IV Continuous <Continuous>  dextrose 50% Injectable 12.5 Gram(s) IV Push once  dextrose 50% Injectable 25 Gram(s) IV Push once  dextrose 50% Injectable 25 Gram(s) IV Push once  heparin  Injectable 5000 Unit(s) SubCutaneous every 8 hours  insulin glargine Injectable (LANTUS) 72 Unit(s) SubCutaneous at bedtime  insulin lispro (HumaLOG) corrective regimen sliding scale   SubCutaneous three times a day before meals  insulin lispro (HumaLOG) corrective regimen sliding scale   SubCutaneous at bedtime  insulin lispro Injectable (HumaLOG) 16 Unit(s) SubCutaneous three times a day with meals  labetalol 300 milliGRAM(s) Oral two times a day  methylPREDNISolone sodium succinate Injectable 40 milliGRAM(s) IV Push two times a day  tiotropium 18 MICROgram(s) Capsule 1 Capsule(s) Inhalation daily    MEDICATIONS  (PRN):  dextrose 40% Gel 15 Gram(s) Oral once PRN Blood Glucose LESS THAN 70 milliGRAM(s)/deciliter  glucagon  Injectable 1 milliGRAM(s) IntraMuscular once PRN Glucose LESS THAN 70 milligrams/deciliter    Vital Signs Last 24 Hrs  T(C): 36.6 (07 Apr 2020 06:06), Max: 36.8 (06 Apr 2020 14:06)  T(F): 97.8 (07 Apr 2020 06:06), Max: 98.2 (06 Apr 2020 14:06)  HR: 80 (07 Apr 2020 06:06) (68 - 80)  BP: 152/90 (07 Apr 2020 06:06) (134/78 - 152/90)  BP(mean): --  RR: 19 (07 Apr 2020 06:06) (19 - 19)  SpO2: 94% (07 Apr 2020 06:06) (91% - 94%)    I&O's Summary        Physical Exam:   GENERAL: NAD, well-groomed, well-developed  HEENT: PABLITO/   Atraumatic, Normocephalic  ENMT: No tonsillar erythema, exudates, or enlargement; Moist mucous membranes, Good dentition, No lesions  NECK: Supple, No JVD, Normal thyroid  CHEST/LUNG: Clear to auscultaion, ; No rales, rhonchi, wheezing, or rubs  CVS: Regular rate and rhythm; No murmurs, rubs, or gallops  GI: : Soft, Nontender, Nondistended; Bowel sounds present  NERVOUS SYSTEM:  Alert & Oriented X3  EXTREMITIES:  2+ Peripheral Pulses, No clubbing, cyanosis, or edema  LYMPH: No lymphadenopathy noted  SKIN: No rashes or lesions  ENDOCRINOLOGY: No Thyromegaly  PSYCH: Appropriate    Labs:  22, 23, 23                            14.1   14.06 )-----------( 404      ( 06 Apr 2020 04:47 )             42.4                         13.5   14.01 )-----------( 408      ( 05 Apr 2020 06:10 )             40.2                         13.3   16.40 )-----------( 395      ( 04 Apr 2020 05:57 )             41.7     04-06    141  |  102  |  120<H>  ----------------------------<  156<H>  4.0   |  22  |  3.71<H>  04-05    137  |  98  |  129<H>  ----------------------------<  210<H>  4.0   |  21<L>  |  4.37<H>  04-04    136  |  96<L>  |  126<H>  ----------------------------<  319<H>  4.5   |  24  |  4.97<H>    Ca    9.1      06 Apr 2020 04:47  Phos  5.0     04-06  Mg     3.0     04-06    TPro  6.5  /  Alb  2.9<L>  /  TBili  0.2  /  DBili  x   /  AST  19  /  ALT  26  /  AlkPhos  72  04-06  TPro  6.0  /  Alb  2.6<L>  /  TBili  0.2  /  DBili  x   /  AST  22  /  ALT  31  /  AlkPhos  71  04-05  TPro  6.3  /  Alb  2.8<L>  /  TBili  0.2  /  DBili  x   /  AST  20  /  ALT  30  /  AlkPhos  77  04-04    CAPILLARY BLOOD GLUCOSE      POCT Blood Glucose.: 170 mg/dL (07 Apr 2020 11:35)  POCT Blood Glucose.: 107 mg/dL (07 Apr 2020 08:10)  POCT Blood Glucose.: 150 mg/dL (06 Apr 2020 22:30)  POCT Blood Glucose.: 170 mg/dL (06 Apr 2020 16:26)      LIVER FUNCTIONS - ( 06 Apr 2020 04:47 )  Alb: 2.9 g/dL / Pro: 6.5 g/dL / ALK PHOS: 72 u/L / ALT: 26 u/L / AST: 19 u/L / GGT: x               D-Dimer Assay, Quantitative: 486 ng/mL (04-05 @ 18:23)  D-Dimer Assay, Quantitative: 393 ng/mL (04-04 @ 15:19)        RECENT CULTURES:< from: CT Chest No Cont (04.02.20 @ 11:17) >  UNGS AND AIRWAYS: Patent central airways.  Evidence of bilateral predominantly groundglass opacity infiltrates with crazy paving. Differential includes but is not limited to atypical pneumonia. Bronchiectasis involving the lower lobes bilaterally, right more than left.    < end of copied text >          RESPIRATORY CULTURES:          Studies  Chest X-RAY  CT SCAN Chest   Venous Dopplers: LE:   CT Abdomen  Others

## 2020-04-07 NOTE — PROGRESS NOTE ADULT - ASSESSMENT
· Assessment		  66yo M with HTN, T2DM on insulin, HLD, copd presents with shortness of breath Admitted w/ COVID pneumonia      CoV id infection.  c/w plaquenil  steroids over today / anakinra /with dec in o2 requirement noted  - Monitor resp status and pulse ox.   pulm f/u noted    - Isolation  - Tylenol prn for fever and pleuritic chest pain.       ·  Problem: COPD (chronic obstructive pulmonary disease).   pulm f/u noted    ·  Problem: NANCY (acute kidney injury)  f/u renal fxn   renal f/u      ·  Problem: Other congestive heart failure.     appears euvolemic        ·  Problem: Type 2 diabetes mellitus with hyperglycemia, with long-term current use of insulin  fsg elevated  endo eval noted  insulin adjusted      Problem: Essential hypertension. c/w meds as per cards  - Hold lisinopril given NANCY.

## 2020-04-07 NOTE — PROGRESS NOTE ADULT - SUBJECTIVE AND OBJECTIVE BOX
CARDIOLOGY FOLLOW UP - Dr. South    CC no acute events   ddimer noted  ferritin noted to be elevated but trending down     PHYSICAL EXAM:  T(C): 36.6 (04-07-20 @ 06:06), Max: 36.8 (04-06-20 @ 14:06)  HR: 80 (04-07-20 @ 06:06) (68 - 80)  BP: 152/90 (04-07-20 @ 06:06) (134/78 - 152/90)  RR: 19 (04-07-20 @ 06:06) (19 - 19)  SpO2: 94% (04-07-20 @ 06:06) (86% - 94%)  Wt(kg): --  I&O's Summary      MEDICATIONS  (STANDING):  ALBUTerol    90 MICROgram(s) HFA Inhaler 2 Puff(s) Inhalation every 6 hours  amLODIPine   Tablet 10 milliGRAM(s) Oral daily  anakinra Injectable 100 milliGRAM(s) SubCutaneous every 12 hours  aspirin  chewable 81 milliGRAM(s) Oral daily  budesonide 160 MICROgram(s)/formoterol 4.5 MICROgram(s) Inhaler 2 Puff(s) Inhalation two times a day  cloNIDine 0.2 milliGRAM(s) Oral two times a day  dextrose 5%. 1000 milliLiter(s) (50 mL/Hr) IV Continuous <Continuous>  dextrose 50% Injectable 12.5 Gram(s) IV Push once  dextrose 50% Injectable 25 Gram(s) IV Push once  dextrose 50% Injectable 25 Gram(s) IV Push once  heparin  Injectable 5000 Unit(s) SubCutaneous every 8 hours  insulin glargine Injectable (LANTUS) 72 Unit(s) SubCutaneous at bedtime  insulin lispro (HumaLOG) corrective regimen sliding scale   SubCutaneous three times a day before meals  insulin lispro (HumaLOG) corrective regimen sliding scale   SubCutaneous at bedtime  insulin lispro Injectable (HumaLOG) 16 Unit(s) SubCutaneous three times a day with meals  labetalol 300 milliGRAM(s) Oral two times a day  methylPREDNISolone sodium succinate Injectable 40 milliGRAM(s) IV Push two times a day  tiotropium 18 MICROgram(s) Capsule 1 Capsule(s) Inhalation daily      TELEMETRY: 	    ECG:  	  RADIOLOGY:   DIAGNOSTIC TESTING:  [ ] Echocardiogram:  [ ]  Catheterization:  [ ] Stress Test:    OTHER: 	    LABS:	 	                                14.1   14.06 )-----------( 404      ( 06 Apr 2020 04:47 )             42.4     04-06    141  |  102  |  120<H>  ----------------------------<  156<H>  4.0   |  22  |  3.71<H>    Ca    9.1      06 Apr 2020 04:47  Phos  5.0     04-06  Mg     3.0     04-06    TPro  6.5  /  Alb  2.9<L>  /  TBili  0.2  /  DBili  x   /  AST  19  /  ALT  26  /  AlkPhos  72  04-06

## 2020-04-07 NOTE — PROGRESS NOTE ADULT - SUBJECTIVE AND OBJECTIVE BOX
CHIEF COMPLAINT:Patient is a 65y old  Male who presents with a chief complaint of pneumonia, resp failure (07 Apr 2020 14:02)    	        PAST MEDICAL & SURGICAL HISTORY:  Heart murmur: recent dx  Cholelithiasis  COPD (chronic obstructive pulmonary disease)  Diabetes: fs this am 120  Hypertension  S/P exploratory laparotomy: 23 years ago          REVIEW OF SYSTEMS:  feels better  CARDIOVASCULAR: No chest pain, palpitations, passing out, dizziness, or leg swelling  GASTROINTESTINAL: No abdominal or epigastric pain. No nausea, vomiting, or hematemesis; No diarrhea or constipation. No melena or hematochezia.  GENITOURINARY: No dysuria, frequency, hematuria, or incontinence  NEUROLOGICAL: No headaches,     Medications:  MEDICATIONS  (STANDING):  ALBUTerol    90 MICROgram(s) HFA Inhaler 2 Puff(s) Inhalation every 6 hours  amLODIPine   Tablet 10 milliGRAM(s) Oral daily  anakinra Injectable 100 milliGRAM(s) SubCutaneous every 12 hours  aspirin  chewable 81 milliGRAM(s) Oral daily  budesonide 160 MICROgram(s)/formoterol 4.5 MICROgram(s) Inhaler 2 Puff(s) Inhalation two times a day  cloNIDine 0.2 milliGRAM(s) Oral two times a day  dextrose 5%. 1000 milliLiter(s) (50 mL/Hr) IV Continuous <Continuous>  dextrose 50% Injectable 12.5 Gram(s) IV Push once  dextrose 50% Injectable 25 Gram(s) IV Push once  dextrose 50% Injectable 25 Gram(s) IV Push once  heparin  Injectable 5000 Unit(s) SubCutaneous every 8 hours  insulin glargine Injectable (LANTUS) 72 Unit(s) SubCutaneous at bedtime  insulin lispro (HumaLOG) corrective regimen sliding scale   SubCutaneous three times a day before meals  insulin lispro (HumaLOG) corrective regimen sliding scale   SubCutaneous at bedtime  insulin lispro Injectable (HumaLOG) 16 Unit(s) SubCutaneous three times a day with meals  labetalol 300 milliGRAM(s) Oral two times a day  tiotropium 18 MICROgram(s) Capsule 1 Capsule(s) Inhalation daily    MEDICATIONS  (PRN):  dextrose 40% Gel 15 Gram(s) Oral once PRN Blood Glucose LESS THAN 70 milliGRAM(s)/deciliter  glucagon  Injectable 1 milliGRAM(s) IntraMuscular once PRN Glucose LESS THAN 70 milligrams/deciliter    	    PHYSICAL EXAM:  T(C): 36.8 (04-07-20 @ 14:18), Max: 36.8 (04-07-20 @ 14:18)  HR: 67 (04-07-20 @ 14:18) (67 - 80)  BP: 137/79 (04-07-20 @ 14:18) (137/79 - 152/90)  RR: 19 (04-07-20 @ 14:18) (19 - 19)  SpO2: 95% (04-07-20 @ 14:18) (91% - 95%)  Wt(kg): --  I&O's Summary      Appearance: Normal	  HEENT:   Normal oral mucosa, PERRL, EOMI	  Lymphatic: No lymphadenopathy  Cardiovascular: Normal S1 S2, No JVD, No murmurs, No edema  Respiratory: Lungs clear to auscultation	  Psychiatry: A & O x 3, Mood & affect appropriate  Gastrointestinal:  Soft, Non-tender, + BS	  Skin: No rashes, No ecchymoses, No cyanosis	  Neurologic: Non-focal  Extremities: Normal range of motion, No clubbing, cyanosis or edema  Vascular: Peripheral pulses palpable 2+ bilaterally    TELEMETRY: 	    ECG:  	  RADIOLOGY:  OTHER: 	  	  LABS:	 	    CARDIAC MARKERS:                                14.1   14.06 )-----------( 404      ( 06 Apr 2020 04:47 )             42.4     04-06    141  |  102  |  120<H>  ----------------------------<  156<H>  4.0   |  22  |  3.71<H>    Ca    9.1      06 Apr 2020 04:47  Phos  5.0     04-06  Mg     3.0     04-06    TPro  6.5  /  Alb  2.9<L>  /  TBili  0.2  /  DBili  x   /  AST  19  /  ALT  26  /  AlkPhos  72  04-06    proBNP:   Lipid Profile:   HgA1c:   TSH:

## 2020-04-07 NOTE — CHART NOTE - NSCHARTNOTEFT_GEN_A_CORE
Attempted to call patient on his cell phone but goes to voicemail. Called RN [facility] 4 station twice but no answer. Unable to contact primary team, Dr. Pepe. Page sent for him.  Reviewed hospital notes in chart. Patient on Lantus 72 units QHS, Humalog 16 units TID AC and mod scales.  this morning and 170 in the afternoon. Last dose of Solumedrol was this morning at 7am. Patient is noted to have a cons carb diet, unclear if eating. As per hospital orders, recommended to wean to NC.    T2DM  - Solumedrol was 40 mg BID in the last few days while requiring high doses of insulin  - Last dose of Solumedrol was 04/07/20 at 7am  - Recommend reduce Lantus to 55 units QHS  - Recommend reduce Humalog to 8 units TID AC  - Recommend continue Humalog mod SSI AC and HS  - Discharge plan to be determined after speaking with primary team or patient as home regimen is unknown (possibly Metformin and Toujeo 60 units as per md rec)    Discussed plan with Dr. Pepe and Delfin, primary team providers    Lanny Nunes DO  Pager 9AM-5PM: [phone]  Pager Nights and Weekends: [phone]

## 2020-04-07 NOTE — PROGRESS NOTE ADULT - ASSESSMENT
64yo M with HTN, T2DM on insulin, HLD, copd presents with shortness of breath x 1 day. Admitted for suspected COVID pneumonia vs. CHF exacerbation.    COVID PNEUMONIA    d4 of plaquenil  d3 of anakinra  day 3 of solumedrol:  on 3 L of nasal cannula    crp is decreasing:   follow up d d crow as wellas ferritin tomorrow: he has high ferritin:  LFTS normal   4/6:  d5 of laquenil: and steorids: dc after today :  d4 of anakinra: from tomorrow am: finished qid dosing today   crp is down:     4/7:   on 3 L of nasal cannula: \not obviously sob: dc steroids:  cont anakinra: d5 of bid dosing:   D5 dvt prophyalxis:   blood glcuse is co ntrolled:   if he comes off oxygen/l then dcplanning:          DM    monitor and control per PMD as on high dose steoird s  4/6: dc steorids after today   HTN  CHF   NANCY

## 2020-04-08 LAB
ALBUMIN SERPL ELPH-MCNC: 3 G/DL — LOW (ref 3.3–5)
ALP SERPL-CCNC: 70 U/L — SIGNIFICANT CHANGE UP (ref 40–120)
ALT FLD-CCNC: 47 U/L — HIGH (ref 4–41)
ANION GAP SERPL CALC-SCNC: 12 MMO/L — SIGNIFICANT CHANGE UP (ref 7–14)
AST SERPL-CCNC: 36 U/L — SIGNIFICANT CHANGE UP (ref 4–40)
BILIRUB SERPL-MCNC: 0.3 MG/DL — SIGNIFICANT CHANGE UP (ref 0.2–1.2)
BUN SERPL-MCNC: 97 MG/DL — HIGH (ref 7–23)
CALCIUM SERPL-MCNC: 9.3 MG/DL — SIGNIFICANT CHANGE UP (ref 8.4–10.5)
CHLORIDE SERPL-SCNC: 108 MMOL/L — HIGH (ref 98–107)
CO2 SERPL-SCNC: 23 MMOL/L — SIGNIFICANT CHANGE UP (ref 22–31)
CREAT SERPL-MCNC: 2.59 MG/DL — HIGH (ref 0.5–1.3)
CRP SERPL-MCNC: 6.4 MG/L — HIGH
D DIMER BLD IA.RAPID-MCNC: 514 NG/ML — SIGNIFICANT CHANGE UP
FERRITIN SERPL-MCNC: 628.4 NG/ML — HIGH (ref 30–400)
GLUCOSE BLDC GLUCOMTR-MCNC: 155 MG/DL — HIGH (ref 70–99)
GLUCOSE BLDC GLUCOMTR-MCNC: 172 MG/DL — HIGH (ref 70–99)
GLUCOSE BLDC GLUCOMTR-MCNC: 70 MG/DL — SIGNIFICANT CHANGE UP (ref 70–99)
GLUCOSE BLDC GLUCOMTR-MCNC: 71 MG/DL — SIGNIFICANT CHANGE UP (ref 70–99)
GLUCOSE BLDC GLUCOMTR-MCNC: 98 MG/DL — SIGNIFICANT CHANGE UP (ref 70–99)
GLUCOSE SERPL-MCNC: 74 MG/DL — SIGNIFICANT CHANGE UP (ref 70–99)
POTASSIUM SERPL-MCNC: 4.2 MMOL/L — SIGNIFICANT CHANGE UP (ref 3.5–5.3)
POTASSIUM SERPL-SCNC: 4.2 MMOL/L — SIGNIFICANT CHANGE UP (ref 3.5–5.3)
PROT SERPL-MCNC: 6.4 G/DL — SIGNIFICANT CHANGE UP (ref 6–8.3)
SODIUM SERPL-SCNC: 143 MMOL/L — SIGNIFICANT CHANGE UP (ref 135–145)

## 2020-04-08 PROCEDURE — 99232 SBSQ HOSP IP/OBS MODERATE 35: CPT | Mod: GC

## 2020-04-08 RX ORDER — INSULIN GLARGINE 100 [IU]/ML
30 INJECTION, SOLUTION SUBCUTANEOUS AT BEDTIME
Refills: 0 | Status: DISCONTINUED | OUTPATIENT
Start: 2020-04-08 | End: 2020-04-09

## 2020-04-08 RX ORDER — INSULIN LISPRO 100/ML
4 VIAL (ML) SUBCUTANEOUS
Refills: 0 | Status: DISCONTINUED | OUTPATIENT
Start: 2020-04-08 | End: 2020-04-09

## 2020-04-08 RX ORDER — INSULIN LISPRO 100/ML
VIAL (ML) SUBCUTANEOUS AT BEDTIME
Refills: 0 | Status: DISCONTINUED | OUTPATIENT
Start: 2020-04-08 | End: 2020-04-12

## 2020-04-08 RX ORDER — INSULIN LISPRO 100/ML
VIAL (ML) SUBCUTANEOUS
Refills: 0 | Status: DISCONTINUED | OUTPATIENT
Start: 2020-04-08 | End: 2020-04-12

## 2020-04-08 RX ADMIN — HEPARIN SODIUM 5000 UNIT(S): 5000 INJECTION INTRAVENOUS; SUBCUTANEOUS at 06:09

## 2020-04-08 RX ADMIN — Medication 0.2 MILLIGRAM(S): at 17:53

## 2020-04-08 RX ADMIN — BUDESONIDE AND FORMOTEROL FUMARATE DIHYDRATE 2 PUFF(S): 160; 4.5 AEROSOL RESPIRATORY (INHALATION) at 23:20

## 2020-04-08 RX ADMIN — Medication 300 MILLIGRAM(S): at 06:09

## 2020-04-08 RX ADMIN — HEPARIN SODIUM 5000 UNIT(S): 5000 INJECTION INTRAVENOUS; SUBCUTANEOUS at 11:15

## 2020-04-08 RX ADMIN — Medication 81 MILLIGRAM(S): at 11:15

## 2020-04-08 RX ADMIN — AMLODIPINE BESYLATE 10 MILLIGRAM(S): 2.5 TABLET ORAL at 06:09

## 2020-04-08 RX ADMIN — ALBUTEROL 2 PUFF(S): 90 AEROSOL, METERED ORAL at 17:51

## 2020-04-08 RX ADMIN — TIOTROPIUM BROMIDE 1 CAPSULE(S): 18 CAPSULE ORAL; RESPIRATORY (INHALATION) at 11:14

## 2020-04-08 RX ADMIN — Medication 0.2 MILLIGRAM(S): at 06:09

## 2020-04-08 RX ADMIN — Medication 100 MILLIGRAM(S): at 17:53

## 2020-04-08 RX ADMIN — Medication 4 UNIT(S): at 12:48

## 2020-04-08 RX ADMIN — ALBUTEROL 2 PUFF(S): 90 AEROSOL, METERED ORAL at 23:19

## 2020-04-08 RX ADMIN — INSULIN GLARGINE 30 UNIT(S): 100 INJECTION, SOLUTION SUBCUTANEOUS at 23:19

## 2020-04-08 RX ADMIN — BUDESONIDE AND FORMOTEROL FUMARATE DIHYDRATE 2 PUFF(S): 160; 4.5 AEROSOL RESPIRATORY (INHALATION) at 09:43

## 2020-04-08 RX ADMIN — Medication 4 UNIT(S): at 19:01

## 2020-04-08 RX ADMIN — HEPARIN SODIUM 5000 UNIT(S): 5000 INJECTION INTRAVENOUS; SUBCUTANEOUS at 23:18

## 2020-04-08 RX ADMIN — ALBUTEROL 2 PUFF(S): 90 AEROSOL, METERED ORAL at 09:43

## 2020-04-08 RX ADMIN — ALBUTEROL 2 PUFF(S): 90 AEROSOL, METERED ORAL at 04:05

## 2020-04-08 RX ADMIN — Medication 300 MILLIGRAM(S): at 17:54

## 2020-04-08 RX ADMIN — Medication 100 MILLIGRAM(S): at 06:09

## 2020-04-08 NOTE — PROGRESS NOTE ADULT - SUBJECTIVE AND OBJECTIVE BOX
Patient is a 65y old  Male who presents with a chief complaint of pneumonia, resp failure (08 Apr 2020 11:03)      Any change in ROS:     MEDICATIONS  (STANDING):  ALBUTerol    90 MICROgram(s) HFA Inhaler 2 Puff(s) Inhalation every 6 hours  amLODIPine   Tablet 10 milliGRAM(s) Oral daily  anakinra Injectable 100 milliGRAM(s) SubCutaneous every 12 hours  aspirin  chewable 81 milliGRAM(s) Oral daily  budesonide 160 MICROgram(s)/formoterol 4.5 MICROgram(s) Inhaler 2 Puff(s) Inhalation two times a day  cloNIDine 0.2 milliGRAM(s) Oral two times a day  dextrose 5%. 1000 milliLiter(s) (50 mL/Hr) IV Continuous <Continuous>  dextrose 50% Injectable 12.5 Gram(s) IV Push once  dextrose 50% Injectable 25 Gram(s) IV Push once  dextrose 50% Injectable 25 Gram(s) IV Push once  heparin  Injectable 5000 Unit(s) SubCutaneous every 8 hours  insulin glargine Injectable (LANTUS) 30 Unit(s) SubCutaneous at bedtime  insulin lispro (HumaLOG) corrective regimen sliding scale   SubCutaneous three times a day before meals  insulin lispro (HumaLOG) corrective regimen sliding scale   SubCutaneous at bedtime  insulin lispro Injectable (HumaLOG) 4 Unit(s) SubCutaneous three times a day with meals  labetalol 300 milliGRAM(s) Oral two times a day  tiotropium 18 MICROgram(s) Capsule 1 Capsule(s) Inhalation daily    MEDICATIONS  (PRN):  dextrose 40% Gel 15 Gram(s) Oral once PRN Blood Glucose LESS THAN 70 milliGRAM(s)/deciliter  glucagon  Injectable 1 milliGRAM(s) IntraMuscular once PRN Glucose LESS THAN 70 milligrams/deciliter    Vital Signs Last 24 Hrs  T(C): 36.6 (08 Apr 2020 06:08), Max: 36.8 (07 Apr 2020 14:18)  T(F): 97.9 (08 Apr 2020 06:08), Max: 98.2 (07 Apr 2020 14:18)  HR: 66 (08 Apr 2020 06:08) (62 - 67)  BP: 140/67 (08 Apr 2020 06:08) (124/68 - 140/67)  BP(mean): --  RR: 19 (08 Apr 2020 06:08) (18 - 19)  SpO2: 93% (08 Apr 2020 06:08) (93% - 96%)    I&O's Summary    07 Apr 2020 07:01  -  08 Apr 2020 07:00  --------------------------------------------------------  IN: 0 mL / OUT: 2150 mL / NET: -2150 mL          Physical Exam:   GENERAL: NAD, well-groomed, well-developed  HEENT: PABLITO/   Atraumatic, Normocephalic  ENMT: No tonsillar erythema, exudates, or enlargement; Moist mucous membranes, Good dentition, No lesions  NECK: Supple, No JVD, Normal thyroid  CHEST/LUNG: Clear to auscultaion, ; No rales, rhonchi, wheezing, or rubs  CVS: Regular rate and rhythm; No murmurs, rubs, or gallops  GI: : Soft, Nontender, Nondistended; Bowel sounds present  NERVOUS SYSTEM:  Alert & Oriented X3, Good concentration; Motor Strength 5/5 B/L upper and lower extremities; DTRs 2+ intact and symmetric  EXTREMITIES:  2+ Peripheral Pulses, No clubbing, cyanosis, or edema  LYMPH: No lymphadenopathy noted  SKIN: No rashes or lesions  ENDOCRINOLOGY: No Thyromegaly  PSYCH: Appropriate    Labs:  22, 23, 23                            14.1   14.06 )-----------( 404      ( 06 Apr 2020 04:47 )             42.4                         13.5   14.01 )-----------( 408      ( 05 Apr 2020 06:10 )             40.2     04-08    143  |  108<H>  |  97<H>  ----------------------------<  74  4.2   |  23  |  2.59<H>  04-06    141  |  102  |  120<H>  ----------------------------<  156<H>  4.0   |  22  |  3.71<H>  04-05    137  |  98  |  129<H>  ----------------------------<  210<H>  4.0   |  21<L>  |  4.37<H>    Ca    9.3      08 Apr 2020 06:00    TPro  6.4  /  Alb  3.0<L>  /  TBili  0.3  /  DBili  x   /  AST  36  /  ALT  47<H>  /  AlkPhos  70  04-08  TPro  6.5  /  Alb  2.9<L>  /  TBili  0.2  /  DBili  x   /  AST  19  /  ALT  26  /  AlkPhos  72  04-06  TPro  6.0  /  Alb  2.6<L>  /  TBili  0.2  /  DBili  x   /  AST  22  /  ALT  31  /  AlkPhos  71  04-05    CAPILLARY BLOOD GLUCOSE      POCT Blood Glucose.: 155 mg/dL (08 Apr 2020 12:23)  POCT Blood Glucose.: 71 mg/dL (08 Apr 2020 06:57)  POCT Blood Glucose.: 70 mg/dL (08 Apr 2020 06:55)  POCT Blood Glucose.: 93 mg/dL (07 Apr 2020 21:40)  POCT Blood Glucose.: 91 mg/dL (07 Apr 2020 17:27)      LIVER FUNCTIONS - ( 08 Apr 2020 06:00 )  Alb: 3.0 g/dL / Pro: 6.4 g/dL / ALK PHOS: 70 u/L / ALT: 47 u/L / AST: 36 u/L / GGT: x               D-Dimer Assay, Quantitative: 486 ng/mL (04-05 @ 18:23)  D-Dimer Assay, Quantitative: 393 ng/mL (04-04 @ 15:19)      < from: CT Chest No Cont (04.02.20 @ 11:17) >    LUNGS AND AIRWAYS: Patent central airways.  Evidence of bilateral predominantly groundglass opacity infiltrates with crazy paving. Differential includes but is not limited to atypical pneumonia. Bronchiectasis involving the lower lobes bilaterally, right more than left.    PLEURA: No pleural effusion.    MEDIASTINUM AND MARYA: Mild mediastinal lymphadenopathy, slightly progressed.    VESSELS: Coronary artery calcification. Stable enlarged main pulmonary artery measuring approximately 3.4 cm. Stable dilatation of the ascending thoracic aorta measuring 4.1 cm.    HEART: Mild cardiomegaly. No pericardial effusion.    CHEST WALL AND LOWER NECK: Left thyroid nodules with substernal extension, similar to the prior study. No tracheal compression.    VISUALIZED UPPER ABDOMEN: Prior cholecystectomy. Surgical clips related to the right hepatic lobe.    BONES: Within normal limits.    IMPRESSION:     Bilateral predominantly groundglass opacity infiltrates without associated pleural effusions. Differential includes but is not limited to atypical pneumonia. Correlate clinically.    Additional findings as above.    < end of copied text >    RECENT CULTURES:        RESPIRATORY CULTURES:          Studies  Chest X-RAY  CT SCAN Chest   Venous Dopplers: LE:   CT Abdomen  Others

## 2020-04-08 NOTE — PROGRESS NOTE ADULT - SUBJECTIVE AND OBJECTIVE BOX
CARDIOLOGY FOLLOW UP - Dr. South    CC no acute events       PHYSICAL EXAM:  T(C): 36.6 (04-08-20 @ 06:08), Max: 36.8 (04-07-20 @ 14:18)  HR: 66 (04-08-20 @ 06:08) (62 - 67)  BP: 140/67 (04-08-20 @ 06:08) (124/68 - 140/67)  RR: 19 (04-08-20 @ 06:08) (18 - 19)  SpO2: 93% (04-08-20 @ 06:08) (93% - 96%)  Wt(kg): --  I&O's Summary    07 Apr 2020 07:01  -  08 Apr 2020 07:00  --------------------------------------------------------  IN: 0 mL / OUT: 2150 mL / NET: -2150 mL              MEDICATIONS  (STANDING):  ALBUTerol    90 MICROgram(s) HFA Inhaler 2 Puff(s) Inhalation every 6 hours  amLODIPine   Tablet 10 milliGRAM(s) Oral daily  anakinra Injectable 100 milliGRAM(s) SubCutaneous every 12 hours  aspirin  chewable 81 milliGRAM(s) Oral daily  budesonide 160 MICROgram(s)/formoterol 4.5 MICROgram(s) Inhaler 2 Puff(s) Inhalation two times a day  cloNIDine 0.2 milliGRAM(s) Oral two times a day  dextrose 5%. 1000 milliLiter(s) (50 mL/Hr) IV Continuous <Continuous>  dextrose 50% Injectable 12.5 Gram(s) IV Push once  dextrose 50% Injectable 25 Gram(s) IV Push once  dextrose 50% Injectable 25 Gram(s) IV Push once  heparin  Injectable 5000 Unit(s) SubCutaneous every 8 hours  insulin glargine Injectable (LANTUS) 55 Unit(s) SubCutaneous at bedtime  insulin lispro (HumaLOG) corrective regimen sliding scale   SubCutaneous three times a day before meals  insulin lispro (HumaLOG) corrective regimen sliding scale   SubCutaneous at bedtime  insulin lispro Injectable (HumaLOG) 8 Unit(s) SubCutaneous three times a day with meals  labetalol 300 milliGRAM(s) Oral two times a day  tiotropium 18 MICROgram(s) Capsule 1 Capsule(s) Inhalation daily      TELEMETRY: 	    ECG:  	  RADIOLOGY:   DIAGNOSTIC TESTING:  [ ] Echocardiogram:  [ ]  Catheterization:  [ ] Stress Test:    OTHER: 	    LABS:	 	    Troponin T, High Sensitivity: 45 ng/L [<6 - 14] (04-02 @ 06:04)  Troponin T, High Sensitivity: 56 ng/L [<6 - 14] (04-01 @ 22:20)      04-08    143  |  108<H>  |  97<H>  ----------------------------<  74  4.2   |  23  |  2.59<H>    Ca    9.3      08 Apr 2020 06:00    TPro  6.4  /  Alb  3.0<L>  /  TBili  0.3  /  DBili  x   /  AST  36  /  ALT  47<H>  /  AlkPhos  70  04-08

## 2020-04-08 NOTE — CHART NOTE - NSCHARTNOTEFT_GEN_A_CORE
64yo M with HTN, T2DM on insulin, HLD, COPD presents with shortness of breath x 1 day. Admitted for suspected COVID pneumonia vs. CHF exacerbation. Endocrine consulted for diabetes mellitus with severe exacerbation due to steroid dosing, now off steroids since 4/7 AM and running tightly controlled    Patient noted with BG trending in 70s this AM. Lantus dose was held last night, given BG tightly controlled to 93 mg/dl  Last dose of Solumedrol 40 mg IVP BID given yesterday at 6 AM  Spoke with primary team, patient has been weaned to NC, diet is ordered & patient eating  Recommend decreasing Lantus to 30 units SQ qHS (patient's home dose is reported as 60, however, current state of steroids now requiring less, also patient was not on any rapid acting insulin at home- took Metformin)   **IF BG trending below 100 mg/dl tonight at bedtime, please further reduce Lantus dose- can consider reduction by 20-30%  Decrease Humalog to 4 units SQ TID before meals (Hold if NPO/not eating meal)   Decrease Humalog correctional scales to LOW before meals and bedtime  Consistent carbohydrate diet   Given borderline hypo, ok with liberalized BG target today, 100-200 mg/dl   Reviewed with primary team   To follow, endocrine can be reached at [phone]    CAPILLARY BLOOD GLUCOSE    POCT Blood Glucose.: 71 mg/dL (08 Apr 2020 06:57)  POCT Blood Glucose.: 70 mg/dL (08 Apr 2020 06:55)  POCT Blood Glucose.: 93 mg/dL (07 Apr 2020 21:40)  POCT Blood Glucose.: 91 mg/dL (07 Apr 2020 17:27)    MEDICATIONS  (STANDING):  ALBUTerol    90 MICROgram(s) HFA Inhaler 2 Puff(s) Inhalation every 6 hours  amLODIPine   Tablet 10 milliGRAM(s) Oral daily  anakinra Injectable 100 milliGRAM(s) SubCutaneous every 12 hours  aspirin  chewable 81 milliGRAM(s) Oral daily  budesonide 160 MICROgram(s)/formoterol 4.5 MICROgram(s) Inhaler 2 Puff(s) Inhalation two times a day  cloNIDine 0.2 milliGRAM(s) Oral two times a day  dextrose 5%. 1000 milliLiter(s) (50 mL/Hr) IV Continuous <Continuous>  dextrose 50% Injectable 12.5 Gram(s) IV Push once  dextrose 50% Injectable 25 Gram(s) IV Push once  dextrose 50% Injectable 25 Gram(s) IV Push once  heparin  Injectable 5000 Unit(s) SubCutaneous every 8 hours  insulin glargine Injectable (LANTUS) 55 Unit(s) SubCutaneous at bedtime  insulin lispro (HumaLOG) corrective regimen sliding scale   SubCutaneous three times a day before meals  insulin lispro (HumaLOG) corrective regimen sliding scale   SubCutaneous at bedtime  insulin lispro Injectable (HumaLOG) 8 Unit(s) SubCutaneous three times a day with meals  labetalol 300 milliGRAM(s) Oral two times a day  tiotropium 18 MICROgram(s) Capsule 1 Capsule(s) Inhalation daily    Hemoglobin A1C, Whole Blood: 8.9 % <H> [4.0 - 5.6] (04-02-20 @ 17:00)  Hemoglobin A1C, Whole Blood: 8.7 % <H> [4.0 - 5.6] (04-02-20 @ 14:15)

## 2020-04-08 NOTE — PROGRESS NOTE ADULT - SUBJECTIVE AND OBJECTIVE BOX
[facility] DIVISION OF KIDNEY DISEASES AND HYPERTENSION -- FOLLOW UP NOTE  --------------------------------------------------------------------------------  HPI: 66yo M with HTN, T2DM on insulin, HLD, COPD presented to the ED with shortness of breath, admitted for COVID-19. Nephrology consulted for NANCY. Patient denies any personal or family history of kidney disease. Of note, patient was on Lasix and Lisinopril (as per prescription writer). [facility] reviewed, last outpatient Scr was 1.24 on 6/28/18. On arrival to the ER (4/1/20), Scr was 4.88, peaked to 4.97 on 4/4/20, and today has improved to 2.59.    PAST HISTORY  --------------------------------------------------------------------------------  No significant changes to PMH, PSH, FHx, SHx, unless otherwise noted    ALLERGIES & MEDICATIONS  --------------------------------------------------------------------------------  Allergies    No Known Allergies    Intolerances      Standing Inpatient Medications  ALBUTerol    90 MICROgram(s) HFA Inhaler 2 Puff(s) Inhalation every 6 hours  amLODIPine   Tablet 10 milliGRAM(s) Oral daily  anakinra Injectable 100 milliGRAM(s) SubCutaneous every 12 hours  aspirin  chewable 81 milliGRAM(s) Oral daily  budesonide 160 MICROgram(s)/formoterol 4.5 MICROgram(s) Inhaler 2 Puff(s) Inhalation two times a day  cloNIDine 0.2 milliGRAM(s) Oral two times a day  dextrose 5%. 1000 milliLiter(s) IV Continuous <Continuous>  dextrose 50% Injectable 12.5 Gram(s) IV Push once  dextrose 50% Injectable 25 Gram(s) IV Push once  dextrose 50% Injectable 25 Gram(s) IV Push once  heparin  Injectable 5000 Unit(s) SubCutaneous every 8 hours  insulin glargine Injectable (LANTUS) 55 Unit(s) SubCutaneous at bedtime  insulin lispro (HumaLOG) corrective regimen sliding scale   SubCutaneous three times a day before meals  insulin lispro (HumaLOG) corrective regimen sliding scale   SubCutaneous at bedtime  insulin lispro Injectable (HumaLOG) 8 Unit(s) SubCutaneous three times a day with meals  labetalol 300 milliGRAM(s) Oral two times a day  tiotropium 18 MICROgram(s) Capsule 1 Capsule(s) Inhalation daily    REVIEW OF SYSTEMS  --------------------------------------------------------------------------------  Gen: no lethargy  Respiratory: No dyspnea  CV: No chest pain  GI: No abdominal pain  MSK: trace LE edema  Neuro: No dizziness  Heme: No bleeding    All other systems were reviewed and are negative, except as noted.    VITALS/PHYSICAL EXAM  --------------------------------------------------------------------------------  T(C): 36.6 (04-08-20 @ 06:08), Max: 36.8 (04-07-20 @ 14:18)  HR: 66 (04-08-20 @ 06:08) (62 - 67)  BP: 140/67 (04-08-20 @ 06:08) (124/68 - 140/67)  RR: 19 (04-08-20 @ 06:08) (18 - 19)  SpO2: 93% (04-08-20 @ 06:08) (93% - 96%)  Wt(kg): --    04-07-20 @ 07:01  -  04-08-20 @ 07:00  --------------------------------------------------------  IN: 0 mL / OUT: 2150 mL / NET: -2150 mL    Physical Exam:  	Gen: NAD, on room air  	HEENT: MMM  	Pulm: CTA B/L  	CV: S1S2  	Abd: Soft, +BS   	Ext: No LE edema B/L  	Neuro: Awake  	Skin: Warm and dry	    LABS/STUDIES  --------------------------------------------------------------------------------    143  |  108  |  97  ----------------------------<  74      [04-08-20 @ 06:00]  4.2   |  23  |  2.59        Ca     9.3     [04-08-20 @ 06:00]    TPro  6.4  /  Alb  3.0  /  TBili  0.3  /  DBili  x   /  AST  36  /  ALT  47  /  AlkPhos  70  [04-08-20 @ 06:00]    Creatinine Trend:  SCr 2.59 [04-08 @ 06:00]  SCr 3.71 [04-06 @ 04:47]  SCr 4.37 [04-05 @ 06:10]  SCr 4.97 [04-04 @ 05:57]  SCr 4.66 [04-03 @ 08:50]    Urine Sodium < 20      [04-02-20 @ 17:25]  Urine Potassium 46.1      [04-02-20 @ 17:25]  Urine Chloride < 20      [04-02-20 @ 17:25]    Ferritin 533.2      [04-05-20 @ 18:23]  HbA1c 8.9      [04-02-20 @ 17:00]    HBsAg NEGATIVE      [04-02-20 @ 17:00]  HCV 0.27, Nonreactive Hepatitis C AB    C3 Complement 187.5      [04-02-20 @ 17:00]  C4 Complement 59.1      [04-02-20 @ 17:00]

## 2020-04-08 NOTE — PROGRESS NOTE ADULT - PROBLEM SELECTOR PLAN 1
Pt. with likely hemodynamically mediated NANCY in the setting of COVID-19, ACE-i, and diuretics. Last outpatient Scr was 1.24 on 6/28/18. On arrival to the ER (4/1/20), Scr was 4.88, peaked to 4.97 on 4/4/20 and has since improved to 2.59 today. UA significant for proteinuria, Skinny consistent with pre-renal. US Kidneys negative for hydronephrosis. Pt. with likely ATN. Continue to hold diuretics and ACE-i in NANCY, can re-start as outpatient. Monitor for signs of fluid overload. Encourage PO intake. Monitor labs and urine output. Avoid potential nephrotoxins.  Will sign off patient at this time. Please re-consult as needed.  If any questions, please feel free to contact me  Mark Villagomez   Nephrology Fellow  [phone]  (After 5 pm or on weekends please page the on-call fellow)

## 2020-04-08 NOTE — PROGRESS NOTE ADULT - ASSESSMENT
ECHO  5/10/19 minimial MR, moderate bi-atrial enlargement, nl LV sys fx, ef 67%, mod diastolic dysfx stage 2    a/p  65 year old male with hx Hypertension, Diabetes Mellitus, Aortic Regurgitation, Diastolic Heart Failure, CBD Stone, s/p Stent, PAD  shortness of breath x 1 day. Admitted for suspected COVID pneumonia vs. CHF exacerbation.    1. SOB; + covid infection, covid associated PNA  -Covid + 4/2   -ct chest with bl predominatly grounglass opacity infiltrates without associated pleural effusions ? atypical pna   -s/p plaquenil, on anakinra   -ID / med f/u   -02 supplementation as needed    2.  Chronic Diastolic CHF   -stable, s/p 40 mg IVP lasix x1; volume status stable  -avoid further lasix for now given vik, acute covid infection , creat improving   -monitor for fluid overloaded   -c/w bb   -repeat ECHO if feasible or bedside POCUS if pt deteriorates to eval LV function and AR     3. Aortic Regurgitation  -cv stable     dvt ppx

## 2020-04-08 NOTE — PROGRESS NOTE ADULT - ASSESSMENT
· Assessment		  66yo M with HTN, T2DM on insulin, HLD, copd presents with shortness of breath Admitted w/ COVID pneumonia      CoV id infection.  c/w plaquenil / anakinra /with dec in o2 requirement noted  - Monitor resp status and pulse ox.   pulm f/u noted    - Isolation  - Tylenol prn for fever and pleuritic chest pain.       ·  Problem: COPD (chronic obstructive pulmonary disease).   pulm f/u noted    ·  Problem: NANCY (acute kidney injury) better  f/u renal fxn noted  renal f/u      ·  Problem: Other congestive heart failure.     appears euvolemic        ·  Problem: Type 2 diabetes mellitus with hyperglycemia, with long-term current use of insulin  fsg elevated  endo eval noted  insulin adjusted      Problem: Essential hypertension. c/w meds as per cards  - Hold lisinopril given NANCY.

## 2020-04-08 NOTE — PROGRESS NOTE ADULT - ASSESSMENT
66yo M with HTN, T2DM on insulin, HLD, copd presents with shortness of breath x 1 day. Admitted for suspected COVID pneumonia vs. CHF exacerbation.    COVID PNEUMONIA    d4 of plaquenil  d3 of anakinra  day 3 of solumedrol:  on 3 L of nasal cannula    crp is decreasing:   follow up d leena maria as wellas ferritin tomorrow: he has high ferritin:  LFTS normal   :  d5 of laquenil: and steorids: dc after today :  d4 of anakinra: from tomorrow am: finished qid dosing today   crp is down:     :   on 3 L of nasal cannula: \not obviously sob: dc steroids:  cont anakinra: d5 of bid dosing:   D5 dvt prophyalxis:   blood glcuse is co ntrolled:   if he comes off oxygen/l then dcplannin/8:  d6 of anakinra dosing: bid: from tomorrow: change to once a day  cont dvt rpopahxylis  on 3 L of oxygen:   monitor d leena maria:   his symptoms are better:       DM    monitor and control per PMD as on high dose steoird s  : dc steorids after today   HTN  CHF   NANCY

## 2020-04-08 NOTE — PROGRESS NOTE ADULT - SUBJECTIVE AND OBJECTIVE BOX
CHIEF COMPLAINT:Patient is a 65y old  Male who presents with a chief complaint of pneumonia, resp failure (08 Apr 2020 13:59)    	        PAST MEDICAL & SURGICAL HISTORY:  Heart murmur: recent dx  Cholelithiasis  COPD (chronic obstructive pulmonary disease)  Diabetes: fs this am 120  Hypertension  S/P exploratory laparotomy: 23 years ago          REVIEW OF SYSTEMS:  feels better dec sob  CARDIOVASCULAR: No chest pain, palpitations, passing out, dizziness, or leg swelling  GASTROINTESTINAL: No abdominal or epigastric pain. No nausea, vomiting, or hematemesis; No diarrhea or constipation. No melena or hematochezia.  GENITOURINARY: No dysuria, frequency, hematuria, or incontinence  NEUROLOGICAL: No headaches, memory loss,      Medications:  MEDICATIONS  (STANDING):  ALBUTerol    90 MICROgram(s) HFA Inhaler 2 Puff(s) Inhalation every 6 hours  amLODIPine   Tablet 10 milliGRAM(s) Oral daily  anakinra Injectable 100 milliGRAM(s) SubCutaneous every 12 hours  aspirin  chewable 81 milliGRAM(s) Oral daily  budesonide 160 MICROgram(s)/formoterol 4.5 MICROgram(s) Inhaler 2 Puff(s) Inhalation two times a day  cloNIDine 0.2 milliGRAM(s) Oral two times a day  dextrose 5%. 1000 milliLiter(s) (50 mL/Hr) IV Continuous <Continuous>  dextrose 50% Injectable 12.5 Gram(s) IV Push once  dextrose 50% Injectable 25 Gram(s) IV Push once  dextrose 50% Injectable 25 Gram(s) IV Push once  heparin  Injectable 5000 Unit(s) SubCutaneous every 8 hours  insulin glargine Injectable (LANTUS) 30 Unit(s) SubCutaneous at bedtime  insulin lispro (HumaLOG) corrective regimen sliding scale   SubCutaneous three times a day before meals  insulin lispro (HumaLOG) corrective regimen sliding scale   SubCutaneous at bedtime  insulin lispro Injectable (HumaLOG) 4 Unit(s) SubCutaneous three times a day with meals  labetalol 300 milliGRAM(s) Oral two times a day  tiotropium 18 MICROgram(s) Capsule 1 Capsule(s) Inhalation daily    MEDICATIONS  (PRN):  dextrose 40% Gel 15 Gram(s) Oral once PRN Blood Glucose LESS THAN 70 milliGRAM(s)/deciliter  glucagon  Injectable 1 milliGRAM(s) IntraMuscular once PRN Glucose LESS THAN 70 milligrams/deciliter    	    PHYSICAL EXAM:  T(C): 36.6 (04-08-20 @ 06:08), Max: 36.8 (04-07-20 @ 14:18)  HR: 66 (04-08-20 @ 06:08) (62 - 67)  BP: 140/67 (04-08-20 @ 06:08) (124/68 - 140/67)  RR: 19 (04-08-20 @ 06:08) (18 - 19)  SpO2: 93% (04-08-20 @ 06:08) (93% - 96%)  Wt(kg): --  I&O's Summary    07 Apr 2020 07:01  -  08 Apr 2020 07:00  --------------------------------------------------------  IN: 0 mL / OUT: 2150 mL / NET: -2150 mL        Appearance: Normal	  HEENT:   Normal oral mucosa, PERRL, EOMI	  Lymphatic: No lymphadenopathy  Cardiovascular: Normal S1 S2, No JVD, No murmurs, No edema  Respiratory: Lungs clear to auscultation	  Psychiatry: A & O x 3, Mood & affect appropriate  Gastrointestinal:  Soft, Non-tender, + BS	  Skin: No rashes, No ecchymoses, No cyanosis	  Neurologic: Non-focal  Extremities: Normal range of motion, No clubbing, cyanosis or edema  Vascular: Peripheral pulses palpable 2+ bilaterally    TELEMETRY: 	    ECG:  	  RADIOLOGY:  OTHER: 	  	  LABS:	 	    CARDIAC MARKERS:            04-08    143  |  108<H>  |  97<H>  ----------------------------<  74  4.2   |  23  |  2.59<H>    Ca    9.3      08 Apr 2020 06:00    TPro  6.4  /  Alb  3.0<L>  /  TBili  0.3  /  DBili  x   /  AST  36  /  ALT  47<H>  /  AlkPhos  70  04-08    proBNP:   Lipid Profile:   HgA1c:   TSH:

## 2020-04-09 LAB
ANION GAP SERPL CALC-SCNC: 12 MMO/L — SIGNIFICANT CHANGE UP (ref 7–14)
BUN SERPL-MCNC: 76 MG/DL — HIGH (ref 7–23)
CALCIUM SERPL-MCNC: 9.4 MG/DL — SIGNIFICANT CHANGE UP (ref 8.4–10.5)
CHLORIDE SERPL-SCNC: 109 MMOL/L — HIGH (ref 98–107)
CO2 SERPL-SCNC: 25 MMOL/L — SIGNIFICANT CHANGE UP (ref 22–31)
CREAT SERPL-MCNC: 2.51 MG/DL — HIGH (ref 0.5–1.3)
GLUCOSE BLDC GLUCOMTR-MCNC: 114 MG/DL — HIGH (ref 70–99)
GLUCOSE BLDC GLUCOMTR-MCNC: 140 MG/DL — HIGH (ref 70–99)
GLUCOSE BLDC GLUCOMTR-MCNC: 87 MG/DL — SIGNIFICANT CHANGE UP (ref 70–99)
GLUCOSE BLDC GLUCOMTR-MCNC: 97 MG/DL — SIGNIFICANT CHANGE UP (ref 70–99)
GLUCOSE SERPL-MCNC: 97 MG/DL — SIGNIFICANT CHANGE UP (ref 70–99)
MAGNESIUM SERPL-MCNC: 2.3 MG/DL — SIGNIFICANT CHANGE UP (ref 1.6–2.6)
PHOSPHATE SERPL-MCNC: 5.2 MG/DL — HIGH (ref 2.5–4.5)
POTASSIUM SERPL-MCNC: 4.6 MMOL/L — SIGNIFICANT CHANGE UP (ref 3.5–5.3)
POTASSIUM SERPL-SCNC: 4.6 MMOL/L — SIGNIFICANT CHANGE UP (ref 3.5–5.3)
SODIUM SERPL-SCNC: 146 MMOL/L — HIGH (ref 135–145)

## 2020-04-09 RX ORDER — INSULIN GLARGINE 100 [IU]/ML
20 INJECTION, SOLUTION SUBCUTANEOUS AT BEDTIME
Refills: 0 | Status: DISCONTINUED | OUTPATIENT
Start: 2020-04-09 | End: 2020-04-12

## 2020-04-09 RX ORDER — INSULIN LISPRO 100/ML
2 VIAL (ML) SUBCUTANEOUS
Refills: 0 | Status: DISCONTINUED | OUTPATIENT
Start: 2020-04-09 | End: 2020-04-10

## 2020-04-09 RX ORDER — ANAKINRA 100MG/0.67
100 SYRINGE (ML) SUBCUTANEOUS DAILY
Refills: 0 | Status: COMPLETED | OUTPATIENT
Start: 2020-04-10 | End: 2020-04-12

## 2020-04-09 RX ADMIN — Medication 100 MILLIGRAM(S): at 05:51

## 2020-04-09 RX ADMIN — HEPARIN SODIUM 5000 UNIT(S): 5000 INJECTION INTRAVENOUS; SUBCUTANEOUS at 05:51

## 2020-04-09 RX ADMIN — HEPARIN SODIUM 5000 UNIT(S): 5000 INJECTION INTRAVENOUS; SUBCUTANEOUS at 22:59

## 2020-04-09 RX ADMIN — Medication 4 UNIT(S): at 13:07

## 2020-04-09 RX ADMIN — Medication 300 MILLIGRAM(S): at 05:52

## 2020-04-09 RX ADMIN — Medication 0.2 MILLIGRAM(S): at 16:24

## 2020-04-09 RX ADMIN — TIOTROPIUM BROMIDE 1 CAPSULE(S): 18 CAPSULE ORAL; RESPIRATORY (INHALATION) at 09:28

## 2020-04-09 RX ADMIN — Medication 300 MILLIGRAM(S): at 16:25

## 2020-04-09 RX ADMIN — Medication 81 MILLIGRAM(S): at 11:52

## 2020-04-09 RX ADMIN — INSULIN GLARGINE 20 UNIT(S): 100 INJECTION, SOLUTION SUBCUTANEOUS at 22:59

## 2020-04-09 RX ADMIN — ALBUTEROL 2 PUFF(S): 90 AEROSOL, METERED ORAL at 09:28

## 2020-04-09 RX ADMIN — BUDESONIDE AND FORMOTEROL FUMARATE DIHYDRATE 2 PUFF(S): 160; 4.5 AEROSOL RESPIRATORY (INHALATION) at 09:28

## 2020-04-09 RX ADMIN — ALBUTEROL 2 PUFF(S): 90 AEROSOL, METERED ORAL at 05:52

## 2020-04-09 RX ADMIN — AMLODIPINE BESYLATE 10 MILLIGRAM(S): 2.5 TABLET ORAL at 05:51

## 2020-04-09 RX ADMIN — Medication 100 MILLIGRAM(S): at 17:11

## 2020-04-09 RX ADMIN — Medication 0.2 MILLIGRAM(S): at 05:51

## 2020-04-09 RX ADMIN — Medication 2 UNIT(S): at 17:10

## 2020-04-09 RX ADMIN — ALBUTEROL 2 PUFF(S): 90 AEROSOL, METERED ORAL at 17:11

## 2020-04-09 RX ADMIN — HEPARIN SODIUM 5000 UNIT(S): 5000 INJECTION INTRAVENOUS; SUBCUTANEOUS at 13:07

## 2020-04-09 RX ADMIN — Medication 4 UNIT(S): at 09:26

## 2020-04-09 NOTE — PROGRESS NOTE ADULT - SUBJECTIVE AND OBJECTIVE BOX
CARDIOLOGY FOLLOW UP - Dr. South    CC no acute events   now on2 L NC       PHYSICAL EXAM:  T(C): 36.6 (04-09-20 @ 05:30), Max: 36.6 (04-08-20 @ 22:30)  HR: 74 (04-09-20 @ 05:30) (70 - 74)  BP: 170/84 (04-09-20 @ 05:30) (160/88 - 170/84)  RR: 16 (04-09-20 @ 09:58) (16 - 19)  SpO2: 92% (04-09-20 @ 09:58) (92% - 94%)  Wt(kg): --  I&O's Summary    08 Apr 2020 07:01  -  09 Apr 2020 07:00  --------------------------------------------------------  IN: 880 mL / OUT: 1350 mL / NET: -470 mL            MEDICATIONS  (STANDING):  ALBUTerol    90 MICROgram(s) HFA Inhaler 2 Puff(s) Inhalation every 6 hours  amLODIPine   Tablet 10 milliGRAM(s) Oral daily  anakinra Injectable 100 milliGRAM(s) SubCutaneous every 12 hours  aspirin  chewable 81 milliGRAM(s) Oral daily  budesonide 160 MICROgram(s)/formoterol 4.5 MICROgram(s) Inhaler 2 Puff(s) Inhalation two times a day  cloNIDine 0.2 milliGRAM(s) Oral two times a day  dextrose 5%. 1000 milliLiter(s) (50 mL/Hr) IV Continuous <Continuous>  dextrose 50% Injectable 12.5 Gram(s) IV Push once  dextrose 50% Injectable 25 Gram(s) IV Push once  dextrose 50% Injectable 25 Gram(s) IV Push once  heparin  Injectable 5000 Unit(s) SubCutaneous every 8 hours  insulin glargine Injectable (LANTUS) 30 Unit(s) SubCutaneous at bedtime  insulin lispro (HumaLOG) corrective regimen sliding scale   SubCutaneous three times a day before meals  insulin lispro (HumaLOG) corrective regimen sliding scale   SubCutaneous at bedtime  insulin lispro Injectable (HumaLOG) 4 Unit(s) SubCutaneous three times a day with meals  labetalol 300 milliGRAM(s) Oral two times a day  tiotropium 18 MICROgram(s) Capsule 1 Capsule(s) Inhalation daily      TELEMETRY: 	    ECG:  	  RADIOLOGY:   DIAGNOSTIC TESTING:  [ ] Echocardiogram:  [ ]  Catheterization:  [ ] Stress Test:    OTHER: 	    LABS:	 	        04-09    146<H>  |  109<H>  |  76<H>  ----------------------------<  97  4.6   |  25  |  2.51<H>    Ca    9.4      09 Apr 2020 09:15  Phos  5.2     04-09  Mg     2.3     04-09    TPro  6.4  /  Alb  3.0<L>  /  TBili  0.3  /  DBili  x   /  AST  36  /  ALT  47<H>  /  AlkPhos  70  04-08

## 2020-04-09 NOTE — PROGRESS NOTE ADULT - SUBJECTIVE AND OBJECTIVE BOX
Patient is a 65y old  Male who presents with a chief complaint of pneumonia, resp failure (09 Apr 2020 11:46)      Any change in ROS:     MEDICATIONS  (STANDING):  ALBUTerol    90 MICROgram(s) HFA Inhaler 2 Puff(s) Inhalation every 6 hours  amLODIPine   Tablet 10 milliGRAM(s) Oral daily  anakinra Injectable 100 milliGRAM(s) SubCutaneous every 12 hours  aspirin  chewable 81 milliGRAM(s) Oral daily  budesonide 160 MICROgram(s)/formoterol 4.5 MICROgram(s) Inhaler 2 Puff(s) Inhalation two times a day  cloNIDine 0.2 milliGRAM(s) Oral two times a day  dextrose 5%. 1000 milliLiter(s) (50 mL/Hr) IV Continuous <Continuous>  dextrose 50% Injectable 12.5 Gram(s) IV Push once  dextrose 50% Injectable 25 Gram(s) IV Push once  dextrose 50% Injectable 25 Gram(s) IV Push once  heparin  Injectable 5000 Unit(s) SubCutaneous every 8 hours  insulin glargine Injectable (LANTUS) 30 Unit(s) SubCutaneous at bedtime  insulin lispro (HumaLOG) corrective regimen sliding scale   SubCutaneous three times a day before meals  insulin lispro (HumaLOG) corrective regimen sliding scale   SubCutaneous at bedtime  insulin lispro Injectable (HumaLOG) 4 Unit(s) SubCutaneous three times a day with meals  labetalol 300 milliGRAM(s) Oral two times a day  tiotropium 18 MICROgram(s) Capsule 1 Capsule(s) Inhalation daily    MEDICATIONS  (PRN):  dextrose 40% Gel 15 Gram(s) Oral once PRN Blood Glucose LESS THAN 70 milliGRAM(s)/deciliter  glucagon  Injectable 1 milliGRAM(s) IntraMuscular once PRN Glucose LESS THAN 70 milligrams/deciliter    Vital Signs Last 24 Hrs  T(C): 36.6 (09 Apr 2020 05:30), Max: 36.6 (08 Apr 2020 22:30)  T(F): 97.9 (09 Apr 2020 05:30), Max: 97.9 (09 Apr 2020 05:30)  HR: 74 (09 Apr 2020 05:30) (70 - 74)  BP: 170/84 (09 Apr 2020 05:30) (160/88 - 170/84)  BP(mean): --  RR: 16 (09 Apr 2020 09:58) (16 - 19)  SpO2: 92% (09 Apr 2020 09:58) (92% - 94%)    I&O's Summary    08 Apr 2020 07:01  -  09 Apr 2020 07:00  --------------------------------------------------------  IN: 880 mL / OUT: 1350 mL / NET: -470 mL          Physical Exam:   GENERAL: NAD, well-groomed, well-developed  HEENT: PABLITO/   Atraumatic, Normocephalic  ENMT: No tonsillar erythema, exudates, or enlargement; Moist mucous membranes, Good dentition, No lesions  NECK: Supple, No JVD, Normal thyroid  CHEST/LUNG: Clear to auscultaion, ; No rales, rhonchi, wheezing, or rubs  CVS: Regular rate and rhythm; No murmurs, rubs, or gallops  GI: : Soft, Nontender, Nondistended; Bowel sounds present  NERVOUS SYSTEM:  Alert & Oriented X3, Good concentration; Motor Strength 5/5 B/L upper and lower extremities; DTRs 2+ intact and symmetric  EXTREMITIES:  2+ Peripheral Pulses, No clubbing, cyanosis, or edema  LYMPH: No lymphadenopathy noted  SKIN: No rashes or lesions  ENDOCRINOLOGY: No Thyromegaly  PSYCH: Appropriate    Labs:                              14.1   14.06 )-----------( 404      ( 06 Apr 2020 04:47 )             42.4     04-09    146<H>  |  109<H>  |  76<H>  ----------------------------<  97  4.6   |  25  |  2.51<H>  04-08    143  |  108<H>  |  97<H>  ----------------------------<  74  4.2   |  23  |  2.59<H>  04-06    141  |  102  |  120<H>  ----------------------------<  156<H>  4.0   |  22  |  3.71<H>    Ca    9.4      09 Apr 2020 09:15  Ca    9.3      08 Apr 2020 06:00  Phos  5.2     04-09  Mg     2.3     04-09    TPro  6.4  /  Alb  3.0<L>  /  TBili  0.3  /  DBili  x   /  AST  36  /  ALT  47<H>  /  AlkPhos  70  04-08  TPro  6.5  /  Alb  2.9<L>  /  TBili  0.2  /  DBili  x   /  AST  19  /  ALT  26  /  AlkPhos  72  04-06    CAPILLARY BLOOD GLUCOSE      POCT Blood Glucose.: 114 mg/dL (09 Apr 2020 12:32)  POCT Blood Glucose.: 87 mg/dL (09 Apr 2020 08:41)  POCT Blood Glucose.: 172 mg/dL (08 Apr 2020 22:38)  POCT Blood Glucose.: 98 mg/dL (08 Apr 2020 18:59)      LIVER FUNCTIONS - ( 08 Apr 2020 06:00 )  Alb: 3.0 g/dL / Pro: 6.4 g/dL / ALK PHOS: 70 u/L / ALT: 47 u/L / AST: 36 u/L / GGT: x               D-Dimer Assay, Quantitative: 514 ng/mL (04-08 @ 19:00)  D-Dimer Assay, Quantitative: 486 ng/mL (04-05 @ 18:23)  D-Dimer Assay, Quantitative: 393 ng/mL (04-04 @ 15:19)        RECENT CULTURES:        RESPIRATORY CULTURES:          Studies  Chest X-RAY  CT SCAN Chest   Venous Dopplers: LE:   CT Abdomen  Others    < from: CT Chest No Cont (04.02.20 @ 11:17) >    VESSELS: Coronary artery calcification. Stable enlarged main pulmonary artery measuring approximately 3.4 cm. Stable dilatation of the ascending thoracic aorta measuring 4.1 cm.    HEART: Mild cardiomegaly. No pericardial effusion.    CHEST WALL AND LOWER NECK: Left thyroid nodules with substernal extension, similar to the prior study. No tracheal compression.    VISUALIZED UPPER ABDOMEN: Prior cholecystectomy. Surgical clips related to the right hepatic lobe.    BONES: Within normal limits.    IMPRESSION:     Bilateral predominantly groundglass opacity infiltrates without associated pleural effusions. Differential includes but is not limited to atypical pneumonia. Correlate clinically.    Additional findings as above.                          RADHA DURHAM M.D., ATTENDING RADIOLOGIST  This document has been electronically signed. Apr 2 2020 11:44AM        < end of copied text >

## 2020-04-09 NOTE — PROGRESS NOTE ADULT - ASSESSMENT
ECHO  5/10/19 minimial MR, moderate bi-atrial enlargement, nl LV sys fx, ef 67%, mod diastolic dysfx stage 2    a/p  65 year old male with hx Hypertension, Diabetes Mellitus, Aortic Regurgitation, Diastolic Heart Failure, CBD Stone, s/p Stent, PAD  shortness of breath x 1 day. Admitted for suspected COVID pneumonia vs. CHF exacerbation.    1. SOB; + covid infection, covid associated PNA  -Covid + 4/2   -ct chest with bl predominatly grounglass opacity infiltrates without associated pleural effusions ? atypical pna   -s/p plaquenil, on anakinra   -ID / med f/u   -02 supplementation as needed    2.  Chronic Diastolic CHF   -stable, s/p 40 mg IVP lasix x1; volume status stable  -avoid further lasix for now given nancy, acute covid infection , creat improving   -monitor for fluid overloaded   -c/w bb   -repeat ECHO if feasible or bedside POCUS if pt deteriorates to eval LV function and AR     3. Aortic Regurgitation  -cv stable     4. htn   -elevated bp reading noted   - acei on hold given NANCY , continue current meds  - can add hydral 25 mg BID   dvt ppx

## 2020-04-09 NOTE — CHART NOTE - NSCHARTNOTEFT_GEN_A_CORE
66yo M with HTN, T2DM on insulin, HLD, COPD presents with shortness of breath x 1 day. Admitted for suspected COVID pneumonia vs. CHF exacerbation. Endocrine consulted for diabetes mellitus with severe exacerbation due to steroid dosing, now off steroids since 4/7 AM and running tightly controlled    Patient noted with BG 87 mg/dl before breakfast (received Lantus 30 units last night)  Also with tightly controlled prandial values last evening  Recommend decreasing Lantus to 20 units SQ qHS for tonight  Decrease Humalog to 2 units SQ TID before meals (Hold if NPO/not eating meal)   Continue Humalog correctional scales LOW before meals and bedtime  Consistent carbohydrate diet   Inpatient BG target 100-180 mg/dl  Discharge Plan: Likely basal/oral based on inpatient requirements. Outpatient records indicate patient was on basal insulin pen (Toujeo) plus Metformin 1000 mg PO BID. However, GFR is in 30s today, will need to trend GFR to see if Metformin can be resumed at reduced dose. Also, would recommend reduction of basal insulin dose at discharge, given current requirement is significantly lower than recorded home dose (60 units).  To follow, endocrine can be reached at [phone]    CAPILLARY BLOOD GLUCOSE    POCT Blood Glucose.: 71 mg/dL (08 Apr 2020 06:57)  POCT Blood Glucose.: 70 mg/dL (08 Apr 2020 06:55)  POCT Blood Glucose.: 93 mg/dL (07 Apr 2020 21:40)  POCT Blood Glucose.: 91 mg/dL (07 Apr 2020 17:27)    MEDICATIONS  (STANDING):  ALBUTerol    90 MICROgram(s) HFA Inhaler 2 Puff(s) Inhalation every 6 hours  amLODIPine   Tablet 10 milliGRAM(s) Oral daily  anakinra Injectable 100 milliGRAM(s) SubCutaneous every 12 hours  aspirin  chewable 81 milliGRAM(s) Oral daily  budesonide 160 MICROgram(s)/formoterol 4.5 MICROgram(s) Inhaler 2 Puff(s) Inhalation two times a day  cloNIDine 0.2 milliGRAM(s) Oral two times a day  dextrose 5%. 1000 milliLiter(s) (50 mL/Hr) IV Continuous <Continuous>  dextrose 50% Injectable 12.5 Gram(s) IV Push once  dextrose 50% Injectable 25 Gram(s) IV Push once  dextrose 50% Injectable 25 Gram(s) IV Push once  heparin  Injectable 5000 Unit(s) SubCutaneous every 8 hours  insulin glargine Injectable (LANTUS) 55 Unit(s) SubCutaneous at bedtime  insulin lispro (HumaLOG) corrective regimen sliding scale   SubCutaneous three times a day before meals  insulin lispro (HumaLOG) corrective regimen sliding scale   SubCutaneous at bedtime  insulin lispro Injectable (HumaLOG) 8 Unit(s) SubCutaneous three times a day with meals  labetalol 300 milliGRAM(s) Oral two times a day  tiotropium 18 MICROgram(s) Capsule 1 Capsule(s) Inhalation daily    Hemoglobin A1C, Whole Blood: 8.9 % <H> [4.0 - 5.6] (04-02-20 @ 17:00)  Hemoglobin A1C, Whole Blood: 8.7 % <H> [4.0 - 5.6] (04-02-20 @ 14:15). 64yo M with HTN, T2DM on insulin, HLD, COPD presents with shortness of breath x 1 day. Admitted for suspected COVID pneumonia vs. CHF exacerbation. Endocrine consulted for diabetes mellitus with severe exacerbation due to steroid dosing, now off steroids since 4/7 AM and running tightly controlled    Patient noted with BG 87 mg/dl before breakfast (received Lantus 30 units last night)  Also with tightly controlled prandial values last evening  Recommend decreasing Lantus to 20 units SQ qHS for tonight  Decrease Humalog to 2 units SQ TID before meals (Hold if NPO/not eating meal)   Continue Humalog correctional scales LOW before meals and bedtime  Consistent carbohydrate diet   Inpatient BG target 100-180 mg/dl  Discharge Plan: Likely basal/oral based on inpatient requirements. Outpatient records indicate patient was on basal insulin pen (Toujeo) plus Metformin 1000 mg PO BID. However, GFR is in 30s today, will need to trend GFR to see if Metformin can be resumed at reduced dose. Also, would recommend reduction of basal insulin dose at discharge, given current requirement is significantly lower than recorded home dose (60 units).  To follow, endocrine can be reached at [phone]    CAPILLARY BLOOD GLUCOSE    POCT Blood Glucose.: 114 mg/dL (09 Apr 2020 12:32)  POCT Blood Glucose.: 87 mg/dL (09 Apr 2020 08:41)  POCT Blood Glucose.: 172 mg/dL (08 Apr 2020 22:38)  POCT Blood Glucose.: 98 mg/dL (08 Apr 2020 18:59)    MEDICATIONS  (STANDING):  ALBUTerol    90 MICROgram(s) HFA Inhaler 2 Puff(s) Inhalation every 6 hours  amLODIPine   Tablet 10 milliGRAM(s) Oral daily  anakinra Injectable 100 milliGRAM(s) SubCutaneous every 12 hours  aspirin  chewable 81 milliGRAM(s) Oral daily  budesonide 160 MICROgram(s)/formoterol 4.5 MICROgram(s) Inhaler 2 Puff(s) Inhalation two times a day  cloNIDine 0.2 milliGRAM(s) Oral two times a day  dextrose 5%. 1000 milliLiter(s) (50 mL/Hr) IV Continuous <Continuous>  dextrose 50% Injectable 12.5 Gram(s) IV Push once  dextrose 50% Injectable 25 Gram(s) IV Push once  dextrose 50% Injectable 25 Gram(s) IV Push once  heparin  Injectable 5000 Unit(s) SubCutaneous every 8 hours  insulin glargine Injectable (LANTUS) 30 Unit(s) SubCutaneous at bedtime  insulin lispro (HumaLOG) corrective regimen sliding scale   SubCutaneous three times a day before meals  insulin lispro (HumaLOG) corrective regimen sliding scale   SubCutaneous at bedtime  insulin lispro Injectable (HumaLOG) 4 Unit(s) SubCutaneous three times a day before meals  labetalol 300 milliGRAM(s) Oral two times a day  tiotropium 18 MICROgram(s) Capsule 1 Capsule(s) Inhalation daily      Hemoglobin A1C, Whole Blood: 8.9 % <H> [4.0 - 5.6] (04-02-20 @ 17:00)  Hemoglobin A1C, Whole Blood: 8.7 % <H> [4.0 - 5.6] (04-02-20 @ 14:15).

## 2020-04-09 NOTE — PROGRESS NOTE ADULT - ASSESSMENT
· Assessment		  64yo M with HTN, T2DM on insulin, HLD, copd presents with shortness of breath Admitted w/ COVID pneumonia      CoV id infection.  c/w plaquenil / anakinra /with dec in o2 requirement noted  - Monitor resp status and pulse ox.   pulm f/u noted    - Isolation  - Tylenol prn for fever and pleuritic chest pain.       ·  Problem: COPD (chronic obstructive pulmonary disease).   pulm f/u noted    ·  Problem: NANCY (acute kidney injury) stable   f/u renal fxn noted  renal f/u      ·  Problem: Other congestive heart failure.     appears euvolemic        ·  Problem: Type 2 diabetes mellitus with hyperglycemia, with long-term current use of insulin  fsg elevated  endo eval noted  insulin adjusted      Problem: Essential hypertension. c/w meds as per cards  - Hold lisinopril given NANCY.

## 2020-04-09 NOTE — PROGRESS NOTE ADULT - SUBJECTIVE AND OBJECTIVE BOX
CHIEF COMPLAINT:Patient is a 65y old  Male who presents with a chief complaint of pneumonia, resp failure (09 Apr 2020 11:28)    	        PAST MEDICAL & SURGICAL HISTORY:  Heart murmur: recent dx  Cholelithiasis  COPD (chronic obstructive pulmonary disease)  Diabetes: fs this am 120  Hypertension  S/P exploratory laparotomy: 23 years ago          REVIEW OF SYSTEMS:  dec sob  weak  CARDIOVASCULAR: No chest pain, palpitations, passing out, dizziness, or leg swelling  GASTROINTESTINAL: No abdominal or epigastric pain. No nausea, vomiting, or hematemesis; No diarrhea or constipation. No melena or hematochezia.  GENITOURINARY: No dysuria, frequency, hematuria, or incontinence  NEUROLOGICAL: No headaches, memory loss,     Medications:  MEDICATIONS  (STANDING):  ALBUTerol    90 MICROgram(s) HFA Inhaler 2 Puff(s) Inhalation every 6 hours  amLODIPine   Tablet 10 milliGRAM(s) Oral daily  anakinra Injectable 100 milliGRAM(s) SubCutaneous every 12 hours  aspirin  chewable 81 milliGRAM(s) Oral daily  budesonide 160 MICROgram(s)/formoterol 4.5 MICROgram(s) Inhaler 2 Puff(s) Inhalation two times a day  cloNIDine 0.2 milliGRAM(s) Oral two times a day  dextrose 5%. 1000 milliLiter(s) (50 mL/Hr) IV Continuous <Continuous>  dextrose 50% Injectable 12.5 Gram(s) IV Push once  dextrose 50% Injectable 25 Gram(s) IV Push once  dextrose 50% Injectable 25 Gram(s) IV Push once  heparin  Injectable 5000 Unit(s) SubCutaneous every 8 hours  insulin glargine Injectable (LANTUS) 30 Unit(s) SubCutaneous at bedtime  insulin lispro (HumaLOG) corrective regimen sliding scale   SubCutaneous three times a day before meals  insulin lispro (HumaLOG) corrective regimen sliding scale   SubCutaneous at bedtime  insulin lispro Injectable (HumaLOG) 4 Unit(s) SubCutaneous three times a day with meals  labetalol 300 milliGRAM(s) Oral two times a day  tiotropium 18 MICROgram(s) Capsule 1 Capsule(s) Inhalation daily    MEDICATIONS  (PRN):  dextrose 40% Gel 15 Gram(s) Oral once PRN Blood Glucose LESS THAN 70 milliGRAM(s)/deciliter  glucagon  Injectable 1 milliGRAM(s) IntraMuscular once PRN Glucose LESS THAN 70 milligrams/deciliter    	    PHYSICAL EXAM:  T(C): 36.6 (04-09-20 @ 05:30), Max: 36.6 (04-08-20 @ 22:30)  HR: 74 (04-09-20 @ 05:30) (70 - 74)  BP: 170/84 (04-09-20 @ 05:30) (160/88 - 170/84)  RR: 16 (04-09-20 @ 09:58) (16 - 19)  SpO2: 92% (04-09-20 @ 09:58) (92% - 94%)  Wt(kg): --  I&O's Summary    08 Apr 2020 07:01  -  09 Apr 2020 07:00  --------------------------------------------------------  IN: 880 mL / OUT: 1350 mL / NET: -470 mL        Appearance: Normal	  HEENT:   Normal oral mucosa, PERRL, EOMI	  Lymphatic: No lymphadenopathy  Cardiovascular: Normal S1 S2, No JVD, No murmurs, No edema  Respiratory: Lungs clear to auscultation	  Psychiatry: A & O x 3, Mood & affect appropriate  Gastrointestinal:  Soft, Non-tender, + BS	  Skin: No rashes, No ecchymoses, No cyanosis	  Neurologic: Non-focal  Extremities: Normal range of motion, No clubbing, cyanosis or edema  Vascular: Peripheral pulses palpable 2+ bilaterally    TELEMETRY: 	    ECG:  	  RADIOLOGY:  OTHER: 	  	  LABS:	 	    CARDIAC MARKERS:            04-09    146<H>  |  109<H>  |  76<H>  ----------------------------<  97  4.6   |  25  |  2.51<H>    Ca    9.4      09 Apr 2020 09:15  Phos  5.2     04-09  Mg     2.3     04-09    TPro  6.4  /  Alb  3.0<L>  /  TBili  0.3  /  DBili  x   /  AST  36  /  ALT  47<H>  /  AlkPhos  70  04-08    proBNP:   Lipid Profile:   HgA1c:   TSH:

## 2020-04-09 NOTE — PROGRESS NOTE ADULT - ASSESSMENT
64yo M with HTN, T2DM on insulin, HLD, copd presents with shortness of breath x 1 day. Admitted for suspected COVID pneumonia vs. CHF exacerbation.    COVID PNEUMONIA    d4 of plaquenil  d3 of anakinra  day 3 of solumedrol:  on 3 L of nasal cannula    crp is decreasing:   follow up d leena maria as wellas ferritin tomorrow: he has high ferritin:  LFTS normal   :  d5 of laquenil: and steorids: dc after today :  d4 of anakinra: from tomorrow am: finished qid dosing today   crp is down:     :   on 3 L of nasal cannula: \not obviously sob: dc steroids:  cont anakinra: d5 of bid dosing:   D5 dvt prophyalxis:   blood glcuse is co ntrolled:   if he comes off oxygen/l then dcplannin/8:  d6 of anakinra dosing: bid: from tomorrow: change to once a day  cont dvt rpopahxylis  on 3 L of oxygen:   monitor d leena maria:   his symptoms are better:     :   last day of q bid dosing of anakinra  off steroids as well as plaquenil: o2 : 2 L per minute: improved   crp is decreased; ferritin as wellas d dimer ismildly increased:   Cont symbicort for copd:   dvt prophaylxis   has ckd too: creat downtrending          DM  HTN  CHF   NANCY

## 2020-04-10 LAB
ANION GAP SERPL CALC-SCNC: 12 MMO/L — SIGNIFICANT CHANGE UP (ref 7–14)
BASOPHILS # BLD AUTO: 0.02 K/UL — SIGNIFICANT CHANGE UP (ref 0–0.2)
BASOPHILS NFR BLD AUTO: 0.2 % — SIGNIFICANT CHANGE UP (ref 0–2)
BUN SERPL-MCNC: 60 MG/DL — HIGH (ref 7–23)
CALCIUM SERPL-MCNC: 9.8 MG/DL — SIGNIFICANT CHANGE UP (ref 8.4–10.5)
CHLORIDE SERPL-SCNC: 106 MMOL/L — SIGNIFICANT CHANGE UP (ref 98–107)
CO2 SERPL-SCNC: 25 MMOL/L — SIGNIFICANT CHANGE UP (ref 22–31)
CREAT SERPL-MCNC: 2.16 MG/DL — HIGH (ref 0.5–1.3)
EOSINOPHIL # BLD AUTO: 0.13 K/UL — SIGNIFICANT CHANGE UP (ref 0–0.5)
EOSINOPHIL NFR BLD AUTO: 1.4 % — SIGNIFICANT CHANGE UP (ref 0–6)
GLUCOSE BLDC GLUCOMTR-MCNC: 127 MG/DL — HIGH (ref 70–99)
GLUCOSE BLDC GLUCOMTR-MCNC: 143 MG/DL — HIGH (ref 70–99)
GLUCOSE BLDC GLUCOMTR-MCNC: 92 MG/DL — SIGNIFICANT CHANGE UP (ref 70–99)
GLUCOSE SERPL-MCNC: 132 MG/DL — HIGH (ref 70–99)
HCT VFR BLD CALC: 49 % — SIGNIFICANT CHANGE UP (ref 39–50)
HGB BLD-MCNC: 15.7 G/DL — SIGNIFICANT CHANGE UP (ref 13–17)
IMM GRANULOCYTES NFR BLD AUTO: 2.1 % — HIGH (ref 0–1.5)
LYMPHOCYTES # BLD AUTO: 0.52 K/UL — LOW (ref 1–3.3)
LYMPHOCYTES # BLD AUTO: 5.5 % — LOW (ref 13–44)
MAGNESIUM SERPL-MCNC: 2.2 MG/DL — SIGNIFICANT CHANGE UP (ref 1.6–2.6)
MANUAL SMEAR VERIFICATION: SIGNIFICANT CHANGE UP
MCHC RBC-ENTMCNC: 28.3 PG — SIGNIFICANT CHANGE UP (ref 27–34)
MCHC RBC-ENTMCNC: 32 % — SIGNIFICANT CHANGE UP (ref 32–36)
MCV RBC AUTO: 88.4 FL — SIGNIFICANT CHANGE UP (ref 80–100)
MONOCYTES # BLD AUTO: 0.83 K/UL — SIGNIFICANT CHANGE UP (ref 0–0.9)
MONOCYTES NFR BLD AUTO: 8.8 % — SIGNIFICANT CHANGE UP (ref 2–14)
NEUTROPHILS # BLD AUTO: 7.77 K/UL — HIGH (ref 1.8–7.4)
NEUTROPHILS NFR BLD AUTO: 82 % — HIGH (ref 43–77)
NRBC # FLD: 0 K/UL — SIGNIFICANT CHANGE UP (ref 0–0)
PHOSPHATE SERPL-MCNC: 4.2 MG/DL — SIGNIFICANT CHANGE UP (ref 2.5–4.5)
PLATELET # BLD AUTO: 313 K/UL — SIGNIFICANT CHANGE UP (ref 150–400)
PMV BLD: 10.1 FL — SIGNIFICANT CHANGE UP (ref 7–13)
POTASSIUM SERPL-MCNC: 4.9 MMOL/L — SIGNIFICANT CHANGE UP (ref 3.5–5.3)
POTASSIUM SERPL-SCNC: 4.9 MMOL/L — SIGNIFICANT CHANGE UP (ref 3.5–5.3)
RBC # BLD: 5.54 M/UL — SIGNIFICANT CHANGE UP (ref 4.2–5.8)
RBC # FLD: 14.2 % — SIGNIFICANT CHANGE UP (ref 10.3–14.5)
SODIUM SERPL-SCNC: 143 MMOL/L — SIGNIFICANT CHANGE UP (ref 135–145)
WBC # BLD: 9.47 K/UL — SIGNIFICANT CHANGE UP (ref 3.8–10.5)
WBC # FLD AUTO: 9.47 K/UL — SIGNIFICANT CHANGE UP (ref 3.8–10.5)

## 2020-04-10 RX ORDER — HYDRALAZINE HCL 50 MG
25 TABLET ORAL
Refills: 0 | Status: DISCONTINUED | OUTPATIENT
Start: 2020-04-10 | End: 2020-04-12

## 2020-04-10 RX ADMIN — Medication 300 MILLIGRAM(S): at 17:26

## 2020-04-10 RX ADMIN — ALBUTEROL 2 PUFF(S): 90 AEROSOL, METERED ORAL at 22:33

## 2020-04-10 RX ADMIN — Medication 300 MILLIGRAM(S): at 06:29

## 2020-04-10 RX ADMIN — TIOTROPIUM BROMIDE 1 CAPSULE(S): 18 CAPSULE ORAL; RESPIRATORY (INHALATION) at 10:59

## 2020-04-10 RX ADMIN — BUDESONIDE AND FORMOTEROL FUMARATE DIHYDRATE 2 PUFF(S): 160; 4.5 AEROSOL RESPIRATORY (INHALATION) at 22:32

## 2020-04-10 RX ADMIN — BUDESONIDE AND FORMOTEROL FUMARATE DIHYDRATE 2 PUFF(S): 160; 4.5 AEROSOL RESPIRATORY (INHALATION) at 00:13

## 2020-04-10 RX ADMIN — ALBUTEROL 2 PUFF(S): 90 AEROSOL, METERED ORAL at 16:37

## 2020-04-10 RX ADMIN — Medication 0.2 MILLIGRAM(S): at 17:26

## 2020-04-10 RX ADMIN — INSULIN GLARGINE 20 UNIT(S): 100 INJECTION, SOLUTION SUBCUTANEOUS at 22:32

## 2020-04-10 RX ADMIN — ALBUTEROL 2 PUFF(S): 90 AEROSOL, METERED ORAL at 00:13

## 2020-04-10 RX ADMIN — ALBUTEROL 2 PUFF(S): 90 AEROSOL, METERED ORAL at 06:29

## 2020-04-10 RX ADMIN — AMLODIPINE BESYLATE 10 MILLIGRAM(S): 2.5 TABLET ORAL at 06:29

## 2020-04-10 RX ADMIN — HEPARIN SODIUM 5000 UNIT(S): 5000 INJECTION INTRAVENOUS; SUBCUTANEOUS at 13:58

## 2020-04-10 RX ADMIN — HEPARIN SODIUM 5000 UNIT(S): 5000 INJECTION INTRAVENOUS; SUBCUTANEOUS at 22:32

## 2020-04-10 RX ADMIN — ALBUTEROL 2 PUFF(S): 90 AEROSOL, METERED ORAL at 11:59

## 2020-04-10 RX ADMIN — Medication 81 MILLIGRAM(S): at 13:58

## 2020-04-10 RX ADMIN — Medication 0.2 MILLIGRAM(S): at 06:28

## 2020-04-10 RX ADMIN — BUDESONIDE AND FORMOTEROL FUMARATE DIHYDRATE 2 PUFF(S): 160; 4.5 AEROSOL RESPIRATORY (INHALATION) at 11:59

## 2020-04-10 RX ADMIN — Medication 100 MILLIGRAM(S): at 13:58

## 2020-04-10 RX ADMIN — HEPARIN SODIUM 5000 UNIT(S): 5000 INJECTION INTRAVENOUS; SUBCUTANEOUS at 06:29

## 2020-04-10 RX ADMIN — Medication 25 MILLIGRAM(S): at 17:26

## 2020-04-10 NOTE — CHART NOTE - NSCHARTNOTEFT_GEN_A_CORE
64yo M with HTN, T2DM on insulin, HLD, COPD presents with shortness of breath x 1 day. Admitted for suspected COVID pneumonia vs. CHF exacerbation. Endocrine consulted for diabetes mellitus with severe exacerbation due to steroid dosing, now off steroids since 4/7 AM and running tightly controlled    Patient noted with BG 92 mg/dl before lunch today. Did not receive Humalog pre-breakfast.  Fasting BG stable on serum at 132 mg/dl   Continue Lantus 20 units SQ qHS for tonight   Will STOP Humalog pre-meal for now given low requirements prandially   Continue Humalog correctional scales LOW before meals and bedtime  Consistent carbohydrate diet   Inpatient BG target 100-180 mg/dl  Discharge Plan: Likely basal/oral based on inpatient requirements. Outpatient records indicate patient was on basal insulin pen (Toujeo) plus Metformin 1000 mg PO BID. However, GFR is in 30s today, will need to trend GFR to see if Metformin can be resumed at reduced dose. Also, would recommend reduction of basal insulin dose at discharge, given current requirement is significantly lower than recorded home dose (60 units).  To follow    CAPILLARY BLOOD GLUCOSE    POCT Blood Glucose.: 92 mg/dL (10 Apr 2020 11:55)  POCT Blood Glucose.: 140 mg/dL (09 Apr 2020 22:28)  POCT Blood Glucose.: 97 mg/dL (09 Apr 2020 17:00)    MEDICATIONS  (STANDING):  ALBUTerol    90 MICROgram(s) HFA Inhaler 2 Puff(s) Inhalation every 6 hours  amLODIPine   Tablet 10 milliGRAM(s) Oral daily  anakinra Injectable 100 milliGRAM(s) SubCutaneous daily  aspirin  chewable 81 milliGRAM(s) Oral daily  budesonide 160 MICROgram(s)/formoterol 4.5 MICROgram(s) Inhaler 2 Puff(s) Inhalation two times a day  cloNIDine 0.2 milliGRAM(s) Oral two times a day  dextrose 5%. 1000 milliLiter(s) (50 mL/Hr) IV Continuous <Continuous>  dextrose 50% Injectable 12.5 Gram(s) IV Push once  dextrose 50% Injectable 25 Gram(s) IV Push once  dextrose 50% Injectable 25 Gram(s) IV Push once  heparin  Injectable 5000 Unit(s) SubCutaneous every 8 hours  hydrALAZINE 25 milliGRAM(s) Oral two times a day  insulin glargine Injectable (LANTUS) 20 Unit(s) SubCutaneous at bedtime  insulin lispro (HumaLOG) corrective regimen sliding scale   SubCutaneous three times a day before meals  insulin lispro (HumaLOG) corrective regimen sliding scale   SubCutaneous at bedtime  insulin lispro Injectable (HumaLOG) 2 Unit(s) SubCutaneous three times a day before meals  labetalol 300 milliGRAM(s) Oral two times a day  tiotropium 18 MICROgram(s) Capsule 1 Capsule(s) Inhalation daily      04-10    143  |  106  |  60<H>  ----------------------------<  132<H>  4.9   |  25  |  2.16<H>    Ca    9.8      10 Apr 2020 07:30  Phos  4.2     04-10  Mg     2.2     04-10        Hemoglobin A1C, Whole Blood: 8.9 % <H> [4.0 - 5.6] (04-02-20 @ 17:00)  Hemoglobin A1C, Whole Blood: 8.7 % <H> [4.0 - 5.6] (04-02-20 @ 14:15).      Endocrine can be contacted at [phone] Per current hospital medicine emergency protocol, in an effort to reduce COVID exposures and also conserve PPE for necessary encounters, H&P below is obtained from chart review, verbal discussion with patient, medical team and nursing staff if applicable, without direct patient contact unless acute changes.     66yo M with HTN, T2DM on insulin, HLD, COPD presents with shortness of breath x 1 day. Admitted for suspected COVID pneumonia vs. CHF exacerbation. Endocrine consulted for diabetes mellitus with severe exacerbation due to steroid dosing, now off steroids since 4/7 AM and running tightly controlled    Patient noted with BG 92 mg/dl before lunch today. Did not receive Humalog pre-breakfast.  Fasting BG stable on serum at 132 mg/dl   Continue Lantus 20 units SQ qHS for tonight   Will STOP Humalog pre-meal for now given low requirements prandially   Continue Humalog correctional scales LOW before meals and bedtime  Consistent carbohydrate diet   Inpatient BG target 100-180 mg/dl  Discharge Plan: Likely basal/oral based on inpatient requirements. Outpatient records indicate patient was on basal insulin pen (Toujeo) plus Metformin 1000 mg PO BID. However, GFR is in 30s today, will need to trend GFR to see if Metformin can be resumed at reduced dose. Also, would recommend reduction of basal insulin dose at discharge, given current requirement is significantly lower than recorded home dose (60 units).  To follow    CAPILLARY BLOOD GLUCOSE    POCT Blood Glucose.: 92 mg/dL (10 Apr 2020 11:55)  POCT Blood Glucose.: 140 mg/dL (09 Apr 2020 22:28)  POCT Blood Glucose.: 97 mg/dL (09 Apr 2020 17:00)    MEDICATIONS  (STANDING):  ALBUTerol    90 MICROgram(s) HFA Inhaler 2 Puff(s) Inhalation every 6 hours  amLODIPine   Tablet 10 milliGRAM(s) Oral daily  anakinra Injectable 100 milliGRAM(s) SubCutaneous daily  aspirin  chewable 81 milliGRAM(s) Oral daily  budesonide 160 MICROgram(s)/formoterol 4.5 MICROgram(s) Inhaler 2 Puff(s) Inhalation two times a day  cloNIDine 0.2 milliGRAM(s) Oral two times a day  dextrose 5%. 1000 milliLiter(s) (50 mL/Hr) IV Continuous <Continuous>  dextrose 50% Injectable 12.5 Gram(s) IV Push once  dextrose 50% Injectable 25 Gram(s) IV Push once  dextrose 50% Injectable 25 Gram(s) IV Push once  heparin  Injectable 5000 Unit(s) SubCutaneous every 8 hours  hydrALAZINE 25 milliGRAM(s) Oral two times a day  insulin glargine Injectable (LANTUS) 20 Unit(s) SubCutaneous at bedtime  insulin lispro (HumaLOG) corrective regimen sliding scale   SubCutaneous three times a day before meals  insulin lispro (HumaLOG) corrective regimen sliding scale   SubCutaneous at bedtime  insulin lispro Injectable (HumaLOG) 2 Unit(s) SubCutaneous three times a day before meals  labetalol 300 milliGRAM(s) Oral two times a day  tiotropium 18 MICROgram(s) Capsule 1 Capsule(s) Inhalation daily      04-10    143  |  106  |  60<H>  ----------------------------<  132<H>  4.9   |  25  |  2.16<H>    Ca    9.8      10 Apr 2020 07:30  Phos  4.2     04-10  Mg     2.2     04-10        Hemoglobin A1C, Whole Blood: 8.9 % <H> [4.0 - 5.6] (04-02-20 @ 17:00)  Hemoglobin A1C, Whole Blood: 8.7 % <H> [4.0 - 5.6] (04-02-20 @ 14:15).      Endocrine can be contacted at [phone]

## 2020-04-10 NOTE — PROGRESS NOTE ADULT - ASSESSMENT
ECHO  5/10/19 minimial MR, moderate bi-atrial enlargement, nl LV sys fx, ef 67%, mod diastolic dysfx stage 2    a/p  65 year old male with hx Hypertension, Diabetes Mellitus, Aortic Regurgitation, Diastolic Heart Failure, CBD Stone, s/p Stent, PAD  shortness of breath x 1 day. Admitted for suspected COVID pneumonia vs. CHF exacerbation.    1. SOB; + covid infection, covid associated PNA  -Covid + 4/2   -ct chest with bl predominatly grounglass opacity infiltrates without associated pleural effusions ? atypical pna   -s/p plaquenil, on anakinra   -ID / med f/u   -02 supplementation as needed    2.  Chronic Diastolic CHF   -stable, s/p 40 mg IVP lasix x1; volume status stable  -avoid further lasix for now given nancy, acute covid infection , creat improving   -monitor for fluid overloaded   -c/w bb   -repeat ECHO if feasible or bedside POCUS if pt deteriorates to eval LV function and AR     3. Aortic Regurgitation  -cv stable     4. htn   -elevated bp readings noted   - acei on hold given NANCY , continue current meds  - can add hydral 25 mg BID   dvt ppx

## 2020-04-10 NOTE — PROGRESS NOTE ADULT - SUBJECTIVE AND OBJECTIVE BOX
CARDIOLOGY FOLLOW UP - Dr. South    CC no acute events       PHYSICAL EXAM:  T(C): 36.3 (04-10-20 @ 06:27), Max: 36.9 (04-09-20 @ 22:30)  HR: 82 (04-10-20 @ 06:27) (64 - 82)  BP: 157/90 (04-10-20 @ 06:27) (128/70 - 173/96)  RR: 16 (04-10-20 @ 06:27) (16 - 22)  SpO2: 96% (04-10-20 @ 06:27) (82% - 96%)  Wt(kg): --  I&O's Summary    09 Apr 2020 07:01  -  10 Apr 2020 07:00  --------------------------------------------------------  IN: 0 mL / OUT: 1600 mL / NET: -1600 mL            MEDICATIONS  (STANDING):  ALBUTerol    90 MICROgram(s) HFA Inhaler 2 Puff(s) Inhalation every 6 hours  amLODIPine   Tablet 10 milliGRAM(s) Oral daily  anakinra Injectable 100 milliGRAM(s) SubCutaneous daily  aspirin  chewable 81 milliGRAM(s) Oral daily  budesonide 160 MICROgram(s)/formoterol 4.5 MICROgram(s) Inhaler 2 Puff(s) Inhalation two times a day  cloNIDine 0.2 milliGRAM(s) Oral two times a day  dextrose 5%. 1000 milliLiter(s) (50 mL/Hr) IV Continuous <Continuous>  dextrose 50% Injectable 12.5 Gram(s) IV Push once  dextrose 50% Injectable 25 Gram(s) IV Push once  dextrose 50% Injectable 25 Gram(s) IV Push once  heparin  Injectable 5000 Unit(s) SubCutaneous every 8 hours  insulin glargine Injectable (LANTUS) 20 Unit(s) SubCutaneous at bedtime  insulin lispro (HumaLOG) corrective regimen sliding scale   SubCutaneous three times a day before meals  insulin lispro (HumaLOG) corrective regimen sliding scale   SubCutaneous at bedtime  insulin lispro Injectable (HumaLOG) 2 Unit(s) SubCutaneous three times a day before meals  labetalol 300 milliGRAM(s) Oral two times a day  tiotropium 18 MICROgram(s) Capsule 1 Capsule(s) Inhalation daily      TELEMETRY: 	    ECG:  	  RADIOLOGY:   DIAGNOSTIC TESTING:  [ ] Echocardiogram:  [ ]  Catheterization:  [ ] Stress Test:    OTHER: 	    LABS:	 	                            15.7   9.47  )-----------( 313      ( 10 Apr 2020 07:20 )             49.0     04-10    143  |  106  |  60<H>  ----------------------------<  132<H>  4.9   |  25  |  2.16<H>    Ca    9.8      10 Apr 2020 07:30  Phos  4.2     04-10  Mg     2.2     04-10

## 2020-04-10 NOTE — PROGRESS NOTE ADULT - SUBJECTIVE AND OBJECTIVE BOX
Patient is a 65y old  Male who presents with a chief complaint of pneumonia, resp failure (10 Apr 2020 11:10)      Any change in ROS:     MEDICATIONS  (STANDING):  ALBUTerol    90 MICROgram(s) HFA Inhaler 2 Puff(s) Inhalation every 6 hours  amLODIPine   Tablet 10 milliGRAM(s) Oral daily  anakinra Injectable 100 milliGRAM(s) SubCutaneous daily  aspirin  chewable 81 milliGRAM(s) Oral daily  budesonide 160 MICROgram(s)/formoterol 4.5 MICROgram(s) Inhaler 2 Puff(s) Inhalation two times a day  cloNIDine 0.2 milliGRAM(s) Oral two times a day  dextrose 5%. 1000 milliLiter(s) (50 mL/Hr) IV Continuous <Continuous>  dextrose 50% Injectable 12.5 Gram(s) IV Push once  dextrose 50% Injectable 25 Gram(s) IV Push once  dextrose 50% Injectable 25 Gram(s) IV Push once  heparin  Injectable 5000 Unit(s) SubCutaneous every 8 hours  hydrALAZINE 25 milliGRAM(s) Oral two times a day  insulin glargine Injectable (LANTUS) 20 Unit(s) SubCutaneous at bedtime  insulin lispro (HumaLOG) corrective regimen sliding scale   SubCutaneous three times a day before meals  insulin lispro (HumaLOG) corrective regimen sliding scale   SubCutaneous at bedtime  labetalol 300 milliGRAM(s) Oral two times a day  tiotropium 18 MICROgram(s) Capsule 1 Capsule(s) Inhalation daily    MEDICATIONS  (PRN):  dextrose 40% Gel 15 Gram(s) Oral once PRN Blood Glucose LESS THAN 70 milliGRAM(s)/deciliter  glucagon  Injectable 1 milliGRAM(s) IntraMuscular once PRN Glucose LESS THAN 70 milligrams/deciliter    Vital Signs Last 24 Hrs  T(C): 36.3 (10 Apr 2020 06:27), Max: 36.9 (09 Apr 2020 22:30)  T(F): 97.3 (10 Apr 2020 06:27), Max: 98.4 (09 Apr 2020 22:30)  HR: 82 (10 Apr 2020 06:27) (64 - 82)  BP: 157/90 (10 Apr 2020 06:27) (128/70 - 173/96)  BP(mean): --  RR: 16 (10 Apr 2020 06:27) (16 - 22)  SpO2: 96% (10 Apr 2020 06:27) (82% - 96%)    I&O's Summary    09 Apr 2020 07:01  -  10 Apr 2020 07:00  --------------------------------------------------------  IN: 0 mL / OUT: 1600 mL / NET: -1600 mL          Physical Exam:   GENERAL: NAD, well-groomed, well-developed  HEENT: PABLITO/   Atraumatic, Normocephalic  ENMT: No tonsillar erythema, exudates, or enlargement; Moist mucous membranes, Good dentition, No lesions  NECK: Supple, No JVD, Normal thyroid  CHEST/LUNG: Clear to auscultaion, ; No rales, rhonchi, wheezing, or rubs  CVS: Regular rate and rhythm; No murmurs, rubs, or gallops  GI: : Soft, Nontender, Nondistended; Bowel sounds present  NERVOUS SYSTEM:  Alert & Oriented X3, Good concentration; Motor Strength 5/5 B/L upper and lower extremities; DTRs 2+ intact and symmetric  EXTREMITIES:  2+ Peripheral Pulses, No clubbing, cyanosis, or edema  LYMPH: No lymphadenopathy noted  SKIN: No rashes or lesions  ENDOCRINOLOGY: No Thyromegaly  PSYCH: Appropriate    Labs:                              15.7   9.47  )-----------( 313      ( 10 Apr 2020 07:20 )             49.0     04-10    143  |  106  |  60<H>  ----------------------------<  132<H>  4.9   |  25  |  2.16<H>  04-09    146<H>  |  109<H>  |  76<H>  ----------------------------<  97  4.6   |  25  |  2.51<H>  04-08    143  |  108<H>  |  97<H>  ----------------------------<  74  4.2   |  23  |  2.59<H>    Ca    9.8      10 Apr 2020 07:30  Ca    9.4      09 Apr 2020 09:15  Phos  4.2     04-10  Phos  5.2     04-09  Mg     2.2     04-10  Mg     2.3     04-09    TPro  6.4  /  Alb  3.0<L>  /  TBili  0.3  /  DBili  x   /  AST  36  /  ALT  47<H>  /  AlkPhos  70  04-08    CAPILLARY BLOOD GLUCOSE      POCT Blood Glucose.: 92 mg/dL (10 Apr 2020 11:55)  POCT Blood Glucose.: 140 mg/dL (09 Apr 2020 22:28)  POCT Blood Glucose.: 97 mg/dL (09 Apr 2020 17:00)            D-Dimer Assay, Quantitative: 514 ng/mL (04-08 @ 19:00)  D-Dimer Assay, Quantitative: 486 ng/mL (04-05 @ 18:23)  D-Dimer Assay, Quantitative: 393 ng/mL (04-04 @ 15:19)        RECENT CULTURES:        RESPIRATORY CULTURES:          Studies  Chest X-RAY  CT SCAN Chest   Venous Dopplers: LE:   CT Abdomen  Others

## 2020-04-10 NOTE — PROGRESS NOTE ADULT - SUBJECTIVE AND OBJECTIVE BOX
CHIEF COMPLAINT:Patient is a 65y old  Male who presents with a chief complaint of pneumonia, resp failure (09 Apr 2020 13:05)    	        PAST MEDICAL & SURGICAL HISTORY:  Heart murmur: recent dx  Cholelithiasis  COPD (chronic obstructive pulmonary disease)  Diabetes: fs this am 120  Hypertension  S/P exploratory laparotomy: 23 years ago          REVIEW OF SYSTEMS:  feeling better  CARDIOVASCULAR: No chest pain, palpitations, passing out, dizziness, or leg swelling  GASTROINTESTINAL: No abdominal or epigastric pain. No nausea, vomiting, or hematemesis;  GENITOURINARY: No dysuria, frequency, hematuria, or incontinence  NEUROLOGICAL: No headaches,     Medications:  MEDICATIONS  (STANDING):  ALBUTerol    90 MICROgram(s) HFA Inhaler 2 Puff(s) Inhalation every 6 hours  amLODIPine   Tablet 10 milliGRAM(s) Oral daily  anakinra Injectable 100 milliGRAM(s) SubCutaneous daily  aspirin  chewable 81 milliGRAM(s) Oral daily  budesonide 160 MICROgram(s)/formoterol 4.5 MICROgram(s) Inhaler 2 Puff(s) Inhalation two times a day  cloNIDine 0.2 milliGRAM(s) Oral two times a day  dextrose 5%. 1000 milliLiter(s) (50 mL/Hr) IV Continuous <Continuous>  dextrose 50% Injectable 12.5 Gram(s) IV Push once  dextrose 50% Injectable 25 Gram(s) IV Push once  dextrose 50% Injectable 25 Gram(s) IV Push once  heparin  Injectable 5000 Unit(s) SubCutaneous every 8 hours  insulin glargine Injectable (LANTUS) 20 Unit(s) SubCutaneous at bedtime  insulin lispro (HumaLOG) corrective regimen sliding scale   SubCutaneous three times a day before meals  insulin lispro (HumaLOG) corrective regimen sliding scale   SubCutaneous at bedtime  insulin lispro Injectable (HumaLOG) 2 Unit(s) SubCutaneous three times a day before meals  labetalol 300 milliGRAM(s) Oral two times a day  tiotropium 18 MICROgram(s) Capsule 1 Capsule(s) Inhalation daily    MEDICATIONS  (PRN):  dextrose 40% Gel 15 Gram(s) Oral once PRN Blood Glucose LESS THAN 70 milliGRAM(s)/deciliter  glucagon  Injectable 1 milliGRAM(s) IntraMuscular once PRN Glucose LESS THAN 70 milligrams/deciliter    	    PHYSICAL EXAM:  T(C): 36.3 (04-10-20 @ 06:27), Max: 36.9 (04-09-20 @ 22:30)  HR: 82 (04-10-20 @ 06:27) (64 - 82)  BP: 157/90 (04-10-20 @ 06:27) (128/70 - 173/96)  RR: 16 (04-10-20 @ 06:27) (16 - 22)  SpO2: 96% (04-10-20 @ 06:27) (82% - 96%)  Wt(kg): --  I&O's Summary    09 Apr 2020 07:01  -  10 Apr 2020 07:00  --------------------------------------------------------  IN: 0 mL / OUT: 1600 mL / NET: -1600 mL        Appearance: Normal	  HEENT:   Normal oral mucosa, PERRL, EOMI	  Lymphatic: No lymphadenopathy  Cardiovascular: Normal S1 S2, No JVD, No murmurs, No edema  Respiratory:dec b s 	  Psychiatry: A & O x 3,   Gastrointestinal:  Soft, Non-tender, + BS	  Skin: No rashes, No ecchymoses, No cyanosis	  Neurologic: Non-focal  Extremities: Normal range of motion, No clubbing, cyanosis or edema  Vascular: Peripheral pulses palpable 2+ bilaterally    TELEMETRY: 	    ECG:  	  RADIOLOGY:  OTHER: 	  	  LABS:	 	    CARDIAC MARKERS:                                15.7   9.47  )-----------( 313      ( 10 Apr 2020 07:20 )             49.0     04-10    143  |  106  |  60<H>  ----------------------------<  132<H>  4.9   |  25  |  2.16<H>    Ca    9.8      10 Apr 2020 07:30  Phos  4.2     04-10  Mg     2.2     04-10      proBNP:   Lipid Profile:   HgA1c:   TSH:

## 2020-04-10 NOTE — PROGRESS NOTE ADULT - ASSESSMENT
· Assessment		  64yo M with HTN, T2DM on insulin, HLD, copd presents with shortness of breath Admitted w/ COVID pneumonia      CoV id infection.  c/w plaquenil / anakinra /with dec in o2 requirement noted  - Monitor resp status and pulse ox.   pulm f/u noted    - Isolation  - Tylenol prn for fever and pleuritic chest pain.       ·  Problem: COPD (chronic obstructive pulmonary disease).   pulm f/u noted    ·  Problem: NANCY (acute kidney injury) improving  f/u renal fxn noted  renal f/u      ·  Problem: Other congestive heart failure.     appears euvolemic        ·  Problem: Type 2 diabetes mellitus with hyperglycemia, with long-term current use of insulin  fsg elevated  endo eval noted  insulin adjusted      Problem: Essential hypertension. c/w meds as per cards  - Hold lisinopril given NANCY.    d/c if sating well on room air and cleared by cards/pulm / renal

## 2020-04-11 LAB
ANION GAP SERPL CALC-SCNC: 15 MMO/L — HIGH (ref 7–14)
BUN SERPL-MCNC: 52 MG/DL — HIGH (ref 7–23)
CALCIUM SERPL-MCNC: 9.7 MG/DL — SIGNIFICANT CHANGE UP (ref 8.4–10.5)
CHLORIDE SERPL-SCNC: 104 MMOL/L — SIGNIFICANT CHANGE UP (ref 98–107)
CO2 SERPL-SCNC: 23 MMOL/L — SIGNIFICANT CHANGE UP (ref 22–31)
CREAT SERPL-MCNC: 2.06 MG/DL — HIGH (ref 0.5–1.3)
GLUCOSE BLDC GLUCOMTR-MCNC: 105 MG/DL — HIGH (ref 70–99)
GLUCOSE BLDC GLUCOMTR-MCNC: 121 MG/DL — HIGH (ref 70–99)
GLUCOSE BLDC GLUCOMTR-MCNC: 161 MG/DL — HIGH (ref 70–99)
GLUCOSE SERPL-MCNC: 111 MG/DL — HIGH (ref 70–99)
MAGNESIUM SERPL-MCNC: 2.1 MG/DL — SIGNIFICANT CHANGE UP (ref 1.6–2.6)
PHOSPHATE SERPL-MCNC: 3.9 MG/DL — SIGNIFICANT CHANGE UP (ref 2.5–4.5)
POTASSIUM SERPL-MCNC: 4.9 MMOL/L — SIGNIFICANT CHANGE UP (ref 3.5–5.3)
POTASSIUM SERPL-SCNC: 4.9 MMOL/L — SIGNIFICANT CHANGE UP (ref 3.5–5.3)
SODIUM SERPL-SCNC: 142 MMOL/L — SIGNIFICANT CHANGE UP (ref 135–145)

## 2020-04-11 PROCEDURE — 93010 ELECTROCARDIOGRAM REPORT: CPT

## 2020-04-11 RX ADMIN — AMLODIPINE BESYLATE 10 MILLIGRAM(S): 2.5 TABLET ORAL at 06:02

## 2020-04-11 RX ADMIN — Medication 100 MILLIGRAM(S): at 12:21

## 2020-04-11 RX ADMIN — BUDESONIDE AND FORMOTEROL FUMARATE DIHYDRATE 2 PUFF(S): 160; 4.5 AEROSOL RESPIRATORY (INHALATION) at 09:07

## 2020-04-11 RX ADMIN — Medication 300 MILLIGRAM(S): at 17:31

## 2020-04-11 RX ADMIN — Medication 1: at 12:21

## 2020-04-11 RX ADMIN — HEPARIN SODIUM 5000 UNIT(S): 5000 INJECTION INTRAVENOUS; SUBCUTANEOUS at 06:02

## 2020-04-11 RX ADMIN — Medication 0.2 MILLIGRAM(S): at 06:02

## 2020-04-11 RX ADMIN — Medication 25 MILLIGRAM(S): at 06:02

## 2020-04-11 RX ADMIN — TIOTROPIUM BROMIDE 1 CAPSULE(S): 18 CAPSULE ORAL; RESPIRATORY (INHALATION) at 09:08

## 2020-04-11 RX ADMIN — Medication 300 MILLIGRAM(S): at 06:02

## 2020-04-11 RX ADMIN — Medication 0.2 MILLIGRAM(S): at 17:32

## 2020-04-11 RX ADMIN — HEPARIN SODIUM 5000 UNIT(S): 5000 INJECTION INTRAVENOUS; SUBCUTANEOUS at 12:21

## 2020-04-11 RX ADMIN — ALBUTEROL 2 PUFF(S): 90 AEROSOL, METERED ORAL at 05:02

## 2020-04-11 RX ADMIN — ALBUTEROL 2 PUFF(S): 90 AEROSOL, METERED ORAL at 12:13

## 2020-04-11 RX ADMIN — Medication 25 MILLIGRAM(S): at 17:31

## 2020-04-11 RX ADMIN — ALBUTEROL 2 PUFF(S): 90 AEROSOL, METERED ORAL at 17:31

## 2020-04-11 RX ADMIN — Medication 81 MILLIGRAM(S): at 12:21

## 2020-04-11 NOTE — PROGRESS NOTE ADULT - ASSESSMENT
· Assessment		  66yo M with HTN, T2DM on insulin, HLD, copd presents with shortness of breath Admitted w/ COVID pneumonia      CoV id infection.  c/w plaquenil / anakinra /with dec in o2 requirement noted  - Monitor resp status and pulse ox.   pulm f/    - Isolation  - Tylenol prn for fever and pleuritic chest pain.       ·  Problem: COPD (chronic obstructive pulmonary disease).   pulm f/u noted    ·  Problem: NANCY (acute kidney injury) improving  f/u renal fxn noted  renal f/u      ·  Problem: Other congestive heart failure.     appears euvolemic        ·  Problem: Type 2 diabetes mellitus with hyperglycemia, with long-term current use of insulin  fsg riss  endo eval noted  insulin adjusted      Problem: Essential hypertension. c/w meds as per cards  - Hold lisinopril given NANCY.    d/c if sating well on 2 to 3 liters  dec o2   as able

## 2020-04-11 NOTE — PROGRESS NOTE ADULT - ASSESSMENT
ECHO  5/10/19 minimial MR, moderate bi-atrial enlargement, nl LV sys fx, ef 67%, mod diastolic dysfx stage 2    a/p  65 year old male with hx Hypertension, Diabetes Mellitus, Aortic Regurgitation, Diastolic Heart Failure, CBD Stone, s/p Stent, PAD  shortness of breath x 1 day. Admitted for suspected COVID pneumonia vs. CHF exacerbation.    1. SOB; + covid infection, covid associated PNA  -Covid + 4/2   -ct chest with bl predominatly grounglass opacity infiltrates without associated pleural effusions ? atypical pna   -s/p plaquenil, on anakinra   -ID / med f/u   -02 supplementation as needed    2.  Chronic Diastolic CHF   -stable, s/p 40 mg IVP lasix x1; volume status stable  -avoid further lasix for now given nancy, acute covid infection , creat improving   -monitor for fluid overloaded   -c/w bb   -repeat ECHO if feasible or bedside POCUS if pt deteriorates to eval LV function and AR     3. Aortic Regurgitation  -cv stable     4. htn   -bp improved, continue hydral , labetalol, clonidine   -acei on hold given NANCY , continue current meds     dvt ppx

## 2020-04-11 NOTE — PROGRESS NOTE ADULT - NSREFPHYEXINPTDOCREFER_GEN_ALL_CORE
assessment as per primary team, rn assessment reviewed
inpatient physical exam
internal medicine
primary team, rn assessment reviewed
Cards
PMD
cards
m base
pmd
renal

## 2020-04-11 NOTE — PROGRESS NOTE ADULT - SUBJECTIVE AND OBJECTIVE BOX
Patient is a 65y old  Male who presents with a chief complaint of pneumonia, resp failure (11 Apr 2020 10:26)      Any change in ROS:   doingok    MEDICATIONS  (STANDING):  ALBUTerol    90 MICROgram(s) HFA Inhaler 2 Puff(s) Inhalation every 6 hours  amLODIPine   Tablet 10 milliGRAM(s) Oral daily  anakinra Injectable 100 milliGRAM(s) SubCutaneous daily  aspirin  chewable 81 milliGRAM(s) Oral daily  budesonide 160 MICROgram(s)/formoterol 4.5 MICROgram(s) Inhaler 2 Puff(s) Inhalation two times a day  cloNIDine 0.2 milliGRAM(s) Oral two times a day  dextrose 5%. 1000 milliLiter(s) (50 mL/Hr) IV Continuous <Continuous>  dextrose 50% Injectable 12.5 Gram(s) IV Push once  dextrose 50% Injectable 25 Gram(s) IV Push once  dextrose 50% Injectable 25 Gram(s) IV Push once  heparin  Injectable 5000 Unit(s) SubCutaneous every 8 hours  hydrALAZINE 25 milliGRAM(s) Oral two times a day  insulin glargine Injectable (LANTUS) 20 Unit(s) SubCutaneous at bedtime  insulin lispro (HumaLOG) corrective regimen sliding scale   SubCutaneous three times a day before meals  insulin lispro (HumaLOG) corrective regimen sliding scale   SubCutaneous at bedtime  labetalol 300 milliGRAM(s) Oral two times a day  tiotropium 18 MICROgram(s) Capsule 1 Capsule(s) Inhalation daily    MEDICATIONS  (PRN):  dextrose 40% Gel 15 Gram(s) Oral once PRN Blood Glucose LESS THAN 70 milliGRAM(s)/deciliter  glucagon  Injectable 1 milliGRAM(s) IntraMuscular once PRN Glucose LESS THAN 70 milligrams/deciliter    Vital Signs Last 24 Hrs  T(C): 36.2 (11 Apr 2020 06:01), Max: 36.7 (10 Apr 2020 16:39)  T(F): 97.2 (11 Apr 2020 06:01), Max: 98.1 (10 Apr 2020 16:39)  HR: 75 (11 Apr 2020 06:01) (65 - 75)  BP: 155/85 (11 Apr 2020 06:01) (136/75 - 155/85)  BP(mean): --  RR: 18 (11 Apr 2020 06:01) (17 - 18)  SpO2: 96% (11 Apr 2020 06:01) (95% - 98%)    I&O's Summary        Physical Exam:   GENERAL: NAD, well-groomed, well-developed  HEENT: PABLITO/   Atraumatic, Normocephalic  ENMT: No tonsillar erythema, exudates, or enlargement; Moist mucous membranes, Good dentition, No lesions  NECK: Supple, No JVD, Normal thyroid  CHEST/LUNG: Clear to auscultaion, ; No rales, rhonchi, wheezing, or rubs  CVS: Regular rate and rhythm; No murmurs, rubs, or gallops  GI: : Soft, Nontender, Nondistended; Bowel sounds present  NERVOUS SYSTEM:  Alert & Oriented X3, Good concentration; Motor Strength 5/5 B/L upper and lower extremities; DTRs 2+ intact and symmetric  EXTREMITIES:  2+ Peripheral Pulses, No clubbing, cyanosis, or edema  LYMPH: No lymphadenopathy noted  SKIN: No rashes or lesions  ENDOCRINOLOGY: No Thyromegaly  PSYCH: Appropriate    Labs:                              15.7   9.47  )-----------( 313      ( 10 Apr 2020 07:20 )             49.0     04-11    142  |  104  |  52<H>  ----------------------------<  111<H>  4.9   |  23  |  2.06<H>  04-10    143  |  106  |  60<H>  ----------------------------<  132<H>  4.9   |  25  |  2.16<H>  04-09    146<H>  |  109<H>  |  76<H>  ----------------------------<  97  4.6   |  25  |  2.51<H>  04-08    143  |  108<H>  |  97<H>  ----------------------------<  74  4.2   |  23  |  2.59<H>    Ca    9.7      11 Apr 2020 03:40  Ca    9.8      10 Apr 2020 07:30  Phos  3.9     04-11  Phos  4.2     04-10  Mg     2.1     04-11  Mg     2.2     04-10    TPro  6.4  /  Alb  3.0<L>  /  TBili  0.3  /  DBili  x   /  AST  36  /  ALT  47<H>  /  AlkPhos  70  04-08    CAPILLARY BLOOD GLUCOSE      POCT Blood Glucose.: 161 mg/dL (11 Apr 2020 12:01)  POCT Blood Glucose.: 105 mg/dL (11 Apr 2020 08:01)  POCT Blood Glucose.: 143 mg/dL (10 Apr 2020 22:09)  POCT Blood Glucose.: 127 mg/dL (10 Apr 2020 17:06)            D-Dimer Assay, Quantitative: 514 ng/mL (04-08 @ 19:00)  D-Dimer Assay, Quantitative: 486 ng/mL (04-05 @ 18:23)  D-Dimer Assay, Quantitative: 393 ng/mL (04-04 @ 15:19)        RECENT CULTURES:        RESPIRATORY CULTURES:          Studies  Chest X-RAY  CT SCAN Chest   Venous Dopplers: LE:   CT Abdomen  Others

## 2020-04-11 NOTE — PROGRESS NOTE ADULT - SUBJECTIVE AND OBJECTIVE BOX
CARDIOLOGY FOLLOW UP - Dr. South    CC no acute events       PHYSICAL EXAM:  T(C): 36.2 (04-11-20 @ 06:01), Max: 36.7 (04-10-20 @ 16:39)  HR: 75 (04-11-20 @ 06:01) (65 - 75)  BP: 155/85 (04-11-20 @ 06:01) (136/75 - 155/85)  RR: 18 (04-11-20 @ 06:01) (17 - 18)  SpO2: 96% (04-11-20 @ 06:01) (95% - 98%)  Wt(kg): --  I&O's Summary            MEDICATIONS  (STANDING):  ALBUTerol    90 MICROgram(s) HFA Inhaler 2 Puff(s) Inhalation every 6 hours  amLODIPine   Tablet 10 milliGRAM(s) Oral daily  anakinra Injectable 100 milliGRAM(s) SubCutaneous daily  aspirin  chewable 81 milliGRAM(s) Oral daily  budesonide 160 MICROgram(s)/formoterol 4.5 MICROgram(s) Inhaler 2 Puff(s) Inhalation two times a day  cloNIDine 0.2 milliGRAM(s) Oral two times a day  dextrose 5%. 1000 milliLiter(s) (50 mL/Hr) IV Continuous <Continuous>  dextrose 50% Injectable 12.5 Gram(s) IV Push once  dextrose 50% Injectable 25 Gram(s) IV Push once  dextrose 50% Injectable 25 Gram(s) IV Push once  heparin  Injectable 5000 Unit(s) SubCutaneous every 8 hours  hydrALAZINE 25 milliGRAM(s) Oral two times a day  insulin glargine Injectable (LANTUS) 20 Unit(s) SubCutaneous at bedtime  insulin lispro (HumaLOG) corrective regimen sliding scale   SubCutaneous three times a day before meals  insulin lispro (HumaLOG) corrective regimen sliding scale   SubCutaneous at bedtime  labetalol 300 milliGRAM(s) Oral two times a day  tiotropium 18 MICROgram(s) Capsule 1 Capsule(s) Inhalation daily      TELEMETRY: 	    ECG:  	  RADIOLOGY:   DIAGNOSTIC TESTING:  [ ] Echocardiogram:  [ ]  Catheterization:  [ ] Stress Test:    OTHER: 	    LABS:	 	                                15.7   9.47  )-----------( 313      ( 10 Apr 2020 07:20 )             49.0     04-11    142  |  104  |  52<H>  ----------------------------<  111<H>  4.9   |  23  |  2.06<H>    Ca    9.7      11 Apr 2020 03:40  Phos  3.9     04-11  Mg     2.1     04-11

## 2020-04-11 NOTE — PROGRESS NOTE ADULT - ASSESSMENT
66yo M with HTN, T2DM on insulin, HLD, copd presents with shortness of breath x 1 day. Admitted for suspected COVID pneumonia vs. CHF exacerbation.    COVID PNEUMONIA    d4 of plaquenil  d3 of anakinra  day 3 of solumedrol:  on 3 L of nasal cannula    crp is decreasing:   follow up leena maria as wellas ferritin tomorrow: he has high ferritin:  LFTS normal   :  d5 of laquenil: and steorids: dc after today :  d4 of anakinra: from tomorrow am: finished qid dosing today   crp is down:     :   on 3 L of nasal cannula: \not obviously sob: dc steroids:  cont anakinra: d5 of bid dosing:   D5 dvt prophyalxis:   blood glcuse is co ntrolled:   if he comes off oxygen/l then dcplannin/8:  d6 of anakinra dosing: bid: from tomorrow: change to once a day  cont dvt rpopahxylis  on 3 L of oxygen:   monitor leena maria:   his symptoms are better:     :   last day of q bid dosing of anakinra  off steroids as well as plaquenil: o2 : 2 L per minute: improved   crp is decreased; ferritin as wellas d dimer is mildly increased:   Cont symbicort for copd:   dvt prophaylxis   has ckd too: creat downtrending    4/10:   change to q daily anakinra:  hypoxia is btter: but still hypoaxic:  ? dco n oxygen :  dvt propahylaxis   cont symbicort  / :    on 3 L of o2:  on ankinra: q daily  doing better from before:  has copd: cont symbicort:  cont current rx:   dc planning with ? o2:        DM  HTN  CHF   NANCY

## 2020-04-11 NOTE — PROGRESS NOTE ADULT - SUBJECTIVE AND OBJECTIVE BOX
CHIEF COMPLAINT:Patient is a 65y old  Male who presents with a chief complaint of pneumonia, resp failure (11 Apr 2020 10:18)    	        PAST MEDICAL & SURGICAL HISTORY:  Heart murmur: recent dx  Cholelithiasis  COPD (chronic obstructive pulmonary disease)  Diabetes: fs this am 120  Hypertension  S/P exploratory laparotomy: 23 years ago          REVIEW OF SYSTEMS:  less sob   CARDIOVASCULAR: No chest pain, palpitations, passing out, dizziness, or leg swelling  GASTROINTESTINAL: No abdominal or epigastric pain. No nausea, vomiting, or hematemesis; No diarrhea or constipation. No melena or hematochezia.  GENITOURINARY: No dysuria, frequency, hematuria, or incontinence  NEUROLOGICAL: No headaches, memory loss,   feels better    Medications:  MEDICATIONS  (STANDING):  ALBUTerol    90 MICROgram(s) HFA Inhaler 2 Puff(s) Inhalation every 6 hours  amLODIPine   Tablet 10 milliGRAM(s) Oral daily  anakinra Injectable 100 milliGRAM(s) SubCutaneous daily  aspirin  chewable 81 milliGRAM(s) Oral daily  budesonide 160 MICROgram(s)/formoterol 4.5 MICROgram(s) Inhaler 2 Puff(s) Inhalation two times a day  cloNIDine 0.2 milliGRAM(s) Oral two times a day  dextrose 5%. 1000 milliLiter(s) (50 mL/Hr) IV Continuous <Continuous>  dextrose 50% Injectable 12.5 Gram(s) IV Push once  dextrose 50% Injectable 25 Gram(s) IV Push once  dextrose 50% Injectable 25 Gram(s) IV Push once  heparin  Injectable 5000 Unit(s) SubCutaneous every 8 hours  hydrALAZINE 25 milliGRAM(s) Oral two times a day  insulin glargine Injectable (LANTUS) 20 Unit(s) SubCutaneous at bedtime  insulin lispro (HumaLOG) corrective regimen sliding scale   SubCutaneous three times a day before meals  insulin lispro (HumaLOG) corrective regimen sliding scale   SubCutaneous at bedtime  labetalol 300 milliGRAM(s) Oral two times a day  tiotropium 18 MICROgram(s) Capsule 1 Capsule(s) Inhalation daily    MEDICATIONS  (PRN):  dextrose 40% Gel 15 Gram(s) Oral once PRN Blood Glucose LESS THAN 70 milliGRAM(s)/deciliter  glucagon  Injectable 1 milliGRAM(s) IntraMuscular once PRN Glucose LESS THAN 70 milligrams/deciliter    	    PHYSICAL EXAM:  T(C): 36.2 (04-11-20 @ 06:01), Max: 36.7 (04-10-20 @ 16:39)  HR: 75 (04-11-20 @ 06:01) (65 - 75)  BP: 155/85 (04-11-20 @ 06:01) (136/75 - 155/85)  RR: 18 (04-11-20 @ 06:01) (17 - 18)  SpO2: 96% (04-11-20 @ 06:01) (95% - 98%)  Wt(kg): --  I&O's Summary      Appearance: Normal	  HEENT:   Normal oral mucosa, PERRL, EOMI	  Lymphatic: No lymphadenopathy  Cardiovascular: Normal S1 S2, No JVD, No murmurs, No edema  Respiratory: Lungs clear to auscultation	  Psychiatry: A & O x 3, Mood & affect appropriate  Gastrointestinal:  Soft, Non-tender, + BS	  Skin: No rashes, No ecchymoses, No cyanosis	  Neurologic: Non-focal  Extremities: Normal range of motion, No clubbing, cyanosis or edema  Vascular: Peripheral pulses palpable 2+ bilaterally    TELEMETRY: 	    ECG:  	  RADIOLOGY:  OTHER: 	  	  LABS:	 	    CARDIAC MARKERS:                                15.7   9.47  )-----------( 313      ( 10 Apr 2020 07:20 )             49.0     04-11    142  |  104  |  52<H>  ----------------------------<  111<H>  4.9   |  23  |  2.06<H>    Ca    9.7      11 Apr 2020 03:40  Phos  3.9     04-11  Mg     2.1     04-11      proBNP:   Lipid Profile:   HgA1c:   TSH:

## 2020-04-12 ENCOUNTER — TRANSCRIPTION ENCOUNTER (OUTPATIENT)
Age: 65
End: 2020-04-12

## 2020-04-12 VITALS
RESPIRATION RATE: 17 BRPM | HEART RATE: 96 BPM | OXYGEN SATURATION: 100 % | TEMPERATURE: 98 F | DIASTOLIC BLOOD PRESSURE: 82 MMHG | SYSTOLIC BLOOD PRESSURE: 129 MMHG

## 2020-04-12 LAB
ANION GAP SERPL CALC-SCNC: 11 MMO/L — SIGNIFICANT CHANGE UP (ref 7–14)
BUN SERPL-MCNC: 50 MG/DL — HIGH (ref 7–23)
CALCIUM SERPL-MCNC: 9.4 MG/DL — SIGNIFICANT CHANGE UP (ref 8.4–10.5)
CHLORIDE SERPL-SCNC: 101 MMOL/L — SIGNIFICANT CHANGE UP (ref 98–107)
CO2 SERPL-SCNC: 24 MMOL/L — SIGNIFICANT CHANGE UP (ref 22–31)
CREAT SERPL-MCNC: 2.29 MG/DL — HIGH (ref 0.5–1.3)
GLUCOSE BLDC GLUCOMTR-MCNC: 101 MG/DL — HIGH (ref 70–99)
GLUCOSE BLDC GLUCOMTR-MCNC: 110 MG/DL — HIGH (ref 70–99)
GLUCOSE BLDC GLUCOMTR-MCNC: 148 MG/DL — HIGH (ref 70–99)
GLUCOSE BLDC GLUCOMTR-MCNC: 159 MG/DL — HIGH (ref 70–99)
GLUCOSE SERPL-MCNC: 121 MG/DL — HIGH (ref 70–99)
HCT VFR BLD CALC: 44.6 % — SIGNIFICANT CHANGE UP (ref 39–50)
HGB BLD-MCNC: 14 G/DL — SIGNIFICANT CHANGE UP (ref 13–17)
LACTATE SERPL-SCNC: 1.2 MMOL/L — SIGNIFICANT CHANGE UP (ref 0.5–2)
MAGNESIUM SERPL-MCNC: 2 MG/DL — SIGNIFICANT CHANGE UP (ref 1.6–2.6)
MCHC RBC-ENTMCNC: 28.3 PG — SIGNIFICANT CHANGE UP (ref 27–34)
MCHC RBC-ENTMCNC: 31.4 % — LOW (ref 32–36)
MCV RBC AUTO: 90.1 FL — SIGNIFICANT CHANGE UP (ref 80–100)
NRBC # FLD: 0 K/UL — SIGNIFICANT CHANGE UP (ref 0–0)
PHOSPHATE SERPL-MCNC: 4.2 MG/DL — SIGNIFICANT CHANGE UP (ref 2.5–4.5)
PLATELET # BLD AUTO: 229 K/UL — SIGNIFICANT CHANGE UP (ref 150–400)
PMV BLD: 11.1 FL — SIGNIFICANT CHANGE UP (ref 7–13)
POTASSIUM SERPL-MCNC: 4.7 MMOL/L — SIGNIFICANT CHANGE UP (ref 3.5–5.3)
POTASSIUM SERPL-SCNC: 4.7 MMOL/L — SIGNIFICANT CHANGE UP (ref 3.5–5.3)
RBC # BLD: 4.95 M/UL — SIGNIFICANT CHANGE UP (ref 4.2–5.8)
RBC # FLD: 14.3 % — SIGNIFICANT CHANGE UP (ref 10.3–14.5)
SODIUM SERPL-SCNC: 136 MMOL/L — SIGNIFICANT CHANGE UP (ref 135–145)
WBC # BLD: 9.29 K/UL — SIGNIFICANT CHANGE UP (ref 3.8–10.5)
WBC # FLD AUTO: 9.29 K/UL — SIGNIFICANT CHANGE UP (ref 3.8–10.5)

## 2020-04-12 PROCEDURE — 99238 HOSP IP/OBS DSCHRG MGMT 30/<: CPT

## 2020-04-12 RX ORDER — INSULIN GLARGINE 100 [IU]/ML
18 INJECTION, SOLUTION SUBCUTANEOUS
Qty: 10 | Refills: 0
Start: 2020-04-12 | End: 2020-05-11

## 2020-04-12 RX ORDER — ALBUTEROL 90 UG/1
2 AEROSOL, METERED ORAL
Qty: 1 | Refills: 0
Start: 2020-04-12 | End: 2020-05-11

## 2020-04-12 RX ORDER — METFORMIN HYDROCHLORIDE 850 MG/1
1 TABLET ORAL
Qty: 0 | Refills: 0 | DISCHARGE

## 2020-04-12 RX ORDER — INSULIN GLARGINE 100 [IU]/ML
60 INJECTION, SOLUTION SUBCUTANEOUS
Qty: 0 | Refills: 0 | DISCHARGE

## 2020-04-12 RX ORDER — HYDRALAZINE HCL 50 MG
1 TABLET ORAL
Qty: 60 | Refills: 0
Start: 2020-04-12 | End: 2020-05-11

## 2020-04-12 RX ADMIN — Medication 100 MILLIGRAM(S): at 12:53

## 2020-04-12 RX ADMIN — Medication 25 MILLIGRAM(S): at 17:45

## 2020-04-12 RX ADMIN — ALBUTEROL 2 PUFF(S): 90 AEROSOL, METERED ORAL at 00:25

## 2020-04-12 RX ADMIN — AMLODIPINE BESYLATE 10 MILLIGRAM(S): 2.5 TABLET ORAL at 05:25

## 2020-04-12 RX ADMIN — Medication 0.2 MILLIGRAM(S): at 17:45

## 2020-04-12 RX ADMIN — INSULIN GLARGINE 20 UNIT(S): 100 INJECTION, SOLUTION SUBCUTANEOUS at 00:24

## 2020-04-12 RX ADMIN — Medication 81 MILLIGRAM(S): at 11:30

## 2020-04-12 RX ADMIN — Medication 0.2 MILLIGRAM(S): at 05:25

## 2020-04-12 RX ADMIN — ALBUTEROL 2 PUFF(S): 90 AEROSOL, METERED ORAL at 05:26

## 2020-04-12 RX ADMIN — HEPARIN SODIUM 5000 UNIT(S): 5000 INJECTION INTRAVENOUS; SUBCUTANEOUS at 00:24

## 2020-04-12 RX ADMIN — ALBUTEROL 2 PUFF(S): 90 AEROSOL, METERED ORAL at 12:53

## 2020-04-12 RX ADMIN — BUDESONIDE AND FORMOTEROL FUMARATE DIHYDRATE 2 PUFF(S): 160; 4.5 AEROSOL RESPIRATORY (INHALATION) at 00:24

## 2020-04-12 RX ADMIN — ALBUTEROL 2 PUFF(S): 90 AEROSOL, METERED ORAL at 17:44

## 2020-04-12 RX ADMIN — Medication 300 MILLIGRAM(S): at 05:26

## 2020-04-12 RX ADMIN — TIOTROPIUM BROMIDE 1 CAPSULE(S): 18 CAPSULE ORAL; RESPIRATORY (INHALATION) at 10:15

## 2020-04-12 RX ADMIN — BUDESONIDE AND FORMOTEROL FUMARATE DIHYDRATE 2 PUFF(S): 160; 4.5 AEROSOL RESPIRATORY (INHALATION) at 10:14

## 2020-04-12 RX ADMIN — HEPARIN SODIUM 5000 UNIT(S): 5000 INJECTION INTRAVENOUS; SUBCUTANEOUS at 05:25

## 2020-04-12 RX ADMIN — Medication 300 MILLIGRAM(S): at 17:44

## 2020-04-12 RX ADMIN — Medication 1: at 12:54

## 2020-04-12 RX ADMIN — HEPARIN SODIUM 5000 UNIT(S): 5000 INJECTION INTRAVENOUS; SUBCUTANEOUS at 12:53

## 2020-04-12 RX ADMIN — Medication 25 MILLIGRAM(S): at 05:26

## 2020-04-12 NOTE — DISCHARGE NOTE PROVIDER - CARE PROVIDER_API CALL
Primary Care Doctor,   Phone: (   )    -  Fax: (   )    -  Established Patient  Follow Up Time: 2 weeks

## 2020-04-12 NOTE — PROGRESS NOTE ADULT - ASSESSMENT
· Assessment		  66yo M with HTN, T2DM on insulin, HLD, copd presents with shortness of breath Admitted w/ COVID pneumonia      CoV id infection.  c/w plaquenil / anakinra /with dec in o2 requirement noted  - Monitor resp status and pulse ox.   ok now on room air  pulm f/u    - Isolation  - Tylenol prn for fever and pleuritic chest pain.       ·  Problem: COPD (chronic obstructive pulmonary disease).   pulm f/u   ·  Problem: NANCY (acute kidney injury) improving  f/u renal fxn noted  renal f/u      ·  Problem: Other congestive heart failure.     appears euvolemic        ·  Problem: Type 2 diabetes mellitus with hyperglycemia, with long-term current use of insulin  fsg riss  endo eval noted  insulin adjusted      Problem: Essential hypertension. c/w meds as per cards  - Hold lisinopril given NANCY.    d/c if  ok w/ pulm

## 2020-04-12 NOTE — PROGRESS NOTE ADULT - ASSESSMENT
ECHO  5/10/19 minimial MR, moderate bi-atrial enlargement, nl LV sys fx, ef 67%, mod diastolic dysfx stage 2    a/p  65 year old male with hx Hypertension, Diabetes Mellitus, Aortic Regurgitation, Diastolic Heart Failure, CBD Stone, s/p Stent, PAD  shortness of breath x 1 day. Admitted for suspected COVID pneumonia vs. CHF exacerbation.    1. SOB; + covid infection, covid associated PNA  -Covid + 4/2   -ct chest with bl predominatly grounglass opacity infiltrates without associated pleural effusions ? atypical pna   -s/p plaquenil, on anakinra   -ID / med f/u   -02 supplementation as needed, now on R/A     2.  Chronic Diastolic CHF   -stable, s/p 40 mg IVP lasix x1; volume status stable  -avoid further lasix for now given nancy, acute covid infection , creat improving   -monitor for fluid overloaded   -c/w bb   -repeat ECHO if feasible or bedside POCUS if pt deteriorates to eval LV function and AR     3. Aortic Regurgitation  -cv stable     4. htn   -bp improved, continue hydral , labetalol, clonidine   -acei on hold given NANCY , continue current meds     dvt ppx    dc planning per primary team

## 2020-04-12 NOTE — DISCHARGE NOTE PROVIDER - PROVIDER TOKENS
FREE:[LAST:[Primary Care Doctor],PHONE:[(   )    -],FAX:[(   )    -],FOLLOWUP:[2 weeks],ESTABLISHEDPATIENT:[T]]

## 2020-04-12 NOTE — PROGRESS NOTE ADULT - ASSESSMENT
64yo M with HTN, T2DM on insulin, HLD, copd presents with shortness of breath x 1 day. Admitted for suspected COVID pneumonia vs. CHF exacerbation.    COVID PNEUMONIA    d4 of plaquenil  d3 of anakinra  day 3 of solumedrol:  on 3 L of nasal cannula    crp is decreasing:   follow up d leena maria as wellas ferritin tomorrow: he has high ferritin:  LFTS normal   :  d5 of laquenil: and steorids: dc after today :  d4 of anakinra: from tomorrow am: finished qid dosing today   crp is down:     :   on 3 L of nasal cannula: \not obviously sob: dc steroids:  cont anakinra: d5 of bid dosing:   D5 dvt prophyalxis:   blood glcuse is co ntrolled:   if he comes off oxygen/l then dcplannin/8:  d6 of anakinra dosing: bid: from tomorrow: change to once a day  cont dvt rpopahxylis  on 3 L of oxygen:   monitor leena maria:   his symptoms are better:     :   last day of q bid dosing of anakinra  off steroids as well as plaquenil: o2 : 2 L per minute: improved   crp is decreased; ferritin as wellas d dimer is mildly increased:   Cont symbicort for copd:   dvt prophaylxis   has ckd too: creat downtrending    4/10:   change to q daily anakinra:  hypoxia is btter: but still hypoaxic:  ? dco n oxygen :  dvt propahylaxis   cont symbicort  / :    on 3 L of o2:  on ankinra: q daily  doing better from before:  has copd: cont symbicort:  cont current rx:   dc planning with ? o2:     :  off oxygen at this time  last day of anakinra finished today : he finished all the treatment of anakinra and covid pneumonia  dvt prophayxlis  dc planning from pulm point of view  dw np         DM  HTN  CHF   NANCY

## 2020-04-12 NOTE — DISCHARGE NOTE PROVIDER - NSDCCPCAREPLAN_GEN_ALL_CORE_FT
PRINCIPAL DISCHARGE DIAGNOSIS  Diagnosis: Suspected Radha coronavirus infection  Assessment and Plan of Treatment: You have been diagnosed with the COVID-19 virus during your hospital stay. You must self quarantine to complete a 14 day time period.  Monitor for fevers, shortness of breath and cough primarily.  Monitor your temperature daily to not any changes and increases.    It has been determined that you no longer need hospitalization and can recover while remaining in self-quarantine at home. You should follow the prevention steps below until a healthcare provider or local or state health department says you can return to your normal activities.  1. You should restrict activities outside your home, except for getting medical care.  2. Do not go to work, school, or public areas.  3. Avoid using public transportation, ride-sharing, or taxis.  4. Separate yourself from other people and animals in your home.  People: As much as possible, you should stay in a specific room and away from other people in your home. Also, you should use a separate bathroom, if available.  Animals: You should restrict contact with pets and other animals while you are sick with COVID-19, just like you would around other people. Although there have not been reports of pets or other animals becoming sick with COVID-19, it is still recommended that people sick with COVID-19 limit contact with animals until more information is known about the virus.  When possible, have another member of your household care for your animals while you are sick. If you must care for your pet or be around animals while you are sick, wash your hands before and after you interact with pets and wear a facemask.  5. Call ahead before visiting your doctor.  If you have a medical appointment, call the healthcare provider and tell them that you have or may have COVID-19. This will help the healthcare provider’s office take steps to keep other people from getting infected or exposed.  6. Wear a facemask.  You should wear a facemask when you are around other people (e.g., sharing a room or vehicle) or pets and before you enter a healthcare pro      SECONDARY DISCHARGE DIAGNOSES  Diagnosis: Essential hypertension  Assessment and Plan of Treatment: Continue current blood pressure medication regimen as directed. Monitor for any visual changes, headaches or dizziness.  Monitor blood pressure regularly.  Follow up with your PCP for further management for high blood pressure, please call to make appointment within 1 week of discharge  Do not take your lasix or lisinopril because your kidney numbrs were elevated.  Follow up with your primary care doctor for continued monitoring and maintenance    Diagnosis: Diabetes  Assessment and Plan of Treatment: Your Toujea dose decreased 18 units! Please note  the change and follow up with your PCP or endocrinologist within 1 month. DO NOT TAKE YOUR METFORMIN because your kidney numbers were elevated

## 2020-04-12 NOTE — DISCHARGE NOTE PROVIDER - NSDCFUADDINST_GEN_ALL_CORE_FT
1. You should restrict activities outside your home, except for getting medical care.  2. Do not go to work, school, or public areas.  3. Avoid using public transportation, ride-sharing, or taxis.  4. Separate yourself from other people and animals in your home.  People: As much as possible, you should stay in a specific room and away from other people in your home. Also, you should use a separate bathroom, if available.

## 2020-04-12 NOTE — PROGRESS NOTE ADULT - SUBJECTIVE AND OBJECTIVE BOX
CHIEF COMPLAINT:Patient is a 65y old  Male who presents with a chief complaint of pneumonia, resp failure (12 Apr 2020 09:33)    	        PAST MEDICAL & SURGICAL HISTORY:  Heart murmur: recent dx  Cholelithiasis  COPD (chronic obstructive pulmonary disease)  Diabetes: fs this am 120  Hypertension  S/P exploratory laparotomy: 23 years ago          REVIEW OF SYSTEMS:  feels better  less sob/ cough   CARDIOVASCULAR: No chest pain, palpitations, passing out, dizziness, or leg swelling  GASTROINTESTINAL: No abdominal or epigastric pain. No nausea, vomiting, or hematemesis; No diarrhea or constipation. No melena or hematochezia.  GENITOURINARY: No dysuria, frequency, hematuria, or incontinence  NEUROLOGICAL: No headaches, memory loss,   Medications:  MEDICATIONS  (STANDING):  ALBUTerol    90 MICROgram(s) HFA Inhaler 2 Puff(s) Inhalation every 6 hours  amLODIPine   Tablet 10 milliGRAM(s) Oral daily  anakinra Injectable 100 milliGRAM(s) SubCutaneous daily  aspirin  chewable 81 milliGRAM(s) Oral daily  budesonide 160 MICROgram(s)/formoterol 4.5 MICROgram(s) Inhaler 2 Puff(s) Inhalation two times a day  cloNIDine 0.2 milliGRAM(s) Oral two times a day  dextrose 5%. 1000 milliLiter(s) (50 mL/Hr) IV Continuous <Continuous>  dextrose 50% Injectable 12.5 Gram(s) IV Push once  dextrose 50% Injectable 25 Gram(s) IV Push once  dextrose 50% Injectable 25 Gram(s) IV Push once  heparin  Injectable 5000 Unit(s) SubCutaneous every 8 hours  hydrALAZINE 25 milliGRAM(s) Oral two times a day  insulin glargine Injectable (LANTUS) 20 Unit(s) SubCutaneous at bedtime  insulin lispro (HumaLOG) corrective regimen sliding scale   SubCutaneous three times a day before meals  insulin lispro (HumaLOG) corrective regimen sliding scale   SubCutaneous at bedtime  labetalol 300 milliGRAM(s) Oral two times a day  tiotropium 18 MICROgram(s) Capsule 1 Capsule(s) Inhalation daily    MEDICATIONS  (PRN):  dextrose 40% Gel 15 Gram(s) Oral once PRN Blood Glucose LESS THAN 70 milliGRAM(s)/deciliter  glucagon  Injectable 1 milliGRAM(s) IntraMuscular once PRN Glucose LESS THAN 70 milligrams/deciliter    	    PHYSICAL EXAM:  T(C): 36.6 (04-12-20 @ 05:22), Max: 36.6 (04-12-20 @ 00:14)  HR: 80 (04-12-20 @ 05:22) (71 - 82)  BP: 118/60 (04-12-20 @ 05:22) (118/60 - 138/78)  RR: 18 (04-12-20 @ 11:01) (18 - 19)  SpO2: 95% (04-12-20 @ 11:01) (94% - 97%)  Wt(kg): --  I&O's Summary      Appearance: Normal	  HEENT:   Normal oral mucosa, PERRL, EOMI	  Lymphatic: No lymphadenopathy  Cardiovascular: Normal S1 S2, No JVD, No murmurs, No edema  Respiratory: Lungs clear to auscultation	  Psychiatry: A & O x 3, Mood & affect appropriate  Gastrointestinal:  Soft, Non-tender, + BS	  Skin: No rashes, No ecchymoses, No cyanosis	  Neurologic: Non-focal  Extremities: Normal range of motion, No clubbing, cyanosis or edema  Vascular: Peripheral pulses palpable 2+ bilaterally    TELEMETRY: 	    ECG:  	  RADIOLOGY:  OTHER: 	  	  LABS:	 	    CARDIAC MARKERS:                                14.0   9.29  )-----------( 229      ( 12 Apr 2020 06:24 )             44.6     04-12    136  |  101  |  50<H>  ----------------------------<  121<H>  4.7   |  24  |  2.29<H>    Ca    9.4      12 Apr 2020 06:20  Phos  4.2     04-12  Mg     2.0     04-12      proBNP:   Lipid Profile:   HgA1c:   TSH:

## 2020-04-12 NOTE — DISCHARGE NOTE PROVIDER - NSDCMRMEDTOKEN_GEN_ALL_CORE_FT
albuterol 90 mcg/inh inhalation aerosol: 2 puff(s) inhaled every 6 hours  amLODIPine 10 mg oral tablet: 1 tab(s) orally once a day   aspirin 81 mg oral tablet: 1 tab(s) orally once a day  clonidine 0.1 mg oral tablet: 2 tab(s) orally 2 times a day  doxazosin 2 mg oral tablet: 1 tab(s) orally once a day  hydrALAZINE 25 mg oral tablet: 1 tab(s) orally 2 times a day  labetalol 100 mg oral tablet: 3 tab(s) orally 2 times a day  Spiriva 18 mcg inhalation capsule: 1 each inhaled once a day  Symbicort 160 mcg-4.5 mcg/inh inhalation aerosol: 2 puff(s) inhaled 2 times a day  Toujeo SoloStar 300 units/mL subcutaneous solution: 18 unit(s) subcutaneous once a day

## 2020-04-12 NOTE — PROGRESS NOTE ADULT - NSREFPHYEXREFTO_GEN_ALL_CORE
Inpatient Physical Exam

## 2020-04-12 NOTE — PROGRESS NOTE ADULT - REASON FOR ADMISSION
pneumonia, resp failure

## 2020-04-12 NOTE — DISCHARGE NOTE NURSING/CASE MANAGEMENT/SOCIAL WORK - PATIENT PORTAL LINK FT
You can access the FollowMyHealth Patient Portal offered by [facility] by registering at the following website: [email or web address]. By joining [facility]’s FollowMyHealth portal, you will also be able to view your health information using other applications (apps) compatible with our system.

## 2020-04-12 NOTE — DISCHARGE NOTE PROVIDER - HOSPITAL COURSE
64yo M with HTN, T2DM on insulin, HLD, copd presents with shortness of breath Admitted w/ COVID pneumonia.  COVID PCR positive, chest x-ray performed revealed bilateral infiltrates.  Inpatient patient was treated with supplemental oxygen, hydroxychloroquine, solumedrol and anakinra.  Hemoglobin A1C A1C on admission 8.9, endocrine consulted in setting of steroid induced hyperglycemia and insulin adjusted according to blood glucose.  Pulmonary followed patient for COVID pneu monia.  on 4/12 patient stable for discharge home to self isolate, discussed with Dr. Pepe, pulmonary and endocrine.  Prescriptions sent to patients preferred pharmacy

## 2020-04-12 NOTE — CHART NOTE - NSCHARTNOTEFT_GEN_A_CORE
Per current hospital medicine emergency protocol, in an effort to reduce COVID exposures and also conserve PPE for necessary encounters, H&P below is obtained from chart review, verbal discussion with patient, medical team and nursing staff if applicable, without direct patient contact unless acute changes.     66yo M with HTN, T2DM on insulin, HLD, COPD presents with shortness of breath x 1 day. Admitted for suspected COVID pneumonia vs. CHF exacerbation. Endocrine consulted for diabetes mellitus with severe exacerbation due to steroid dosing, now off steroids since 4/7 AM and running tightly controlled    Glucose tightly controlled on Lantus 20 units only, no pre-meal Humalog has been needed   Called for discharge recommendations   Would STOP Metformin for discharge given NANCY  Patient was on Toujeo at home (60 units) - cannot resume that dose  Would decrease to 18 units for discharge (receiving 20 units now and still tightly controlled)  Inpatient BG target 100-180 mg/dl  Discharge Plan: See above, Toujeo only - 18 units   Please ask patient to check fingersticks at home   As NANCY resolves, will likely need more insulin - possibly even the additional of bolus insulin as A1c was not controlled   Patient should follow with prior endocrine provider or can establish care by calling     D/W Hemalatha Cummings (NP)    Endocrine can be contacted at [phone]    MEDICATIONS  (STANDING):  ALBUTerol    90 MICROgram(s) HFA Inhaler 2 Puff(s) Inhalation every 6 hours  amLODIPine   Tablet 10 milliGRAM(s) Oral daily  anakinra Injectable 100 milliGRAM(s) SubCutaneous daily  aspirin  chewable 81 milliGRAM(s) Oral daily  budesonide 160 MICROgram(s)/formoterol 4.5 MICROgram(s) Inhaler 2 Puff(s) Inhalation two times a day  cloNIDine 0.2 milliGRAM(s) Oral two times a day  dextrose 5%. 1000 milliLiter(s) (50 mL/Hr) IV Continuous <Continuous>  dextrose 50% Injectable 12.5 Gram(s) IV Push once  dextrose 50% Injectable 25 Gram(s) IV Push once  dextrose 50% Injectable 25 Gram(s) IV Push once  heparin  Injectable 5000 Unit(s) SubCutaneous every 8 hours  hydrALAZINE 25 milliGRAM(s) Oral two times a day  insulin glargine Injectable (LANTUS) 20 Unit(s) SubCutaneous at bedtime  insulin lispro (HumaLOG) corrective regimen sliding scale   SubCutaneous three times a day before meals  insulin lispro (HumaLOG) corrective regimen sliding scale   SubCutaneous at bedtime  labetalol 300 milliGRAM(s) Oral two times a day  tiotropium 18 MICROgram(s) Capsule 1 Capsule(s) Inhalation daily    CAPILLARY BLOOD GLUCOSE  POCT Blood Glucose.: 101 mg/dL (12 Apr 2020 08:07)  POCT Blood Glucose.: 110 mg/dL (12 Apr 2020 00:15)  POCT Blood Glucose.: 121 mg/dL (11 Apr 2020 16:55)  POCT Blood Glucose.: 161 mg/dL (11 Apr 2020 12:01)    Hemoglobin A1C, Whole Blood: 8.9 % (04-02-20 @ 17:00)  Hemoglobin A1C, Whole Blood: 8.7 % (04-02-20 @ 14:15)

## 2020-04-12 NOTE — PROGRESS NOTE ADULT - SUBJECTIVE AND OBJECTIVE BOX
Patient is a 65y old  Male who presents with a chief complaint of pneumonia, resp failure (12 Apr 2020 11:26)      Any change in ROS: he is alert and awakle: and is currently off oxygen  :  seems pretty good:       MEDICATIONS  (STANDING):  ALBUTerol    90 MICROgram(s) HFA Inhaler 2 Puff(s) Inhalation every 6 hours  amLODIPine   Tablet 10 milliGRAM(s) Oral daily  aspirin  chewable 81 milliGRAM(s) Oral daily  budesonide 160 MICROgram(s)/formoterol 4.5 MICROgram(s) Inhaler 2 Puff(s) Inhalation two times a day  cloNIDine 0.2 milliGRAM(s) Oral two times a day  dextrose 5%. 1000 milliLiter(s) (50 mL/Hr) IV Continuous <Continuous>  dextrose 50% Injectable 12.5 Gram(s) IV Push once  dextrose 50% Injectable 25 Gram(s) IV Push once  dextrose 50% Injectable 25 Gram(s) IV Push once  heparin  Injectable 5000 Unit(s) SubCutaneous every 8 hours  hydrALAZINE 25 milliGRAM(s) Oral two times a day  insulin glargine Injectable (LANTUS) 20 Unit(s) SubCutaneous at bedtime  insulin lispro (HumaLOG) corrective regimen sliding scale   SubCutaneous three times a day before meals  insulin lispro (HumaLOG) corrective regimen sliding scale   SubCutaneous at bedtime  labetalol 300 milliGRAM(s) Oral two times a day  tiotropium 18 MICROgram(s) Capsule 1 Capsule(s) Inhalation daily    MEDICATIONS  (PRN):  dextrose 40% Gel 15 Gram(s) Oral once PRN Blood Glucose LESS THAN 70 milliGRAM(s)/deciliter  glucagon  Injectable 1 milliGRAM(s) IntraMuscular once PRN Glucose LESS THAN 70 milligrams/deciliter    Vital Signs Last 24 Hrs  T(C): 36.6 (12 Apr 2020 05:22), Max: 36.6 (12 Apr 2020 00:14)  T(F): 97.9 (12 Apr 2020 05:22), Max: 97.9 (12 Apr 2020 05:22)  HR: 80 (12 Apr 2020 05:22) (71 - 82)  BP: 118/60 (12 Apr 2020 05:22) (118/60 - 138/78)  BP(mean): --  RR: 18 (12 Apr 2020 11:01) (18 - 19)  SpO2: 95% (12 Apr 2020 11:01) (94% - 97%)    I&O's Summary        Physical Exam:   GENERAL: NAD, well-groomed, well-developed  HEENT: PABLITO/   Atraumatic, Normocephalic  ENMT: No tonsillar erythema, exudates, or enlargement; Moist mucous membranes, Good dentition, No lesions  NECK: Supple, No JVD, Normal thyroid  CHEST/LUNG: Clear to auscultaion, ; No rales, rhonchi, wheezing, or rubs  CVS: Regular rate and rhythm; No murmurs, rubs, or gallops  GI: : Soft, Nontender, Nondistended; Bowel sounds present  NERVOUS SYSTEM:  Alert & Oriented X3  EXTREMITIES:  2+ Peripheral Pulses, No clubbing, cyanosis, or edema  LYMPH: No lymphadenopathy noted  SKIN: No rashes or lesions  ENDOCRINOLOGY: No Thyromegaly  PSYCH: Appropriate    Labs:                              14.0   9.29  )-----------( 229      ( 12 Apr 2020 06:24 )             44.6                         15.7   9.47  )-----------( 313      ( 10 Apr 2020 07:20 )             49.0     04-12    136  |  101  |  50<H>  ----------------------------<  121<H>  4.7   |  24  |  2.29<H>  04-11    142  |  104  |  52<H>  ----------------------------<  111<H>  4.9   |  23  |  2.06<H>  04-10    143  |  106  |  60<H>  ----------------------------<  132<H>  4.9   |  25  |  2.16<H>  04-09    146<H>  |  109<H>  |  76<H>  ----------------------------<  97  4.6   |  25  |  2.51<H>    Ca    9.4      12 Apr 2020 06:20  Ca    9.7      11 Apr 2020 03:40  Phos  4.2     04-12  Phos  3.9     04-11  Mg     2.0     04-12  Mg     2.1     04-11      CAPILLARY BLOOD GLUCOSE      POCT Blood Glucose.: 159 mg/dL (12 Apr 2020 12:52)  POCT Blood Glucose.: 101 mg/dL (12 Apr 2020 08:07)  POCT Blood Glucose.: 110 mg/dL (12 Apr 2020 00:15)  POCT Blood Glucose.: 121 mg/dL (11 Apr 2020 16:55)    < from: CT Chest No Cont (04.02.20 @ 11:17) >    PROCEDURE:   CT of the Chest was performed without intravenous contrast.  Sagittal andcoronal reformats were performed.      FINDINGS:    LUNGS AND AIRWAYS: Patent central airways.  Evidence of bilateral predominantly groundglass opacity infiltrates with crazy paving. Differential includes but is not limited to atypical pneumonia. Bronchiectasis involving the lower lobes bilaterally, right more than left.    PLEURA: No pleural effusion.    MEDIASTINUM AND MARYA: Mild mediastinal lymphadenopathy, slightly progressed.    VESSELS: Coronary artery calcification. Stable enlarged main pulmonary artery measuring approximately 3.4 cm. Stable dilatation of the ascending thoracic aorta measuring 4.1 cm.    HEART: Mild cardiomegaly. No pericardial effusion.    CHEST WALL AND LOWER NECK: Left thyroid nodules with substernal extension, similar to the prior study. No tracheal compression.    VISUALIZED UPPER ABDOMEN: Prior cholecystectomy. Surgical clips related to the right hepatic lobe.    BONES: Within normal limits.    IMPRESSION:     Bilateral predominantly groundglass opacity infiltrates without associated pleural effusions. Differential includes but is not limited to atypical pneumonia. Correlate clinically.    Additional findings as above.                          RADHA DURHAM M.D., ATTENDING RADIOLOGIST  This document has been electronically signed. Apr 2 2020 11:44AM        < end of copied text >          D-Dimer Assay, Quantitative: 514 ng/mL (04-08 @ 19:00)  D-Dimer Assay, Quantitative: 486 ng/mL (04-05 @ 18:23)        RECENT CULTURES:        RESPIRATORY CULTURES:          Studies  Chest X-RAY  CT SCAN Chest   Venous Dopplers: LE:   CT Abdomen  Others

## 2020-04-12 NOTE — PROGRESS NOTE ADULT - SUBJECTIVE AND OBJECTIVE BOX
CARDIOLOGY FOLLOW UP - Dr. South    CC no acute events   stable on R/A       PHYSICAL EXAM:  T(C): 36.6 (04-12-20 @ 05:22), Max: 36.6 (04-12-20 @ 00:14)  HR: 80 (04-12-20 @ 05:22) (71 - 82)  BP: 118/60 (04-12-20 @ 05:22) (118/60 - 138/78)  RR: 18 (04-12-20 @ 05:22) (18 - 19)  SpO2: 96% (04-12-20 @ 05:22) (94% - 97%)  Wt(kg): --  I&O's Summary          MEDICATIONS  (STANDING):  ALBUTerol    90 MICROgram(s) HFA Inhaler 2 Puff(s) Inhalation every 6 hours  amLODIPine   Tablet 10 milliGRAM(s) Oral daily  anakinra Injectable 100 milliGRAM(s) SubCutaneous daily  aspirin  chewable 81 milliGRAM(s) Oral daily  budesonide 160 MICROgram(s)/formoterol 4.5 MICROgram(s) Inhaler 2 Puff(s) Inhalation two times a day  cloNIDine 0.2 milliGRAM(s) Oral two times a day  dextrose 5%. 1000 milliLiter(s) (50 mL/Hr) IV Continuous <Continuous>  dextrose 50% Injectable 12.5 Gram(s) IV Push once  dextrose 50% Injectable 25 Gram(s) IV Push once  dextrose 50% Injectable 25 Gram(s) IV Push once  heparin  Injectable 5000 Unit(s) SubCutaneous every 8 hours  hydrALAZINE 25 milliGRAM(s) Oral two times a day  insulin glargine Injectable (LANTUS) 20 Unit(s) SubCutaneous at bedtime  insulin lispro (HumaLOG) corrective regimen sliding scale   SubCutaneous three times a day before meals  insulin lispro (HumaLOG) corrective regimen sliding scale   SubCutaneous at bedtime  labetalol 300 milliGRAM(s) Oral two times a day  tiotropium 18 MICROgram(s) Capsule 1 Capsule(s) Inhalation daily      TELEMETRY: 	    ECG:  	  RADIOLOGY:   DIAGNOSTIC TESTING:  [ ] Echocardiogram:  [ ]  Catheterization:  [ ] Stress Test:    OTHER: 	    LABS:	 	                                14.0   9.29  )-----------( 229      ( 12 Apr 2020 06:24 )             44.6     04-12    136  |  101  |  50<H>  ----------------------------<  121<H>  4.7   |  24  |  2.29<H>    Ca    9.4      12 Apr 2020 06:20  Phos  4.2     04-12  Mg     2.0     04-12

## 2020-09-02 ENCOUNTER — APPOINTMENT (OUTPATIENT)
Dept: OPHTHALMOLOGY | Facility: CLINIC | Age: 65
End: 2020-09-02
Payer: COMMERCIAL

## 2020-09-02 ENCOUNTER — NON-APPOINTMENT (OUTPATIENT)
Age: 65
End: 2020-09-02

## 2020-09-02 PROCEDURE — 92134 CPTRZ OPH DX IMG PST SGM RTA: CPT

## 2020-09-02 PROCEDURE — 92014 COMPRE OPH EXAM EST PT 1/>: CPT

## 2020-10-07 ENCOUNTER — NON-APPOINTMENT (OUTPATIENT)
Age: 65
End: 2020-10-07

## 2020-10-07 ENCOUNTER — APPOINTMENT (OUTPATIENT)
Dept: OPHTHALMOLOGY | Facility: CLINIC | Age: 65
End: 2020-10-07
Payer: COMMERCIAL

## 2020-10-07 PROCEDURE — 92014 COMPRE OPH EXAM EST PT 1/>: CPT

## 2020-10-07 PROCEDURE — 92136 OPHTHALMIC BIOMETRY: CPT | Mod: RT

## 2020-10-07 PROCEDURE — 92134 CPTRZ OPH DX IMG PST SGM RTA: CPT

## 2020-11-04 DIAGNOSIS — Z01.818 ENCOUNTER FOR OTHER PREPROCEDURAL EXAMINATION: ICD-10-CM

## 2020-11-05 ENCOUNTER — APPOINTMENT (OUTPATIENT)
Dept: DISASTER EMERGENCY | Facility: CLINIC | Age: 65
End: 2020-11-05

## 2020-11-07 LAB — SARS-COV-2 N GENE NPH QL NAA+PROBE: NOT DETECTED

## 2021-01-09 DIAGNOSIS — Z01.818 ENCOUNTER FOR OTHER PREPROCEDURAL EXAMINATION: ICD-10-CM

## 2021-01-10 ENCOUNTER — APPOINTMENT (OUTPATIENT)
Dept: DISASTER EMERGENCY | Facility: CLINIC | Age: 66
End: 2021-01-10

## 2021-01-11 LAB — SARS-COV-2 N GENE NPH QL NAA+PROBE: NOT DETECTED

## 2021-01-12 ENCOUNTER — APPOINTMENT (OUTPATIENT)
Dept: OPHTHALMOLOGY | Facility: AMBULATORY SURGERY CENTER | Age: 66
End: 2021-01-12
Payer: COMMERCIAL

## 2021-01-12 ENCOUNTER — NON-APPOINTMENT (OUTPATIENT)
Age: 66
End: 2021-01-12

## 2021-01-12 PROCEDURE — 66984 XCAPSL CTRC RMVL W/O ECP: CPT | Mod: RT

## 2021-01-13 ENCOUNTER — NON-APPOINTMENT (OUTPATIENT)
Age: 66
End: 2021-01-13

## 2021-01-13 ENCOUNTER — APPOINTMENT (OUTPATIENT)
Dept: OPHTHALMOLOGY | Facility: CLINIC | Age: 66
End: 2021-01-13
Payer: COMMERCIAL

## 2021-01-13 PROCEDURE — 99024 POSTOP FOLLOW-UP VISIT: CPT

## 2021-01-20 ENCOUNTER — APPOINTMENT (OUTPATIENT)
Dept: OPHTHALMOLOGY | Facility: CLINIC | Age: 66
End: 2021-01-20
Payer: COMMERCIAL

## 2021-01-20 ENCOUNTER — NON-APPOINTMENT (OUTPATIENT)
Age: 66
End: 2021-01-20

## 2021-01-20 PROCEDURE — 99024 POSTOP FOLLOW-UP VISIT: CPT

## 2021-02-10 ENCOUNTER — APPOINTMENT (OUTPATIENT)
Dept: OPHTHALMOLOGY | Facility: CLINIC | Age: 66
End: 2021-02-10

## 2021-02-12 ENCOUNTER — APPOINTMENT (OUTPATIENT)
Dept: OPHTHALMOLOGY | Facility: CLINIC | Age: 66
End: 2021-02-12
Payer: COMMERCIAL

## 2021-02-12 ENCOUNTER — NON-APPOINTMENT (OUTPATIENT)
Age: 66
End: 2021-02-12

## 2021-02-12 PROCEDURE — 99024 POSTOP FOLLOW-UP VISIT: CPT

## 2021-02-12 PROCEDURE — 92136 OPHTHALMIC BIOMETRY: CPT | Mod: 26,LT

## 2021-03-05 ENCOUNTER — APPOINTMENT (OUTPATIENT)
Dept: DISASTER EMERGENCY | Facility: CLINIC | Age: 66
End: 2021-03-05

## 2021-03-06 LAB — SARS-COV-2 N GENE NPH QL NAA+PROBE: NOT DETECTED

## 2021-03-09 ENCOUNTER — NON-APPOINTMENT (OUTPATIENT)
Age: 66
End: 2021-03-09

## 2021-03-09 ENCOUNTER — APPOINTMENT (OUTPATIENT)
Dept: OPHTHALMOLOGY | Facility: AMBULATORY SURGERY CENTER | Age: 66
End: 2021-03-09
Payer: COMMERCIAL

## 2021-03-09 PROCEDURE — 66984 XCAPSL CTRC RMVL W/O ECP: CPT | Mod: LT,79

## 2021-03-10 ENCOUNTER — APPOINTMENT (OUTPATIENT)
Dept: OPHTHALMOLOGY | Facility: CLINIC | Age: 66
End: 2021-03-10
Payer: COMMERCIAL

## 2021-03-10 ENCOUNTER — NON-APPOINTMENT (OUTPATIENT)
Age: 66
End: 2021-03-10

## 2021-03-10 PROCEDURE — 99024 POSTOP FOLLOW-UP VISIT: CPT

## 2021-03-17 ENCOUNTER — APPOINTMENT (OUTPATIENT)
Dept: OPHTHALMOLOGY | Facility: CLINIC | Age: 66
End: 2021-03-17
Payer: COMMERCIAL

## 2021-03-17 ENCOUNTER — NON-APPOINTMENT (OUTPATIENT)
Age: 66
End: 2021-03-17

## 2021-03-17 PROCEDURE — 99024 POSTOP FOLLOW-UP VISIT: CPT

## 2021-04-09 ENCOUNTER — NON-APPOINTMENT (OUTPATIENT)
Age: 66
End: 2021-04-09

## 2021-04-09 ENCOUNTER — APPOINTMENT (OUTPATIENT)
Dept: OPHTHALMOLOGY | Facility: CLINIC | Age: 66
End: 2021-04-09
Payer: COMMERCIAL

## 2021-04-09 PROCEDURE — 99024 POSTOP FOLLOW-UP VISIT: CPT

## 2021-05-12 ENCOUNTER — APPOINTMENT (OUTPATIENT)
Dept: OPHTHALMOLOGY | Facility: CLINIC | Age: 66
End: 2021-05-12
Payer: COMMERCIAL

## 2021-05-12 ENCOUNTER — NON-APPOINTMENT (OUTPATIENT)
Age: 66
End: 2021-05-12

## 2021-05-12 PROCEDURE — 92134 CPTRZ OPH DX IMG PST SGM RTA: CPT

## 2021-05-12 PROCEDURE — 99024 POSTOP FOLLOW-UP VISIT: CPT

## 2021-07-15 ENCOUNTER — EMERGENCY (EMERGENCY)
Facility: HOSPITAL | Age: 66
LOS: 1 days | Discharge: HOME | End: 2021-07-15
Attending: EMERGENCY MEDICINE | Admitting: STUDENT IN AN ORGANIZED HEALTH CARE EDUCATION/TRAINING PROGRAM
Payer: COMMERCIAL

## 2021-07-15 VITALS
SYSTOLIC BLOOD PRESSURE: 151 MMHG | HEART RATE: 98 BPM | TEMPERATURE: 98 F | OXYGEN SATURATION: 98 % | DIASTOLIC BLOOD PRESSURE: 84 MMHG | RESPIRATION RATE: 16 BRPM

## 2021-07-15 VITALS
DIASTOLIC BLOOD PRESSURE: 90 MMHG | TEMPERATURE: 98 F | HEART RATE: 110 BPM | OXYGEN SATURATION: 95 % | RESPIRATION RATE: 18 BRPM | HEIGHT: 73 IN | SYSTOLIC BLOOD PRESSURE: 151 MMHG

## 2021-07-15 DIAGNOSIS — Z98.89 OTHER SPECIFIED POSTPROCEDURAL STATES: Chronic | ICD-10-CM

## 2021-07-15 LAB
ALBUMIN SERPL ELPH-MCNC: 3.7 G/DL — SIGNIFICANT CHANGE UP (ref 3.3–5)
ALP SERPL-CCNC: 106 U/L — SIGNIFICANT CHANGE UP (ref 40–120)
ALT FLD-CCNC: 22 U/L — SIGNIFICANT CHANGE UP (ref 4–41)
ANION GAP SERPL CALC-SCNC: 14 MMOL/L — SIGNIFICANT CHANGE UP (ref 7–14)
AST SERPL-CCNC: 14 U/L — SIGNIFICANT CHANGE UP (ref 4–40)
BASOPHILS # BLD AUTO: 0.04 K/UL — SIGNIFICANT CHANGE UP (ref 0–0.2)
BASOPHILS NFR BLD AUTO: 0.5 % — SIGNIFICANT CHANGE UP (ref 0–2)
BILIRUB SERPL-MCNC: 0.4 MG/DL — SIGNIFICANT CHANGE UP (ref 0.2–1.2)
BUN SERPL-MCNC: 16 MG/DL — SIGNIFICANT CHANGE UP (ref 7–23)
CALCIUM SERPL-MCNC: 8.7 MG/DL — SIGNIFICANT CHANGE UP (ref 8.4–10.5)
CHLORIDE SERPL-SCNC: 106 MMOL/L — SIGNIFICANT CHANGE UP (ref 98–107)
CO2 SERPL-SCNC: 24 MMOL/L — SIGNIFICANT CHANGE UP (ref 22–31)
CREAT SERPL-MCNC: 1.62 MG/DL — HIGH (ref 0.5–1.3)
EOSINOPHIL # BLD AUTO: 0.21 K/UL — SIGNIFICANT CHANGE UP (ref 0–0.5)
EOSINOPHIL NFR BLD AUTO: 2.5 % — SIGNIFICANT CHANGE UP (ref 0–6)
GLUCOSE SERPL-MCNC: 180 MG/DL — HIGH (ref 70–99)
HCT VFR BLD CALC: 43.4 % — SIGNIFICANT CHANGE UP (ref 39–50)
HGB BLD-MCNC: 13.8 G/DL — SIGNIFICANT CHANGE UP (ref 13–17)
IANC: 5.46 K/UL — SIGNIFICANT CHANGE UP (ref 1.5–8.5)
IMM GRANULOCYTES NFR BLD AUTO: 0.5 % — SIGNIFICANT CHANGE UP (ref 0–1.5)
LYMPHOCYTES # BLD AUTO: 1.12 K/UL — SIGNIFICANT CHANGE UP (ref 1–3.3)
LYMPHOCYTES # BLD AUTO: 13.5 % — SIGNIFICANT CHANGE UP (ref 13–44)
MCHC RBC-ENTMCNC: 27.5 PG — SIGNIFICANT CHANGE UP (ref 27–34)
MCHC RBC-ENTMCNC: 31.8 GM/DL — LOW (ref 32–36)
MCV RBC AUTO: 86.5 FL — SIGNIFICANT CHANGE UP (ref 80–100)
MONOCYTES # BLD AUTO: 1.41 K/UL — HIGH (ref 0–0.9)
MONOCYTES NFR BLD AUTO: 17 % — HIGH (ref 2–14)
NEUTROPHILS # BLD AUTO: 5.46 K/UL — SIGNIFICANT CHANGE UP (ref 1.8–7.4)
NEUTROPHILS NFR BLD AUTO: 66 % — SIGNIFICANT CHANGE UP (ref 43–77)
NRBC # BLD: 0 /100 WBCS — SIGNIFICANT CHANGE UP
NRBC # FLD: 0 K/UL — SIGNIFICANT CHANGE UP
PLATELET # BLD AUTO: 197 K/UL — SIGNIFICANT CHANGE UP (ref 150–400)
POTASSIUM SERPL-MCNC: 3.6 MMOL/L — SIGNIFICANT CHANGE UP (ref 3.5–5.3)
POTASSIUM SERPL-SCNC: 3.6 MMOL/L — SIGNIFICANT CHANGE UP (ref 3.5–5.3)
PROT SERPL-MCNC: 6.8 G/DL — SIGNIFICANT CHANGE UP (ref 6–8.3)
RBC # BLD: 5.02 M/UL — SIGNIFICANT CHANGE UP (ref 4.2–5.8)
RBC # FLD: 13 % — SIGNIFICANT CHANGE UP (ref 10.3–14.5)
SODIUM SERPL-SCNC: 144 MMOL/L — SIGNIFICANT CHANGE UP (ref 135–145)
WBC # BLD: 8.28 K/UL — SIGNIFICANT CHANGE UP (ref 3.8–10.5)
WBC # FLD AUTO: 8.28 K/UL — SIGNIFICANT CHANGE UP (ref 3.8–10.5)

## 2021-07-15 PROCEDURE — 99285 EMERGENCY DEPT VISIT HI MDM: CPT

## 2021-07-15 PROCEDURE — 74177 CT ABD & PELVIS W/CONTRAST: CPT | Mod: 26

## 2021-07-15 RX ORDER — SODIUM CHLORIDE 9 MG/ML
1000 INJECTION INTRAMUSCULAR; INTRAVENOUS; SUBCUTANEOUS ONCE
Refills: 0 | Status: COMPLETED | OUTPATIENT
Start: 2021-07-15 | End: 2021-07-15

## 2021-07-15 RX ADMIN — SODIUM CHLORIDE 1000 MILLILITER(S): 9 INJECTION INTRAMUSCULAR; INTRAVENOUS; SUBCUTANEOUS at 11:35

## 2021-07-15 RX ADMIN — SODIUM CHLORIDE 1000 MILLILITER(S): 9 INJECTION INTRAMUSCULAR; INTRAVENOUS; SUBCUTANEOUS at 12:40
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
## 2021-07-15 NOTE — ED PROVIDER NOTE - PHYSICAL EXAMINATION
CONSTITUTIONAL: well-appearing, in NAD  SKIN: 3cm raised area of healing skin close to 6cm RUQ abdominal scar, linear vertical ex lap scar over abdomen  HEAD: NCAT  EYES: EOMI, PERRLA, no scleral icterus, conjunctiva pink  ENT: normal pharynx with no erythema or exudates  NECK: Supple; non tender. Full ROM.  CARD: RRR, no murmurs.  RESP: clear to ausculation b/l. No crackles or wheezing.  ABD: soft, non-tender, non-distended, no rebound or guarding.  EXT: Full ROM, no bony tenderness, no pedal edema, no calf tenderness.  NEURO: normal motor. normal sensory. CN II-XII intact. Cerebellar testing normal. Normal gait.  PSYCH: Cooperative, appropriate.

## 2021-07-15 NOTE — ED ADULT TRIAGE NOTE - CHIEF COMPLAINT QUOTE
states" I am having pain in my right side since few days with nausea " vomited few days ago. h/o DM, COVID in April , gall bladder removed 4 years ago.,

## 2021-07-15 NOTE — CONSULT NOTE ADULT - SUBJECTIVE AND OBJECTIVE BOX
CARDIOLOGY CONSULT - Dr. South         HPI:    67yo M well known to practice with pmhx of HTN, Aortic Regurgitation , Diastolic Heart Failure, CBD Stone, s/p Stent, PAD, Coronavirus 3/2020, Chronic Kidney Disease presents with a few days of abd pain.  He notes poor appetite and has NV associated.  Patient denies any chest pain, dyspnea, palpitations, cough, syncope, edema, exertional symptoms, fever, chills,  or rash.  Most recent office echo noted with nl lv sys fx, mod concentric LVH, mod AR, mild-mod TR.    PAST MEDICAL & SURGICAL HISTORY:  Hypertension    Diabetes  fs this am 120    COPD (chronic obstructive pulmonary disease)    Cholelithiasis    Heart murmur  recent dx    S/P exploratory laparotomy  23 years ago            PREVIOUS DIAGNOSTIC TESTING:    [x ] Echocardiogram: Office Echo 5/13/20  Observations:  Aortic Valve: Calcified tri-leaflet aortic valve with normal systolic opening. Moderate aortic regurgitation seen.  Aorta: Normal aortic root.   Mitral Valve: Normal mitral valve. Normal mitral leaflets with normal diastolic opening.   Minimal mitral valve regurgitation seen.  Left Atrium: Enlarged left atrium.   Left Ventricle: Normal left ventricular size. Moderate concentric LVH.  Normal left ventricular systolic function. No segmental wall motion abnormalities seen.  Right Heart: Enlarged right atrium. Normal right ventricular size and systolic function.  Normal tricuspid valve. Mild-moderate tricuspid regurgitation.  Normal pulmonic valve. Minimal pulmonary valve regurgitation seen.   Pericardium/Pleura: Normal pericardium with no evidence of pericardial effusion.  Hemodynamic: PASP estimated at 45 mmHg, assuming right atrial pressure equals 10 mmHg.  Conclusions:  1. Calcified tri-leaflet aortic valve with normal systolic opening. Moderate aortic regurgitation.  2. Normal aortic root.  3. Minimal mitral regurgitation.  4. Moderate bi-atrial enlargement.  5. Normal left ventricular systolic function. Ejection fraction estimated at 60%.  6. Normal right ventricular size and systolic function.  7. Mild pulmonary hypertension (PASP = 45mmHg).  8. Moderate (Stage II) diastolic dysfunction  [ ]  Catheterization:  [ ] Stress Test:  	    MEDICATIONS:  Home Medications:  aspirin 81 mg oral tablet: 1 tab(s) orally once a day (02 Apr 2020 00:45)  clonidine 0.1 mg oral tablet: 2 tab(s) orally 2 times a day (02 Apr 2020 00:45)  doxazosin 2 mg oral tablet: 1 tab(s) orally once a day (02 Apr 2020 00:45)  labetalol 100 mg oral tablet: 3 tab(s) orally 2 times a day (02 Apr 2020 00:45)  Spiriva 18 mcg inhalation capsule: 1 each inhaled once a day (02 Apr 2020 00:45)  Symbicort 160 mcg-4.5 mcg/inh inhalation aerosol: 2 puff(s) inhaled 2 times a day (02 Apr 2020 00:45)      MEDICATIONS  (STANDING):      FAMILY HISTORY:  No pertinent family history in first degree relatives        SOCIAL HISTORY:    [ ] Non-smoker  [ ] Smoker  [ ] Alcohol    Allergies    No Known Allergies    Intolerances    	    REVIEW OF SYSTEMS:  CONSTITUTIONAL: No fever, weight loss, or fatigue  EYES: No eye pain, visual disturbances, or discharge  ENMT:  No difficulty hearing, tinnitus, vertigo; No sinus or throat pain  NECK: No pain or stiffness  RESPIRATORY: No cough, wheezing, chills or hemoptysis; No Shortness of Breath  CARDIOVASCULAR: No chest pain, palpitations, passing out, dizziness, or leg swelling  GASTROINTESTINAL: No abdominal or epigastric pain. No nausea, vomiting, or hematemesis; No diarrhea or constipation. No melena or hematochezia.  GENITOURINARY: No dysuria, frequency, hematuria, or incontinence  NEUROLOGICAL: No headaches, memory loss, loss of strength, numbness, or tremors  SKIN: No itching, burning, rashes, or lesions   	    [ ] All others negative	  [ ] Unable to obtain    PHYSICAL EXAM:  T(C): 36.8 (07-15-21 @ 09:57), Max: 36.8 (07-15-21 @ 09:57)  HR: 99 (07-15-21 @ 11:35) (99 - 110)  BP: 156/79 (07-15-21 @ 11:35) (151/90 - 156/79)  RR: 18 (07-15-21 @ 11:35) (18 - 18)  SpO2: 96% (07-15-21 @ 11:35) (95% - 96%)  Wt(kg): --  I&O's Summary      Appearance: Normal	  Psychiatry: A & O x 3, Mood & affect appropriate  HEENT:   Normal oral mucosa, PERRL, EOMI	  Lymphatic: No lymphadenopathy  Cardiovascular: Normal S1 S2,RRR, No JVD, No murmurs  Respiratory: Lungs clear to auscultation	  Gastrointestinal:  Soft, Non-tender, + BS	  Skin: No rashes, No ecchymoses, No cyanosis	  Neurologic: Non-focal  Extremities: Normal range of motion, No clubbing, cyanosis or edema  Vascular: Peripheral pulses palpable 2+ bilaterally    TELEMETRY: 	    ECG:  	  RADIOLOGY:  OTHER: 	  	  LABS:	 	    CARDIAC MARKERS:                                  13.8   8.28  )-----------( 197      ( 15 Jul 2021 11:38 )             43.4     07-15    144  |  106  |  16  ----------------------------<  180<H>  3.6   |  24  |  1.62<H>    Ca    8.7      15 Jul 2021 11:38    TPro  6.8  /  Alb  3.7  /  TBili  0.4  /  DBili  x   /  AST  14  /  ALT  22  /  AlkPhos  106  07-15      proBNP:   Lipid Profile:   HgA1c:   TSH:

## 2021-07-15 NOTE — ED PROVIDER NOTE - PATIENT PORTAL LINK FT
You can access the FollowMyHealth Patient Portal offered by [facility] by registering at the following website: [email or web address]. By joining [facility]’s FollowMyHealth portal, you will also be able to view your health information using other applications (apps) compatible with our system.

## 2021-07-15 NOTE — ED PROVIDER NOTE - CLINICAL SUMMARY MEDICAL DECISION MAKING FREE TEXT BOX
concern for abdominal abscess vs abdominal adhesions 2/2 surgery, cholelithiasis considered, ct abd pelvis w/ iv contrast, cbc cmp, pain control, consider abx if needed

## 2021-07-15 NOTE — ED PROVIDER NOTE - NSFOLLOWUPINSTRUCTIONS_ED_ALL_ED_FT
You were seen in the Emergency Department for abdominal pain.    Follow up with your surgeon if your pus drainage persists.    Follow up with your primary care provider within 3-5 days.    You may take 600mg Ibuprofen (Advil) once every 8 hours as needed for pain. See medication label for warnings and use instructions.    You may take 650 mg Tylenol (acetaminophen) every eight hours as needed for pain.    If you have worsening abdominal pain or diarrhea, fever, chills, nausea, vomiting, new or worsening pain, or if you have any new symptoms return to the Emergency Department.

## 2021-07-15 NOTE — ED PROVIDER NOTE - PROGRESS NOTE DETAILS
Dr. Colby: Cr 1.6 (improved from prior) and GFR 44 (cutoff for IV contrast is 30); clinically I am concerned and will get CT with contrast and hydrate pt Bari Amaya, DO PGY-2: Pts pain improved, ready to go home Emma MORALES: Received sign out from my colleague Dr. Colby pt presents w abdominal pain w c/f possible wall abscess pending CT at time of sign out CT w/o e/o abscess, however noted multiple incidental findings - pt informed of all of these and need to f/u w pmd - pt reports feels improved asking to be dc'd. Pt reassessed at bedside, feels well, pain controlled. Informed of workup in ED, reviewed lab and/or radiology results (when applicable) with patient/caregiver. Informed pt of plan for discharge with instructions to follow up with PMD. Pt/caregiver expressed understanding of plan and agrees with plan for discharge. Strict return precautions discussed with patient in layman's terms, patient demonstrated understanding of return precautions.

## 2021-07-15 NOTE — CONSULT NOTE ADULT - ASSESSMENT
Office Echo 5/13/20: nl lv sys fx, mod concentric LVH, mod AR, mild-mod TR     a/p  65yo M well known to practice with pmhx of HTN, Aortic Regurgitation , Diastolic Heart Failure, CBD Stone, s/p Stent, PAD, Coronavirus 3/2020, Chronic Kidney Disease presents with a few days of abd pain.     #Abd pain  -pt w hx of cbd stone   -pending ct a/p  -w/u per ED    #Aortic Regurgitation  -stable, recent echo revealed stable AI, mild-moderate    #Hypertension  -bp stable   -if admitted - resume labetalol 300mg TID, doxasozin 2mg daily in PM, clon 0.2mg BID, norvasc 10mg daily, and hydral 25mg BID  -remains off lisinopril pending labs due to recent NANCY    #Sinus Tachycardia  -ekg noted with ST - no acute ischemic changes  -likely in setting of dehydration - on IVF  -cont to monitor     #Chronic Diastolic Heart Failure  -stable  -resume lasix daily if admitted   -recent echo with normal lv function    #PAD  -stable, moderate distal right SFA disease  -resume ASA if admitted       ACP (Advanced Care Planning). Advanced care planning discussed with patient and/or family. Advanced care forms discussed with patient and/or family. Risks, benefits, and alternatives of medical/cardiac procedures were discussed in detail with all questions answered. Up to 30 minutes were spent addressing advanced care planning.    d/w ED MD

## 2021-07-15 NOTE — CONSULT NOTE ADULT - TIME BILLING
Patient seen and examined.  Agree with above NP note.  67yo M well known to practice with pmhx of HTN, Aortic Regurgitation , Diastolic Heart Failure, CBD Stone, s/p Stent, PAD, Coronavirus 3/2020, Chronic Kidney Disease presents with a few days of abd pain.   #Abd pain  -pt w hx of cbd stone   -ct a/p noted  -dispo per ed    #Aortic Regurgitation  -stable, recent echo revealed stable AI, mild-moderate    #Hypertension  -bp stable   -if admitted - resume labetalol 300mg TID, doxasozin 2mg daily in PM, clon 0.2mg BID, norvasc 10mg daily, and hydral 25mg BID  -remains off lisinopril pending labs due to recent NANCY    #Sinus Tachycardia  -ekg noted with ST - no acute ischemic changes  -likely in setting of dehydration - on IVF  -cont to monitor     #Chronic Diastolic Heart Failure  -stable  -resume lasix daily if admitted   -recent echo with normal lv function    #PAD  -stable, moderate distal right SFA disease  -resume ASA if admitted

## 2021-07-15 NOTE — ED ADULT NURSE NOTE - OBJECTIVE STATEMENT
Pt arriving to intake room 5 A&OX4 ambulatory c/o RLQ pain x 2 days. Pt had gallbladder removed 4 years ago, scar noted to RLQ. No drainage/redness noted. Pt endorsing nausea, vomiting, diarrhea x 2 days. Abdomen nondistended, tender in RLQ. Resp even and unlabored. NAD noted. 20g IV placed in RAC. Labs drawn and sent. Fluids running as ordered. Awaiting CT.

## 2021-07-15 NOTE — ED PROVIDER NOTE - ATTENDING CONTRIBUTION TO CARE
Dr. Colby: 65 yo male with PMH cholecystectomy, COPD, DM, HTN, in ED with drainage from RUQ chronic abscess near open cholecystectomy scar.  Pt also noted in ED that he has had skin discoloration to chest/back/neck that he thinks coincides with starting a new medication for his DM.  He notes recent N/V/D but no fever or other complaints.  On exam pt well appearing, in NAD, heart RRR, lungs CTAB, with chronic non-healing RUQ wound and well-healed scar beneath related to cholecystectomy, extremities without swelling, strength 5/5 in all extremities.  Chest/back/neck with hyperpigmented circular non-rasised lesions, no cellulitis or erythema.  Incidental skin rash consistent with possible tinea versicolor, does not appear to be tinea corporis or erythema multiforme or drug rash/rash associated with DRESS (but will check labs).

## 2021-09-02 ENCOUNTER — APPOINTMENT (OUTPATIENT)
Dept: SURGERY | Facility: CLINIC | Age: 66
End: 2021-09-02
Payer: COMMERCIAL

## 2021-09-02 VITALS
HEART RATE: 89 BPM | WEIGHT: 253 LBS | DIASTOLIC BLOOD PRESSURE: 90 MMHG | BODY MASS INDEX: 33.53 KG/M2 | HEIGHT: 73 IN | SYSTOLIC BLOOD PRESSURE: 155 MMHG

## 2021-09-02 DIAGNOSIS — I10 ESSENTIAL (PRIMARY) HYPERTENSION: ICD-10-CM

## 2021-09-02 DIAGNOSIS — E11.9 TYPE 2 DIABETES MELLITUS W/OUT COMPLICATIONS: ICD-10-CM

## 2021-09-02 DIAGNOSIS — T81.89XA OTHER COMPLICATIONS OF PROCEDURES, NOT ELSEWHERE CLASSIFIED, INITIAL ENCOUNTER: ICD-10-CM

## 2021-09-02 DIAGNOSIS — R10.9 UNSPECIFIED ABDOMINAL PAIN: ICD-10-CM

## 2021-09-02 PROCEDURE — 99203 OFFICE O/P NEW LOW 30 MIN: CPT | Mod: 25

## 2021-09-02 PROCEDURE — 20520 RMVL FB MUSC/TDN SIMPLE: CPT | Mod: 59

## 2021-09-02 PROCEDURE — 20525 RMVL FB MUSC/TDN DEEP/COMP: CPT

## 2021-09-02 RX ORDER — DOXYCLYCLINE HYCLATE 150 MG/1
TABLET, COATED ORAL
Refills: 0 | Status: ACTIVE | COMMUNITY

## 2021-09-02 RX ORDER — HYDRALAZINE HYDROCHLORIDE 50 MG/1
TABLET ORAL
Refills: 0 | Status: ACTIVE | COMMUNITY
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
## 2021-09-02 NOTE — DATA REVIEWED
[FreeTextEntry1] :  EXAM:  CT ABDOMEN AND PELVIS IC  \par \par \par PROCEDURE DATE:  Jul 15 2021 \par \par \par \par INTERPRETATION:  CLINICAL INFORMATION: Follow pain, acute, right-sided, nausea. History of cholecystectomy.\par \par COMPARISON: CT abdomen pelvis 11/9/2015.\par \par CONTRAST/COMPLICATIONS:\par IV Contrast: Omnipaque 350  90 cc administered   10 cc discarded\par Oral Contrast: NONE\par Complications: None reported at time of study completion\par \par PROCEDURE:\par CT of the Abdomen and Pelvis was performed.\par Sagittal and coronal reformats were performed.\par \par FINDINGS:\par \par LOWER CHEST: Peripheral reticulation and scarring is noted of the right lower lobe. No pleural effusion of the visualized lung bases. Three-vessel coronary artery calcification. Aortic valve calcification also suggested.\par \par LIVER: Patent main portal vein. Postsurgical material associated with the posterior right hepatic lobe and posterior right perihepatic space redemonstrated.\par BILE DUCTS: Mild intrahepatic biliary ductal dilatation and common bile duct dilatation has increased since prior study from 10/9/2015. Thickening of the common bile duct wall also noted which was noted on the prior study from 2015.\par GALLBLADDER: Cholecystectomy.\par SPLEEN: Size within normal limits.\par PANCREAS: No main pancreatic ductal dilatation. 1.2 cm hypoattenuating focus of the pancreatic head/uncinate process, image 57 series 2. Follow-up evaluation on MRI/MRCP is recommended.\par ADRENALS: Rounded low-attenuation focus in the right adrenal gland is stable on multiple prior studies. Stable nodularity of the left adrenal gland.\par KIDNEYS/URETERS: No hydronephrosis of the kidneys. Focal atrophy of the right lower pole is noted. There are\par bilateral subcentimeter hypodensities which are too small to characterize.\par \par BLADDER: Within normal limits.\par REPRODUCTIVE ORGANS: Enlarged prostate measuring 6.2 cm transverse dimension. Correlate with urinalysis and PSA.\par \par BOWEL: No bowel obstruction. The appendix is filled with air and fluid, measuring 7-8 mm, similar to prior study from 2015. No significant surrounding inflammation or periappendiceal fluid identified. Clinical correlation is recommended.\par PERITONEUM: No ascites, free air, or loculated fluid collection. Mesenteric nodes are increased in prominence since prior study. For reference, there is a midabdominal mesenteric, measuring 1.3 cm, image 89 series 2, previously 0.6 cm.\par VESSELS: No aneurysm of the abdominal aorta. Moderate to severe changes are noted of the visceral arteries and iliofemoral arteries.\par RETROPERITONEUM/LYMPH NODES: Few prominent periportal nodes. Additional subcentimeter retroperitoneal nodes.\par ABDOMINAL WALL: Small fat-containing umbilical and inguinal hernias. Additional postsurgical changes of the abdominal wall.\par BONES: Degenerative changes of the bones.\par \par IMPRESSION:\par \par Post cholecystectomy with stable postoperative changes. Biliary distention has increased since prior study from 10/9/2015 with persistent common bile duct wall thickening. Correlate with LFTs and MRCP for further characterization.\par \par 1.2 cm apparent hypoattenuating focus of the pancreatic head/uncinate process, image 57 series 2. Follow-up evaluation on MRI/MRCP is recommended.\par \par Increased size of several mesenteric lymph nodes measuring up to 1.3 cm, nonspecific, in comparison prior CT from 2015. Follow-up CT abdomen pelvis is recommended 6 months to evaluate for stability.\par \par --- End of Report ---\par \par \par \par

## 2021-09-02 NOTE — CONSULT LETTER
[Dear  ___] : Dear  [unfilled], [Consult Letter:] : I had the pleasure of evaluating your patient, [unfilled]. [Consult Closing:] : Thank you very much for allowing me to participate in the care of this patient.  If you have any questions, please do not hesitate to contact me. [Sincerely,] : Sincerely, [FreeTextEntry3] : Dr Chamorro

## 2021-09-02 NOTE — PLAN
[FreeTextEntry1] : Procedure\par \par Suture granuloma of the right side of the abdomen, s/p open cholecystectomy was explored under LA and a deep prolene stitch was removed\par The base cauterised with silver nitrate. F/u prn and wound care discussed. Please follow up at the office within 3 month and as needed for any questions or concerns
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
## 2021-09-02 NOTE — PHYSICAL EXAM
[No Rash or Lesion] : No rash or lesion [Alert] : alert [Oriented to Person] : oriented to person [Oriented to Place] : oriented to place [Oriented to Time] : oriented to time [Calm] : calm [de-identified] : The patient is alert, well-groomed, well developed and cheerful.  [de-identified] : Breath sounds equal and bilateral, no wheezing no rales or rhonchi  [de-identified] :  good S1, S2, no m/r/g bilateral  [de-identified] : Normoactive bowel sounds, soft and nontender, no hepatosplenomegaly or masses noted, Patient has RUL healed scars with possible suture sinus \par   [de-identified] : WNL

## 2021-09-15 ENCOUNTER — NON-APPOINTMENT (OUTPATIENT)
Age: 66
End: 2021-09-15

## 2021-09-15 ENCOUNTER — APPOINTMENT (OUTPATIENT)
Dept: OPHTHALMOLOGY | Facility: CLINIC | Age: 66
End: 2021-09-15
Payer: COMMERCIAL

## 2021-09-15 PROCEDURE — 92014 COMPRE OPH EXAM EST PT 1/>: CPT

## 2021-09-15 PROCEDURE — 92134 CPTRZ OPH DX IMG PST SGM RTA: CPT

## 2021-10-06 PROBLEM — I10 ESSENTIAL HYPERTENSION: Status: ACTIVE | Noted: 2017-11-09

## 2021-11-17 NOTE — DIETITIAN INITIAL EVALUATION ADULT. - FEEDING SKILL
Teaching Attestation:  I have personally interviewed and examined the patient via face-to-face. I confirmed the findings listed below:    • Right lower quadrant pain  (primary encounter diagnosis)     This was discussed with the student and I agree with the assessment and plan as documented. I was present for the key portions of the procedure.The plan of care was discussed with the patient.     independent

## 2021-12-22 ENCOUNTER — APPOINTMENT (OUTPATIENT)
Dept: OPHTHALMOLOGY | Facility: CLINIC | Age: 66
End: 2021-12-22
Payer: COMMERCIAL

## 2021-12-22 ENCOUNTER — NON-APPOINTMENT (OUTPATIENT)
Age: 66
End: 2021-12-22

## 2021-12-22 PROCEDURE — 92014 COMPRE OPH EXAM EST PT 1/>: CPT

## 2021-12-22 PROCEDURE — 92134 CPTRZ OPH DX IMG PST SGM RTA: CPT

## 2021-12-27 ENCOUNTER — APPOINTMENT (OUTPATIENT)
Dept: SURGERY | Facility: CLINIC | Age: 66
End: 2021-12-27
Payer: COMMERCIAL

## 2021-12-27 VITALS — TEMPERATURE: 96.7 F

## 2021-12-27 PROCEDURE — 99213 OFFICE O/P EST LOW 20 MIN: CPT | Mod: 25

## 2021-12-27 PROCEDURE — 11403 EXC TR-EXT B9+MARG 2.1-3CM: CPT

## 2021-12-27 PROCEDURE — 12031 INTMD RPR S/A/T/EXT 2.5 CM/<: CPT

## 2021-12-27 NOTE — HISTORY OF PRESENT ILLNESS
[de-identified] : CIERA ROSA is a 66 year old male who presents in the office for mass on the upper back which is getting larger and painful\par He also gets pain radiating down the arm

## 2021-12-27 NOTE — PLAN
[FreeTextEntry1] : Procedure\par \par Preoperative diagnosis : Mass on the upper back 3.2 cm\par Post operative diagnosis : same\par Procedure : excision mass upper back\par \par The area was cleaned and prepped. Local anaesthesia 1% lidocaine with epinephrine was instilled in the skin and surrounding tissue The skin was incised with the scalpel and the mass was dissected out completely and excised. The wound was closed in layers. Tolerated the procedure. Post op instructions given\par Specimen sent for pathology\par \par

## 2021-12-27 NOTE — CONSULT LETTER
[Dear  ___] : Dear  [unfilled], [Consult Closing:] : Thank you very much for allowing me to participate in the care of this patient.  If you have any questions, please do not hesitate to contact me. [Sincerely,] : Sincerely, [FreeTextEntry3] : Naga Chamorro MD\par

## 2022-01-06 ENCOUNTER — APPOINTMENT (OUTPATIENT)
Dept: SURGERY | Facility: CLINIC | Age: 67
End: 2022-01-06
Payer: COMMERCIAL

## 2022-01-06 VITALS — TEMPERATURE: 97 F

## 2022-01-06 VITALS
DIASTOLIC BLOOD PRESSURE: 95 MMHG | WEIGHT: 255 LBS | HEIGHT: 73 IN | BODY MASS INDEX: 33.8 KG/M2 | SYSTOLIC BLOOD PRESSURE: 175 MMHG | HEART RATE: 94 BPM

## 2022-01-06 DIAGNOSIS — R22.2 LOCALIZED SWELLING, MASS AND LUMP, TRUNK: ICD-10-CM

## 2022-01-06 LAB — CORE LAB BIOPSY: NORMAL

## 2022-01-06 PROCEDURE — 99024 POSTOP FOLLOW-UP VISIT: CPT

## 2022-01-06 NOTE — PHYSICAL EXAM
[Alert] : alert [Oriented to Person] : oriented to person [Oriented to Place] : oriented to place [Oriented to Time] : oriented to time [Calm] : calm [de-identified] : The patient is alert, well-groomed  [de-identified] : full range of motion and no deformities appreciated.  [de-identified] : Surgical incision is healing well. Sutures were removed today.

## 2022-01-06 NOTE — REASON FOR VISIT
[Procedure: _________] : a [unfilled] procedure visit [FreeTextEntry1] : s/p excision mass upper back on 12/27/2021

## 2022-01-06 NOTE — ASSESSMENT
[FreeTextEntry1] : CIERA ROSA is a 66 year old male who underwent an excision mass upper back on 12/27/2021 pathology results are pending \par \par \par \par Patient is doing well. Surgical incision is healing well. Sutures were removed today.

## 2022-01-06 NOTE — HISTORY OF PRESENT ILLNESS
[de-identified] : CIERA ROSA is a 66 year old male who presents in the office for post procedure follow up visit. He underwent a excision mass upper back on 12/27/2021 pathology results are pending . Today patient is doing well, offers no complaints. Denies any fevers, chills, nausea, vomiting, diarrhea or constipation. Patient able to tolerate regular diet with normal bowel movements. Surgical incision is healing well. Sutures were removed today.

## 2022-01-06 NOTE — CONSULT LETTER
[Dear  ___] : Dear  [unfilled], [Courtesy Letter:] : I had the pleasure of seeing your patient, [unfilled], in my office today. [Consult Closing:] : Thank you very much for allowing me to participate in the care of this patient.  If you have any questions, please do not hesitate to contact me. [Sincerely,] : Sincerely, [FreeTextEntry3] : Dr Chamorro

## 2022-08-29 ENCOUNTER — EMERGENCY (EMERGENCY)
Facility: HOSPITAL | Age: 67
LOS: 1 days | Discharge: HOME | End: 2022-08-29
Attending: EMERGENCY MEDICINE | Admitting: EMERGENCY MEDICINE

## 2022-08-29 VITALS
SYSTOLIC BLOOD PRESSURE: 160 MMHG | HEIGHT: 73 IN | DIASTOLIC BLOOD PRESSURE: 88 MMHG | OXYGEN SATURATION: 95 % | TEMPERATURE: 98 F | RESPIRATION RATE: 18 BRPM | HEART RATE: 90 BPM

## 2022-08-29 DIAGNOSIS — Z98.89 OTHER SPECIFIED POSTPROCEDURAL STATES: Chronic | ICD-10-CM

## 2022-08-29 PROCEDURE — 99284 EMERGENCY DEPT VISIT MOD MDM: CPT

## 2022-08-29 RX ORDER — IBUPROFEN 200 MG
600 TABLET ORAL ONCE
Refills: 0 | Status: COMPLETED | OUTPATIENT
Start: 2022-08-29 | End: 2022-08-29

## 2022-08-29 RX ORDER — LIDOCAINE 4 G/100G
1 CREAM TOPICAL
Qty: 5 | Refills: 0
Start: 2022-08-29 | End: 2022-09-02

## 2022-08-29 RX ORDER — LIDOCAINE 4 G/100G
1 CREAM TOPICAL ONCE
Refills: 0 | Status: COMPLETED | OUTPATIENT
Start: 2022-08-29 | End: 2022-08-29

## 2022-08-29 RX ORDER — DIAZEPAM 5 MG
1 TABLET ORAL
Qty: 10 | Refills: 0
Start: 2022-08-29 | End: 2022-09-02

## 2022-08-29 RX ORDER — ACETAMINOPHEN 500 MG
650 TABLET ORAL ONCE
Refills: 0 | Status: COMPLETED | OUTPATIENT
Start: 2022-08-29 | End: 2022-08-29

## 2022-08-29 RX ADMIN — LIDOCAINE 1 PATCH: 4 CREAM TOPICAL at 12:10

## 2022-08-29 RX ADMIN — Medication 600 MILLIGRAM(S): at 12:10

## 2022-08-29 RX ADMIN — Medication 650 MILLIGRAM(S): at 12:10

## 2022-08-29 NOTE — ED PROVIDER NOTE - PROGRESS NOTE DETAILS
Winnie Cole MD, PGY-3: pt with improved pain. unable to give valium given pt is driving. will give script for meds.

## 2022-08-29 NOTE — ED PROVIDER NOTE - PATIENT PORTAL LINK FT
You can access the FollowMyHealth Patient Portal offered by [facility] by registering at the following website: [email or web address]. By joining [facility]’s FollowMyHealth portal, you will also be able to view your health information using other applications (apps) compatible with our system.

## 2022-08-29 NOTE — ED PROVIDER NOTE - PHYSICAL EXAMINATION
GENERAL: NAD  HEENT:  Atraumatic  CHEST/LUNG: Chest rise equal bilaterally  HEART: Regular rate and rhythm  ABDOMEN: Soft, Nontender, Nondistended  EXTREMITIES:  Extremities warm  PSYCH: A&Ox3  SKIN: No obvious rashes or lesions  MSK: No cervical spine TTP, able to range neck to the left and right/ No midline spinal TTP/ No swelling, redness, pain or discharge in the back.   NEUROLOGY: strength and sensation intact in all extremities.

## 2022-08-29 NOTE — ED ADULT NURSE NOTE - OBJECTIVE STATEMENT
Pt walked in c/o back pain for 2 x days pt denies any trauma to site or recent heavy lifting in acute distress pending md nadeem rivera rn

## 2022-08-29 NOTE — ED ADULT TRIAGE NOTE - CHIEF COMPLAINT QUOTE
Pt c/o lower back pain x 2 days, atraumatic. Denies history of back pain in the past. Denies urinary complaints. Fs 249

## 2022-08-29 NOTE — ED PROVIDER NOTE - NS ED ROS FT
GENERAL: No fever or chills  EYES: no change in vision   HEENT: no trouble swallowing or speaking   CARDIAC: no chest pain   PULMONARY: no cough or SOB  GI:  No abdominal pain  : No changes in urination   SKIN: no rashes   NEURO: no headache   MSK: No joint pain. +Back pain    All other ROS negative unless otherwise specified in HPI.

## 2022-08-29 NOTE — ED PROVIDER NOTE - OBJECTIVE STATEMENT
66 y/o male w/ PMH HTN, DM, HLD c/o 2 day history of intermittent 8/10 localized lower back pain that began when the patient woke up, worsened w/ movement. Denies fevers, chills, nausea, vomiting, dizziness, chest pain, SOB, abdominal pain, dysuria, urinary incontinence/retention, saddle anesthesia, numbness/tingling.

## 2022-08-29 NOTE — ED PROVIDER NOTE - NSFOLLOWUPINSTRUCTIONS_ED_ALL_ED_FT
Back Pain    Back pain is very common in adults. The cause of back pain is rarely dangerous and the pain often gets better over time. The cause of your back pain may not be known and may include strain of muscles or ligaments, degeneration of the spinal disks, or arthritis. Occasionally the pain may radiate down your leg(s). Over-the-counter medicines to reduce pain and inflammation are often the most helpful. Stretching and remaining active frequently helps the healing process.     SEEK IMMEDIATE MEDICAL CARE IF YOU HAVE ANY OF THE FOLLOWING SYMPTOMS: bowel or bladder control problems, unusual weakness or numbness in your arms or legs, nausea or vomiting, abdominal pain, fever, dizziness/lightheadedness. Back Pain    Back pain is very common in adults. The cause of back pain is rarely dangerous and the pain often gets better over time. The cause of your back pain may not be known and may include strain of muscles or ligaments, degeneration of the spinal disks, or arthritis. Occasionally the pain may radiate down your leg(s). Over-the-counter medicines to reduce pain and inflammation are often the most helpful. Stretching and remaining active frequently helps the healing process.     SEEK IMMEDIATE MEDICAL CARE IF YOU HAVE ANY OF THE FOLLOWING SYMPTOMS: bowel or bladder control problems, unusual weakness or numbness in your arms or legs, nausea or vomiting, abdominal pain, fever, dizziness/lightheadedness.    --take tylenol or motrin as needed for pain. Take tylenol 1000mg every 6 hours alternating with motrin 600 mg every 6 hours (take tylenol or motrin then three hours later take the other then three hours later take the first). also take prescribed lidocaine patch or valium as needed for back pain.

## 2022-08-29 NOTE — ED PROVIDER NOTE - BIRTH SEX
Male Hemigard Postcare Instructions: The HEMIGARD strips are to remain completely dry for at least 5-7 days.

## 2022-08-29 NOTE — ED PROVIDER NOTE - CLINICAL SUMMARY MEDICAL DECISION MAKING FREE TEXT BOX
68 y/o male w/ PMH HTN, DM, HLD c/o 2 day history of intermittent 8/10 localized lower back pain that began when the patient woke up, worsened w/ movement. Patient's pain is likely due to MSK-related muscle strain. There is lower concern for cauda equina syndrome (able to ambulate, no saddle anesthesia, urinary retention/incontinence), spinal epidural abscess (no fevers, chills, signs of inflammation). Well treat patient's pain, provide spine center referral, and reassess patient w/ likely d/c w/ close PCP f/u.

## 2022-09-14 ENCOUNTER — APPOINTMENT (OUTPATIENT)
Dept: OPHTHALMOLOGY | Facility: CLINIC | Age: 67
End: 2022-09-14

## 2022-11-23 ENCOUNTER — APPOINTMENT (OUTPATIENT)
Dept: OPHTHALMOLOGY | Facility: CLINIC | Age: 67
End: 2022-11-23

## 2022-11-23 ENCOUNTER — NON-APPOINTMENT (OUTPATIENT)
Age: 67
End: 2022-11-23

## 2022-11-23 PROCEDURE — 92134 CPTRZ OPH DX IMG PST SGM RTA: CPT

## 2022-11-23 PROCEDURE — 92014 COMPRE OPH EXAM EST PT 1/>: CPT

## 2023-05-24 ENCOUNTER — NON-APPOINTMENT (OUTPATIENT)
Age: 68
End: 2023-05-24

## 2023-07-26 ENCOUNTER — APPOINTMENT (OUTPATIENT)
Dept: OPHTHALMOLOGY | Facility: CLINIC | Age: 68
End: 2023-07-26
Payer: COMMERCIAL

## 2023-07-26 PROCEDURE — 92014 COMPRE OPH EXAM EST PT 1/>: CPT

## 2023-07-26 PROCEDURE — 92134 CPTRZ OPH DX IMG PST SGM RTA: CPT

## 2023-07-31 NOTE — DIETITIAN INITIAL EVALUATION ADULT. - PROBLEM SELECTOR PROBLEM 4
"Mary Kitchenhuong Guerra was seen and treated in our emergency department on 7/31/2023.  She may return to work on 08/03/2023.       If you have any questions or concerns, please don't hesitate to call.      Larry Gooden MD"
"Mary Kitchenhuong Guerra was seen and treated in our emergency department on 7/31/2023.  She may return to work on 08/03/2023.       If you have any questions or concerns, please don't hesitate to call.      Larry Gooden MD"
COPD (chronic obstructive pulmonary disease)

## 2023-08-16 ENCOUNTER — APPOINTMENT (OUTPATIENT)
Dept: OPHTHALMOLOGY | Facility: CLINIC | Age: 68
End: 2023-08-16
Payer: COMMERCIAL

## 2023-08-16 ENCOUNTER — NON-APPOINTMENT (OUTPATIENT)
Age: 68
End: 2023-08-16

## 2023-08-16 PROCEDURE — 66821 AFTER CATARACT LASER SURGERY: CPT | Mod: LT

## 2023-08-21 ENCOUNTER — EMERGENCY (EMERGENCY)
Facility: HOSPITAL | Age: 68
LOS: 1 days | Discharge: HOME | End: 2023-08-21
Admitting: EMERGENCY MEDICINE
Payer: COMMERCIAL

## 2023-08-21 VITALS
TEMPERATURE: 98 F | RESPIRATION RATE: 18 BRPM | DIASTOLIC BLOOD PRESSURE: 70 MMHG | SYSTOLIC BLOOD PRESSURE: 175 MMHG | OXYGEN SATURATION: 98 % | HEART RATE: 88 BPM

## 2023-08-21 VITALS
TEMPERATURE: 97 F | OXYGEN SATURATION: 100 % | HEART RATE: 80 BPM | SYSTOLIC BLOOD PRESSURE: 138 MMHG | RESPIRATION RATE: 18 BRPM | DIASTOLIC BLOOD PRESSURE: 85 MMHG

## 2023-08-21 DIAGNOSIS — Z98.89 OTHER SPECIFIED POSTPROCEDURAL STATES: Chronic | ICD-10-CM

## 2023-08-21 PROCEDURE — 99284 EMERGENCY DEPT VISIT MOD MDM: CPT

## 2023-08-21 PROCEDURE — 73552 X-RAY EXAM OF FEMUR 2/>: CPT | Mod: 26,LT

## 2023-08-21 PROCEDURE — 73502 X-RAY EXAM HIP UNI 2-3 VIEWS: CPT | Mod: 26,LT

## 2023-08-21 RX ORDER — LIDOCAINE 4 G/100G
1 CREAM TOPICAL ONCE
Refills: 0 | Status: COMPLETED | OUTPATIENT
Start: 2023-08-21 | End: 2023-08-21

## 2023-08-21 RX ORDER — ACETAMINOPHEN 500 MG
650 TABLET ORAL ONCE
Refills: 0 | Status: COMPLETED | OUTPATIENT
Start: 2023-08-21 | End: 2023-08-21

## 2023-08-21 RX ADMIN — LIDOCAINE 1 PATCH: 4 CREAM TOPICAL at 10:38

## 2023-08-21 NOTE — ED PROVIDER NOTE - OBJECTIVE STATEMENT
67 y/o M with pmhx of HTN, HLD, DM type 2 (insulin dependent) presents to the ED c/o L hip and L thigh pain. Pt noted pain in lateral L thigh a week ago and states pain gradually radiated up into the hip. Pt reports constant, sharp, 10/10 pain that is worse with movement and walking. Pt did not take anything for the pain at home and states he has been walking with a limp 2/2 pain. Denies fever/chills, swelling in LLE, extremity numbness/tingling/weakness, back pain, neck pain, recent trauma/fall, recent travel, hemoptysis, intermittent claudication, calf tenderness, hx of DVT.

## 2023-08-21 NOTE — ED PROVIDER NOTE - PHYSICAL EXAMINATION
General: Well appearing in no acute distress, alert and cooperative  Head: Normocephalic, atraumatic  Neck: Soft and supple, full ROM without pain, no midline tenderness  Cardiac: Regular rate and regular rhythm, no murmurs, peripheral pulses 2+ and symmetric in all extremities, no LE edema.  Resp: Unlabored respiratory effort, lungs CTAB  Abd: Soft, non-tender, non-distended, no guarding or rebound tenderness. Well healed midline surgical incision and R sided oblique surgical incision noted. No masses palpated.   MSK: +mild L hip/thigh tenderness overlying area of greater trochanter of L femur. Neurovascularly intact with 2+ DP pulses, sensation grossly intact. Motor and strength 5/5 in LLE. Limited hip flexion 2/2 pain. Full passive ROM of L hip and L knee. No overlying rashes or swelling noted. Spine midline and non-tender.  Skin: Warm and dry, no rashes/abrasions/lacerations  Neuro: AO x 3, moves all extremities symmetrically, Motor strength 5/5 bilaterally UE and LE, sensation grossly intact. General: Well appearing in no acute distress, alert and cooperative  Head: Normocephalic, atraumatic  Neck: Soft and supple, full ROM without pain, no midline tenderness  Cardiac: Regular rate and regular rhythm, no murmurs, peripheral pulses 2+ and symmetric in all extremities, no LE edema. No calf tenderness or palpable cords. Cap refill <2 seconds in b/l LE.  Resp: Unlabored respiratory effort, lungs CTAB  Abd: Soft, non-tender, non-distended, no guarding or rebound tenderness. Well healed midline surgical incision and R sided oblique surgical incision noted. No masses palpated.   MSK: +mild L hip/thigh tenderness overlying area of greater trochanter of L femur. Neurovascularly intact with 2+ DP pulses, sensation grossly intact. Motor and strength 5/5 in LLE. Limited hip flexion 2/2 pain. Full passive ROM of L hip and L knee. No overlying rashes or swelling noted. Spine midline and non-tender.  Skin: Warm and dry, no rashes/abrasions/lacerations  Neuro: AO x 3, moves all extremities symmetrically, Motor strength 5/5 bilaterally UE and LE, sensation grossly intact. Normal gait.

## 2023-08-21 NOTE — ED ADULT NURSE NOTE - OBJECTIVE STATEMENT
Patient received in stretcher. AOX4. Respirations even and unlabored. Spontaneous movement of all extremities noted. Presents to ER c/o LLE pain. Patient states pain shoots up and down leg all day. Reports only pain on ambulation and movement. Denies recent falls or trauma to hip. No deformity noted + pulse + sensation + ROM. Medicated as per MD orders.  Comfort and safety maintained. All current care needs met. Care plan continued Rusty CHRISTOPHER

## 2023-08-21 NOTE — ED PROVIDER NOTE - NSICDXPASTMEDICALHX_GEN_ALL_CORE_FT
PAST MEDICAL HISTORY:  Cholelithiasis     COPD (chronic obstructive pulmonary disease)     Diabetes fs this am 89    Heart murmur recent dx    HLD (hyperlipidemia)     Hypertension

## 2023-08-21 NOTE — ED PROVIDER NOTE - NSDCPRINTRESULTS_ED_ALL_ED
1 person assist Patient requests all Lab, Cardiology, and Radiology Results on their Discharge Instructions

## 2023-08-21 NOTE — ED PROVIDER NOTE - PROGRESS NOTE DETAILS
KIRBY Coleman: XR findings: There is no acute fracture or dislocation. There is mild osteoarthritis of the left hip. The left knee joint space is maintained. There is severe vascular atherosclerosis.   Pt with palpable DP and PT pulses on b/l LE with no skin changes, no focal neuro deficits. No calf tenderness or palpable cords on exam. Per hx, pt without sx of intermittent claudication. Pain likely not vascular in etiology.  Reassessment performed. Pt refusing tylenol for pain as "doctor told me not to take tylenol and motrin" and would like to speak to his PCP prior to taking tylenol. Pt states that pain has improved with lidocaine patch at this time. Shared decision making - Discussed with pt to follow-up with ortho outpatient for MRI to further evaluate pain and to follow-up with PCP for incidental finding of severe atherosclerosis.   Patient is medically stable for discharge. Strict return precautions given. Patient to follow up with PMD, ortho. Patient displays understanding and agreeable with plan, comfortable with discharge plan home.

## 2023-08-21 NOTE — ED PROVIDER NOTE - CLINICAL SUMMARY MEDICAL DECISION MAKING FREE TEXT BOX
67 y/o M with pmhx of HTN, HLD, DM type 2 (insulin dependent) presents to the ED c/o L hip and L thigh pain. Pt noted pain in lateral L thigh a week ago and states pain gradually radiated up into the hip. Pt reports constant, sharp, 10/10 pain that is worse with movement and walking. Pt did not take anything for the pain at home and states he has been walking with a limp 2/2 pain. Denies fever/chills, swelling in LLE, extremity numbness/tingling/weakness, back pain, neck pain, recent trauma/fall, recent travel, hemoptysis, intermittent claudication, calf tenderness, hx of DVT.      Patient currently afebrile, hemodynamically stable, spO2 100%. Physical exam with mild tenderness to palpation over L hip and lateral thigh.   Based on history and physical, differentials include but are not limited to OA vs osteosarcoma vs ligamentous/msk pain. Plan to assess patient for acute pathology as listed above with XR hip and femur. Will administer medications for symptomatic relief, follow-up on results, and reassess. If workup negative, likely d/c home with outpt ortho f/u and pain control. Cane to support ambulation. 69 y/o M with pmhx of HTN, HLD, DM type 2 (insulin dependent) presents to the ED c/o L hip and L thigh pain. Pt noted pain in lateral L thigh a week ago and states pain gradually radiated up into the hip. Pt reports constant, sharp, 10/10 pain that is worse with movement and walking. Pt did not take anything for the pain at home and states he has been walking with a limp 2/2 pain. Denies fever/chills, swelling in LLE, extremity numbness/tingling/weakness, back pain, neck pain, recent trauma/fall, recent travel, hemoptysis, intermittent claudication, calf tenderness, hx of DVT.    Patient currently afebrile, hemodynamically stable, spO2 100%. Physical exam with mild tenderness to palpation over L hip and lateral thigh.   Based on history and physical, differentials include but are not limited to OA vs osteosarcoma vs ligamentous/msk pain. Low suspicion for DVT given lack of unilateral swelling or intermittent claudication. Pain is not exertional. No recent travel or immobilization. Plan to assess patient for acute pathology as listed above with XR hip and femur. Will administer medications for symptomatic relief, follow-up on results, and reassess. If workup negative, likely d/c home with outpt ortho f/u and pain control. Cane to support ambulation.

## 2023-08-21 NOTE — ED PROVIDER NOTE - CARE PLAN
1 Principal Discharge DX:	Osteoarthritis of left hip  Secondary Diagnosis:	Atherosclerosis of left lower extremity

## 2023-08-21 NOTE — ED PROVIDER NOTE - NSFOLLOWUPINSTRUCTIONS_ED_ALL_ED_FT
You were seen in the ED for hip and thigh pain.  A copy of the results from today's visit is provided.    Advance activity as tolerated.  Continue all previously prescribed medications as directed unless otherwise instructed. Take Tylenol 650mg (Two 325 mg pills) every 4-6 hours as needed for pain or fevers. Follow up with your primary care physician and orthopedist - bring copies of your results.  Return to the ER for worsening or persistent symptoms, and/or ANY NEW OR CONCERNING SYMPTOMS including severe pain, inability to ambulate/walk, swelling, fever/chills, chest pain, shortness of breath, severe back pain. If you have issues obtaining follow up, please call: [phone] to obtain a doctor or specialist who takes your insurance in your area. You were seen in the ED for hip and thigh pain.  A copy of the results from today's visit is provided.    Advance activity as tolerated.  Continue all previously prescribed medications as directed unless otherwise instructed. Take Tylenol 650mg (Two 325 mg pills) every 4-6 hours as needed for pain or fevers. Follow up with your primary care physician and orthopedist - bring copies of your results.  Return to the ER for worsening or persistent symptoms, and/or ANY NEW OR CONCERNING SYMPTOMS including severe pain, inability to ambulate/walk, swelling, fever/chills, chest pain, shortness of breath, severe back pain. If you have issues obtaining follow up, please call: [phone] to obtain a doctor or specialist who takes your insurance in your area.      ARTHRITIS   WHAT YOU NEED TO KNOW:  Arthritis is pain or disease in one or more joints. There are many types of arthritis. Types such as rheumatoid arthritis cause inflammation in the joints. Other types wear away the cartilage between joints, such as osteoarthritis. This makes the bones of the joint rub together when you move the joint. An infection from bacteria, a virus, or a fungus can also cause arthritis. Your symptoms may be constant, or symptoms may come and go. Arthritis often gets worse over time and can cause permanent joint damage.  DISCHARGE INSTRUCTIONS:  Call your doctor or rheumatologist if:   •You have a fever and severe joint pain or swelling.  •You cannot move the affected joint.  •You have severe joint pain you cannot tolerate.  •You have a new or worsening rash.  •Your pain or swelling does not get better with treatment.  •You have questions or concerns about your condition or care.  Medicines:   •Acetaminophen decreases pain and fever. It is available without a doctor's order. Ask how much to take and how often to take it. Follow directions. Read the labels of all other medicines you are using to see if they also contain acetaminophen, or ask your doctor or pharmacist. Acetaminophen can cause liver damage if not taken correctly. Do not use more than 4 grams (4,000 milligrams) total of acetaminophen in one day.   •NSAIDs, such as ibuprofen, help decrease swelling, pain, and fever. This medicine is available with or without a doctor's order. NSAIDs can cause stomach bleeding or kidney problems in certain people. If you take blood thinner medicine, always ask your healthcare provider if NSAIDs are safe for you. Always read the medicine label and follow directions  •Steroids reduce swelling and pain.  •Prescription pain medicine may be given. Ask your healthcare provider how to take this medicine safely. Some prescription pain medicines contain acetaminophen. Do not take other medicines that contain acetaminophen without talking to your healthcare provider. Too much acetaminophen may cause liver damage. Prescription pain medicine may cause constipation. Ask your healthcare provider how to prevent or treat constipation.   •Take your medicine as directed. Contact your healthcare provider if you think your medicine is not helping or if you have side effects. Tell him of her if you are allergic to any medicine. Keep a list of the medicines, vitamins, and herbs you take. Include the amounts, and when and why you take them. Bring the list or the pill bottles to follow-up visits. Carry your medicine list with you in case of an emergency.  MANAGE YOUR SYMPTOMS:   •Rest your painful joint so it can heal. Your healthcare provider may recommend crutches or a walker if the affected joint is in a leg.  •Apply ice or heat to the joint. Both can help decrease swelling and pain. Ice may also help prevent tissue damage. Use an ice pack, or put crushed ice in a plastic bag. Cover it with a towel and place it on your joint for 15 to 20 minutes every hour or as directed. You can apply heat for 20 minutes every 2 hours. Heat treatment includes hot packs or heat lamps.  •Elevate your joint. Elevation helps reduce swelling and pain. Raise your joint above the level of your heart as often as you can. Prop your painful joint on pillows to keep it above your heart comfortably.  Elevate Leg  MANAGE ARTHRITIS  •Talk to your healthcare providers about your arthritis medicines. Some medicines may only be needed when you have arthritis pain. You may need to take other medicines every day to prevent arthritis from getting worse. Your healthcare providers will help you understand all your medicines and when to take them. It is important to take the medicines as directed, even if you start to feel better. You can continue to have joint damage and inflammation even if you do not feel it.  •Eat a variety of healthy foods. Healthy foods include fruits, vegetables, whole-grain breads, low-fat dairy products, beans, lean meats, and fish. Ask if you need to be on a special diet. A diet rich in calcium and vitamin D may decrease your risk of osteoporosis. Foods high in calcium include milk, cheese, broccoli, and tofu. Vitamin D may be found in meat, fish, fortified milk, cereal and bread. Ask if you need calcium or vitamin D supplements.   •Go to physical or occupational therapy as directed. A physical therapist can teach you exercises to improve flexibility and range of motion. You may also be shown non-weight-bearing exercises that are safe for your joints, such as swimming. Exercise can help keep your joints flexible and reduce pain. An occupational therapist can help you learn to do your daily activities when your joints are stiff or sore.  •Maintain a healthy weight. Extra weight puts increased pressure on your joints. Ask your healthcare provider what you should weigh. If you need to lose weight, he or she can help you create a weight loss program. Weight loss can help reduce pain and increase your ability to do your activities.  •Wear flat or low-heeled shoes. This will help decrease pain and reduce pressure on your ankle, knee, and hip joints.  •Do not smoke. Nicotine and other chemicals in cigarettes and cigars can damage your bones and joints. Ask your healthcare provider for information if you currently smoke and need help to quit. E-cigarettes or smokeless tobacco still contain nicotine. Talk to your healthcare provider before you use these products.   Support devices:   •Orthotic shoes or insoles help support your feet when you walk.  •Crutches, a cane, or a walker may help decrease your risk for falling. They also decrease stress on affected joints.  •Devices to prevent falls include raised toilet seats and bathtub bars to help you get up from sitting. Handrails can be placed in areas where you need balance and support.  •Devices to help with support and rest include splints to wear on your hands and a firm pillow while you sleep. Use a pillow that is firm enough to support your neck and head.  Follow up with your healthcare provider or rheumatologist as directed: Write down your questions so you remember to ask them during your visits.

## 2023-08-25 ENCOUNTER — APPOINTMENT (OUTPATIENT)
Age: 68
End: 2023-08-25
Payer: COMMERCIAL

## 2023-08-25 VITALS
SYSTOLIC BLOOD PRESSURE: 188 MMHG | WEIGHT: 254 LBS | HEIGHT: 73 IN | BODY MASS INDEX: 33.66 KG/M2 | DIASTOLIC BLOOD PRESSURE: 102 MMHG | HEART RATE: 102 BPM

## 2023-08-25 DIAGNOSIS — I74.3 EMBOLISM AND THROMBOSIS OF ARTERIES OF THE LOWER EXTREMITIES: ICD-10-CM

## 2023-08-25 PROBLEM — E78.5 HYPERLIPIDEMIA, UNSPECIFIED: Chronic | Status: ACTIVE | Noted: 2023-08-21

## 2023-08-25 PROCEDURE — 99204 OFFICE O/P NEW MOD 45 MIN: CPT

## 2023-08-25 PROCEDURE — 93880 EXTRACRANIAL BILAT STUDY: CPT

## 2023-08-25 PROCEDURE — 93924 LWR XTR VASC STDY BILAT: CPT

## 2023-08-25 RX ORDER — CILOSTAZOL 50 MG/1
50 TABLET ORAL
Qty: 180 | Refills: 3 | Status: ACTIVE | COMMUNITY
Start: 2023-08-25 | End: 1900-01-01

## 2023-08-25 NOTE — ASSESSMENT
[FreeTextEntry1] : Patient complaining of left hip pain.  Arterial Dopplers show moderate to severe arterial insufficiency of bilateral lower extremity right worse than left.  Considering the symptoms are mostly in the left hip area I do not believe the symptoms are ischemic in nature.  Recommend orthopedic evaluation.  No significant carotid disease on duplex.  Recommend aspirin and Pletal 50 mg twice a day.  This was all discussed with the patient detail.  Follow-up in 3 to 6 months with repeat PVRs.

## 2023-08-25 NOTE — HISTORY OF PRESENT ILLNESS
[FreeTextEntry1] : Patient is a 68-year-old gentleman with past medical history significant for diabetes, hypertension who is a former smoker who quit about 30 years ago presenting to us for evaluation of lower extremity circulation.  The past 2 weeks patient has had significant left hip pain and presented to the emergency room underwent a work-up which included an x-ray without any significant fractures.  On the x-ray patient was noted to have significant calcification of his SFA.  Patient reports mild claudication symptoms which have improved over the past few years.  No significant rest pain.  The pain is mainly in the left hip and left lateral thigh area and is not related to exercise.  No history of tissue loss in lower extremities.  No significant coronary artery disease.  No history of stroke.

## 2023-08-25 NOTE — CONSULT LETTER
[Dear  ___] : Dear  [unfilled], [Consult Letter:] : I had the pleasure of evaluating your patient, [unfilled]. [Please see my note below.] : Please see my note below. [Consult Closing:] : Thank you very much for allowing me to participate in the care of this patient.  If you have any questions, please do not hesitate to contact me. [Sincerely,] : Sincerely, [FreeTextEntry3] : Austin De La O M.D., F.TRANG., R.P.JOSUÉI.  of Vascular Surgery Assistant Professor of Radiology Director of Endovascular Program/ [facility]

## 2023-08-25 NOTE — PHYSICAL EXAM
[JVD] : no jugular venous distention  [Normal Breath Sounds] : Normal breath sounds [Normal Heart Sounds] : normal heart sounds [2+] : left 2+ [Varicose Veins Of Lower Extremities] : not present [Ankle Swelling (On Exam)] : not present [] : not present [Abdomen Masses] : No abdominal masses

## 2023-09-06 ENCOUNTER — NON-APPOINTMENT (OUTPATIENT)
Age: 68
End: 2023-09-06

## 2023-09-06 ENCOUNTER — APPOINTMENT (OUTPATIENT)
Dept: OPHTHALMOLOGY | Facility: CLINIC | Age: 68
End: 2023-09-06
Payer: COMMERCIAL

## 2023-09-06 PROCEDURE — 99024 POSTOP FOLLOW-UP VISIT: CPT

## 2023-10-12 NOTE — PROGRESS NOTE ADULT - ASSESSMENT
recent stress echo normal   recent echo 4/2018 EF 65%, mod AI, mod lvh, costello dysfxn  CT chest (6/25/18) trace b/l pleural effusions, emphysema     A/P    63 year old man with history of Hypertension, DM, COPD, AI admitted with increased dyspnea and cough for the past 2 weeks.      1. Dyspnea, cough  likely related and secondary to volume overload secondary to acute on chronic diastolic HF in setting of uncontrolled HTN  RVP negative   CT revealing trace b/l pleural effusions, emphysema   change lasix to PO   pending echo   bp control    2. hypertension  bp slightly improved, dbp still remains elevated   if continues to remain elevated, can increase clonidine to TID     3. Abnl EKG  likely sec to lvh  recent stress nl  trend ce's    4. AI  mod and stable on recent echo  recheck echo       dvt ppx recent stress echo normal   recent echo 4/2018 EF 65%, mod AI, mod lvh, costello dysfxn  CT chest (6/25/18) trace b/l pleural effusions, emphysema     A/P    63 year old man with history of Hypertension, DM, COPD, AI admitted with increased dyspnea and cough for the past 2 weeks.      1. Dyspnea, cough  likely related and secondary to volume overload secondary to acute on chronic diastolic HF in setting of uncontrolled HTN  RVP negative   CT revealing trace b/l pleural effusions, emphysema   change lasix to PO   pending echo   bp control    2. hypertension  bp slightly improved, dbp still remains elevated   increase lisinopril to 40mg daily     3. Abnl EKG  likely sec to lvh  recent stress nl  trend ce's    4. AI  mod and stable on recent echo  recheck echo       dvt ppx  d/c home recent stress echo normal   recent echo 4/2018 EF 65%, mod AI, mod lvh, costello dysfxn  CT chest (6/25/18) trace b/l pleural effusions, emphysema     A/P    63 year old man with history of Hypertension, DM, COPD, AI admitted with increased dyspnea and cough for the past 2 weeks.      1. Dyspnea, cough  likely related and secondary to volume overload secondary to acute on chronic diastolic HF in setting of uncontrolled HTN  RVP negative   CT revealing trace b/l pleural effusions, emphysema   change lasix to PO   pending echo   bp control    2. hypertension  bp slightly improved, dbp still remains elevated   increase lisinopril to 40mg daily     3. Abnl EKG  likely sec to lvh  recent stress nl  trend ce's    4. AI  mod and stable on recent echo  recheck echo       dvt ppx  d/c home, f/u appointment scheduled for 7/11 @ 4:15pm Performed

## 2023-12-01 ENCOUNTER — APPOINTMENT (OUTPATIENT)
Dept: VASCULAR SURGERY | Facility: CLINIC | Age: 68
End: 2023-12-01

## 2024-03-06 ENCOUNTER — APPOINTMENT (OUTPATIENT)
Dept: OPHTHALMOLOGY | Facility: CLINIC | Age: 69
End: 2024-03-06

## 2024-07-31 ENCOUNTER — APPOINTMENT (OUTPATIENT)
Dept: VASCULAR SURGERY | Facility: CLINIC | Age: 69
End: 2024-07-31
Payer: COMMERCIAL

## 2024-07-31 PROCEDURE — G2211 COMPLEX E/M VISIT ADD ON: CPT | Mod: NC

## 2024-07-31 PROCEDURE — 93923 UPR/LXTR ART STDY 3+ LVLS: CPT

## 2024-07-31 PROCEDURE — 99214 OFFICE O/P EST MOD 30 MIN: CPT | Mod: 25

## 2024-08-05 NOTE — CONSULT LETTER
[Dear  ___] : Dear  [unfilled], [Consult Letter:] : I had the pleasure of evaluating your patient, [unfilled]. [Please see my note below.] : Please see my note below. [Consult Closing:] : Thank you very much for allowing me to participate in the care of this patient.  If you have any questions, please do not hesitate to contact me. [Sincerely,] : Sincerely, [FreeTextEntry3] : Austin De La O M.D., F.TRANG., R.P.JOSUÉI.  of Vascular Surgery Assistant Professor of Radiology Director of Endovascular Program/ [facility] Center [facility]

## 2024-08-05 NOTE — ASSESSMENT
[FreeTextEntry1] : Patient complaining of left hip pain.  Arterial Dopplers show moderate to severe arterial insufficiency of bilateral lower extremity right worse than left.  Considering the symptoms are mostly in the left hip area I do not believe the symptoms are ischemic in nature.  Recommend orthopedic evaluation.  No significant carotid disease on duplex.  Recommend aspirin and Pletal 50 mg twice a day for treatment of peripheral vascular disease.  Continue atorvastatin for treatment of hyperlipidemia.  This was all discussed with the patient detail.  Follow-up in 6 months with repeat PVRs.

## 2024-08-05 NOTE — PLAN
[FreeTextEntry1] : Patient will follow up if needed. Warning signs, follow up, and restrictions were discussed with the patient
no

## 2024-09-08 NOTE — PHYSICAL THERAPY INITIAL EVALUATION ADULT - PATIENT PROFILE REVIEW, REHAB EVAL
HISTORY & PHYSICAL    Date: 2024     Chief Complaint: Shortness of breath and cough    Subjective:   HPI:   Patient came to the hospital for a chief complaint of shortness of breath and cough secondary to COPD exacerbation.  Apparently the patient does have history of COPD and chronic smoker.  Patient denies having any chest pain abdominal pain nausea vomiting or diarrhea.        Patient Active Problem List   Diagnosis    COPD with acute exacerbation  (CMD)        PMHx:  She  has a past medical history of Essential (primary) hypertension and TIA (transient ischemic attack).    She has no past medical history of Difficult intubation, Failed moderate sedation during procedure, Malignant hyperthermia, Motion sickness, PONV (postoperative nausea and vomiting), or Spinal headache.     PSHx:  Her  has a past surgical history that includes  section, classic.     SHx:  She  reports that she has been smoking cigarettes. She has never used smokeless tobacco. She reports current alcohol use of about 4.0 standard drinks of alcohol per week. She reports current drug use. Frequency: 2.00 times per week. Drug: Marijuana.      FHx:  Her family history includes Cancer, Stomach in her sister; Heart disease in her father; Stroke/TIA in her mother.    Allergies: ALLERGIES:  Penicillins     Home MEDS:  Prior to Admission medications    Medication Sig Start Date End Date Taking? Authorizing Provider   potassium CHLORIDE 10 MEQ ER tablet Take 10 mEq by mouth in the morning and 10 mEq in the evening. Taken Last month.    Provider, Outside   losartan (COZAAR) 25 MG tablet Take 25 mg by mouth daily. Last taken yesterday    Provider, Outside   folic acid (FOLATE) 1 MG tablet Take 1 mg by mouth daily. Last taken yesterday    Provider, Outside   predniSONE (DELTASONE) 5 MG tablet Take 5 mg by mouth daily. Last taken yesterday    Provider, Outside   atorvastatin (LIPITOR) 20 MG tablet Take 20 mg by mouth daily. Last taken yesterday    yes  Provider, Outside   omeprazole (PriLOSEC) 40 MG capsule Take 40 mg by mouth daily. Last taken yesterday.    Provider, Outside   amLODIPine (NORVASC) 10 MG tablet Take 10 mg by mouth daily.    Provider, Outside   cyanocobalamin (Vitamin B-12) 500 MCG tablet Take 500 mcg by mouth daily.    Provider, Outside   prochlorperazine (COMPAZINE) 5 MG tablet Take 5 mg by mouth every 8 hours as needed for Nausea.    Provider, Outside            Review of Systems:  Constitutional: Negative for fever, chills, change in appetite or fatigue.  Skin: Negative for rash or wounds.  HEENT: Negative for eye drainage, rhinorrhea, ear pain, sore throat or neck pain.  Respiratory: Positive for cough and  shortness of breath.  Cardiovascular: Negative for chest pain, chest pressure, palpitations or diaphoresis.  Gastrointestinal: Negative for nausea, vomiting, diarrhea, abdominal pain, black or tarry stools.  Genitourinary: Negative for dysuria, urgency, frequency, hematuria or flank pain.  Extremities:  Negative for joint swelling or joint pain.  Neurologic:  Negative for change in sensory or motor function.  Negative for headache, change in gait, vertigo, vision or speech.  Endocrine: Negative for heat or cold intolerance, weight loss or gain.  Hematological: Negative for bleeding, bruising or lymphadenopathy.  Psychiatric: Negative for change in affect, anxiety, depression, mentation or sleep disturbance.      Objective:   VITALS: Visit Vitals  /79   Pulse 80   Temp 98.2 °F (36.8 °C) (Oral)   Resp 16   Ht 5' 3\" (1.6 m)   SpO2 100%   BMI 18.35 kg/m²     EXAM:     General:  Alert, cooperative.  Skin:  Warm and dry without rash.    Head:  Normocephalic-atraumatic.   Neck:  Trachea is midline. No adenopathy.  Normal thyroid without mass or tenderness..  Eyes:  Normal conjunctivae and sclerae.  Pupils equal, round, reactive to light.  Extraocular movements intact.    Cardiovascular:  Symmetrical pulses.  Regular rate and rhythm without  murmur.  Respiratory:   Diminished air entry bilateral and wheezing  Gastrointestinal:  Soft and nontender.  Normal bowel sounds.  No hepatomegaly or splenomegaly.  No guarding or rebound tenderness.  No masses.  Musculoskeletal:  No deformity or edema.  No tenderness to palpation.    Back:   Normal alignment.  No costovertebral angle tenderness or midline bony tenderness.  Neurologic:   Oriented times 4.  Cranial nerves 2-12 are intact.  No nystagmus.  No focal deficits or lateralizing signs.  Normal gait without ataxia.        Data Review:    Labs:  Admission on 09/06/2024   Component Date Value Ref Range Status    Sodium 09/06/2024 142  135 - 145 mmol/L Final    Potassium 09/06/2024 3.3 (L)  3.4 - 5.1 mmol/L Final    Chloride 09/06/2024 112 (H)  97 - 110 mmol/L Final    Carbon Dioxide 09/06/2024 27  21 - 32 mmol/L Final    Anion Gap 09/06/2024 6 (L)  7 - 19 mmol/L Final    Glucose 09/06/2024 85  70 - 99 mg/dL Final    BUN 09/06/2024 7  6 - 20 mg/dL Final    Creatinine 09/06/2024 0.54  0.51 - 0.95 mg/dL Final    Glomerular Filtration Rate 09/06/2024 >90  >=60 Final    eGFR results = or >60 mL/min/1.73m2 = Normal kidney function. Estimated GFR calculated using the CKD-EPI-R (2021) equation that does not include race in the creatinine calculation.    BUN/Cr 09/06/2024 13  7 - 25 Final    Calcium 09/06/2024 9.1  8.4 - 10.2 mg/dL Final    Bilirubin, Total 09/06/2024 0.8  0.2 - 1.0 mg/dL Final    GOT/AST 09/06/2024 22  <=37 Units/L Final    GPT/ALT 09/06/2024 27  <64 Units/L Final    Alkaline Phosphatase 09/06/2024 66  45 - 117 Units/L Final    Albumin 09/06/2024 3.7  3.6 - 5.1 g/dL Final    Protein, Total 09/06/2024 6.7  6.4 - 8.2 g/dL Final    Globulin 09/06/2024 3.0  2.0 - 4.0 g/dL Final    A/G Ratio 09/06/2024 1.2  1.0 - 2.4 Final    Rapid SARS-COV-2 by PCR 09/06/2024 Not Detected  Not Detected / Detected / Presumptive Positive / Inhibitors present Final    Influenza A by PCR 09/06/2024 Not Detected  Not Detected  Final    Influenza B by PCR 09/06/2024 Not Detected  Not Detected Final    RSV BY PCR 09/06/2024 Not Detected  Not Detected Final    Isolation Guidelines 09/06/2024    Final    Do not use this test result as the sole decision-maker for discontinuation of isolation.   Clinical evaluation should be considered for other respiratory illness requiring transmission-based isolation.    -    No fever (<99.0 F/37.2 C) for at least 24 hours without the use of fever-reducing medications    AND  -    Respiratory symptoms have improved or resolved (e.g. cough, shortness of breath)     AND  -    COVID-19 negative test    See COVID-19 Deisolation Resource Guide    Procedural Comment 09/06/2024    Final    SARS-COV-2 nucleic acid has not been detected indicating the absence of COVID-19.    This test was performed using the [employer] Xpert Xpress SARS-CoV-2/Flu/RSV RT-PCR test that has been given Emergency Use Authorization (EUA) by the [place] Food and Drug Administration (FDA). These tests are considered definitive and do not need to be confirmed by another method.    WBC 09/06/2024 7.5  4.2 - 11.0 K/mcL Final    RBC 09/06/2024 3.48 (L)  4.00 - 5.20 mil/mcL Final    HGB 09/06/2024 12.7  12.0 - 15.5 g/dL Final    HCT 09/06/2024 36.8  36.0 - 46.5 % Final    MCV 09/06/2024 105.7 (H)  78.0 - 100.0 fl Final    MCH 09/06/2024 36.5 (H)  26.0 - 34.0 pg Final    MCHC 09/06/2024 34.5  32.0 - 36.5 g/dL Final    RDW-CV 09/06/2024 12.9  11.0 - 15.0 % Final    RDW-SD 09/06/2024 50.3 (H)  39.0 - 50.0 fL Final    PLT 09/06/2024 211  140 - 450 K/mcL Final    NRBC 09/06/2024 0  <=0 /100 WBC Final    Neutrophil, Percent 09/06/2024 53  % Final    Lymphocytes, Percent 09/06/2024 40  % Final    Mono, Percent 09/06/2024 7  % Final    Eosinophils, Percent 09/06/2024 0  % Final    Basophils, Percent 09/06/2024 0  % Final    Immature Granulocytes 09/06/2024 0  % Final    Absolute Neutrophils 09/06/2024 3.9  1.8 - 7.7 K/mcL Final    Absolute Lymphocytes  09/06/2024 3.0  1.0 - 4.0 K/mcL Final    Absolute Monocytes 09/06/2024 0.5  0.3 - 0.9 K/mcL Final    Absolute Eosinophils  09/06/2024 0.0  0.0 - 0.5 K/mcL Final    Absolute Basophils 09/06/2024 0.0  0.0 - 0.3 K/mcL Final    Absolute Immature Granulocytes 09/06/2024 0.0  0.0 - 0.2 K/mcL Final    Ventricular Rate EKG/Min (BPM) 09/06/2024 70   Final    Atrial Rate (BPM) 09/06/2024 70   Final    [place]-Interval (MSEC) 09/06/2024 130   Final    QRS-Interval (MSEC) 09/06/2024 78   Final    QT-Interval (MSEC) 09/06/2024 420   Final    QTc 09/06/2024 453   Final    P Axis (Degrees) 09/06/2024 58   Final    R Axis (Degrees) 09/06/2024 -34   Final    T Axis (Degrees) 09/06/2024 36   Final    REPORT TEXT 09/06/2024    Final                    Value:Normal sinus rhythm  Left axis deviation  Nonspecific ST abnormality  Abnormal ECG  When compared with ECG of  08-NOV-2022 17:27,  T wave amplitude has decreased in  Inferior leads  Confirmed by ANALIA REYNA, SARAH ([id number]) on 9/7/2024 10:10:40 AM      Adenovirus 09/07/2024 Not Detected  Not Detected Final    Bordetella Parapertussis 09/07/2024 Not Detected  Not Detected Final    Bordetella pertussis 09/07/2024 Not Detected  Not Detected Final    Chlamydophila pneumoniae 09/07/2024 Not Detected  Not Detected Final    Coronavirus, 229E 09/07/2024 Not Detected  Not Detected Final    Coronavirus, HKU1 09/07/2024 Not Detected  Not Detected Final    Coronavirus, NL63 09/07/2024 Not Detected  Not Detected Final    Coronavirus, OC43 09/07/2024 Not Detected  Not Detected Final    Influenza A 09/07/2024 Not Detected  Not Detected Final    Influenza B 09/07/2024 Not Detected  Not Detected Final    Metapneumovirus 09/07/2024 Not Detected  Not Detected Final    Mycoplasma pneumoniae 09/07/2024 Not Detected  Not Detected Final    Parainfluenza 1 09/07/2024 Not Detected  Not Detected Final    Parainfluenza 2 09/07/2024 Not Detected  Not Detected Final    Parainfluenza 3 09/07/2024 Not Detected  Not  Detected Final    Parainfluenza 4 09/07/2024 Not Detected  Not Detected Final    Respiratory Syncytial virus 09/07/2024 Not Detected  Not Detected Final    Rhinovirus/Enterovirus 09/07/2024 Not Detected  Not Detected Final    SARS-CoV-2 by PCR 09/07/2024 Not Detected  Not Detected / Detected / Inhibitors Present Final        Imaging:   No results found.         Assessment:   COPD with acute exacerbation  Tobacco dependent      PLAN:   Bronchodilator protocol  Pulmonary evaluation  Chest x-ray  IV antibiotics        By:  Mickey Byrnes MD, 9/7/2024, 10:59 PM    Primary Care Physician:  Kary Munoz MD

## 2024-09-17 ENCOUNTER — INPATIENT (INPATIENT)
Facility: HOSPITAL | Age: 69
LOS: 6 days | Discharge: HOME HEALTH | End: 2024-09-24
Attending: INTERNAL MEDICINE | Admitting: INTERNAL MEDICINE
Payer: COMMERCIAL

## 2024-09-17 VITALS
WEIGHT: 253.09 LBS | OXYGEN SATURATION: 97 % | TEMPERATURE: 98 F | HEIGHT: 74 IN | HEART RATE: 94 BPM | SYSTOLIC BLOOD PRESSURE: 211 MMHG | DIASTOLIC BLOOD PRESSURE: 112 MMHG | RESPIRATION RATE: 18 BRPM

## 2024-09-17 DIAGNOSIS — Z98.89 OTHER SPECIFIED POSTPROCEDURAL STATES: Chronic | ICD-10-CM

## 2024-09-17 DIAGNOSIS — E11.9 TYPE 2 DIABETES MELLITUS WITHOUT COMPLICATIONS: ICD-10-CM

## 2024-09-17 DIAGNOSIS — J44.9 CHRONIC OBSTRUCTIVE PULMONARY DISEASE, UNSPECIFIED: ICD-10-CM

## 2024-09-17 DIAGNOSIS — R06.02 SHORTNESS OF BREATH: ICD-10-CM

## 2024-09-17 DIAGNOSIS — N18.30 CHRONIC KIDNEY DISEASE, STAGE 3 UNSPECIFIED: ICD-10-CM

## 2024-09-17 DIAGNOSIS — Z79.899 OTHER LONG TERM (CURRENT) DRUG THERAPY: ICD-10-CM

## 2024-09-17 DIAGNOSIS — R06.00 DYSPNEA, UNSPECIFIED: ICD-10-CM

## 2024-09-17 DIAGNOSIS — I16.0 HYPERTENSIVE URGENCY: ICD-10-CM

## 2024-09-17 LAB
ALBUMIN SERPL ELPH-MCNC: 3.9 G/DL — SIGNIFICANT CHANGE UP (ref 3.3–5)
ALP SERPL-CCNC: 94 U/L — SIGNIFICANT CHANGE UP (ref 40–120)
ALT FLD-CCNC: 13 U/L — SIGNIFICANT CHANGE UP (ref 4–41)
ANION GAP SERPL CALC-SCNC: 13 MMOL/L — SIGNIFICANT CHANGE UP (ref 7–14)
APTT BLD: 31.5 SEC — SIGNIFICANT CHANGE UP (ref 24.5–35.6)
AST SERPL-CCNC: 17 U/L — SIGNIFICANT CHANGE UP (ref 4–40)
BASOPHILS # BLD AUTO: 0.04 K/UL — SIGNIFICANT CHANGE UP (ref 0–0.2)
BASOPHILS NFR BLD AUTO: 0.4 % — SIGNIFICANT CHANGE UP (ref 0–2)
BILIRUB SERPL-MCNC: 0.5 MG/DL — SIGNIFICANT CHANGE UP (ref 0.2–1.2)
BLD GP AB SCN SERPL QL: NEGATIVE — SIGNIFICANT CHANGE UP
BUN SERPL-MCNC: 19 MG/DL — SIGNIFICANT CHANGE UP (ref 7–23)
CALCIUM SERPL-MCNC: 9.5 MG/DL — SIGNIFICANT CHANGE UP (ref 8.4–10.5)
CHLORIDE SERPL-SCNC: 110 MMOL/L — HIGH (ref 98–107)
CO2 SERPL-SCNC: 23 MMOL/L — SIGNIFICANT CHANGE UP (ref 22–31)
CREAT SERPL-MCNC: 1.64 MG/DL — HIGH (ref 0.5–1.3)
EGFR: 45 ML/MIN/1.73M2 — LOW
EOSINOPHIL # BLD AUTO: 0.25 K/UL — SIGNIFICANT CHANGE UP (ref 0–0.5)
EOSINOPHIL NFR BLD AUTO: 2.5 % — SIGNIFICANT CHANGE UP (ref 0–6)
FLUAV AG NPH QL: SIGNIFICANT CHANGE UP
FLUBV AG NPH QL: SIGNIFICANT CHANGE UP
GLUCOSE SERPL-MCNC: 69 MG/DL — LOW (ref 70–99)
HCT VFR BLD CALC: 43.4 % — SIGNIFICANT CHANGE UP (ref 39–50)
HGB BLD-MCNC: 14.3 G/DL — SIGNIFICANT CHANGE UP (ref 13–17)
IANC: 7.71 K/UL — HIGH (ref 1.8–7.4)
IMM GRANULOCYTES NFR BLD AUTO: 1.1 % — HIGH (ref 0–0.9)
INR BLD: 1.01 RATIO — SIGNIFICANT CHANGE UP (ref 0.85–1.18)
LYMPHOCYTES # BLD AUTO: 1.27 K/UL — SIGNIFICANT CHANGE UP (ref 1–3.3)
LYMPHOCYTES # BLD AUTO: 12.5 % — LOW (ref 13–44)
MCHC RBC-ENTMCNC: 28.6 PG — SIGNIFICANT CHANGE UP (ref 27–34)
MCHC RBC-ENTMCNC: 32.9 GM/DL — SIGNIFICANT CHANGE UP (ref 32–36)
MCV RBC AUTO: 86.8 FL — SIGNIFICANT CHANGE UP (ref 80–100)
MONOCYTES # BLD AUTO: 0.77 K/UL — SIGNIFICANT CHANGE UP (ref 0–0.9)
MONOCYTES NFR BLD AUTO: 7.6 % — SIGNIFICANT CHANGE UP (ref 2–14)
NEUTROPHILS # BLD AUTO: 7.71 K/UL — HIGH (ref 1.8–7.4)
NEUTROPHILS NFR BLD AUTO: 75.9 % — SIGNIFICANT CHANGE UP (ref 43–77)
NRBC # BLD: 0 /100 WBCS — SIGNIFICANT CHANGE UP (ref 0–0)
NRBC # FLD: 0 K/UL — SIGNIFICANT CHANGE UP (ref 0–0)
NT-PROBNP SERPL-SCNC: 1333 PG/ML — HIGH
PLATELET # BLD AUTO: 191 K/UL — SIGNIFICANT CHANGE UP (ref 150–400)
POTASSIUM SERPL-MCNC: 3.8 MMOL/L — SIGNIFICANT CHANGE UP (ref 3.5–5.3)
POTASSIUM SERPL-SCNC: 3.8 MMOL/L — SIGNIFICANT CHANGE UP (ref 3.5–5.3)
PROT SERPL-MCNC: 7.2 G/DL — SIGNIFICANT CHANGE UP (ref 6–8.3)
PROTHROM AB SERPL-ACNC: 11.3 SEC — SIGNIFICANT CHANGE UP (ref 9.5–13)
RBC # BLD: 5 M/UL — SIGNIFICANT CHANGE UP (ref 4.2–5.8)
RBC # FLD: 14.3 % — SIGNIFICANT CHANGE UP (ref 10.3–14.5)
RH IG SCN BLD-IMP: POSITIVE — SIGNIFICANT CHANGE UP
RSV RNA NPH QL NAA+NON-PROBE: SIGNIFICANT CHANGE UP
SARS-COV-2 RNA SPEC QL NAA+PROBE: SIGNIFICANT CHANGE UP
SODIUM SERPL-SCNC: 146 MMOL/L — HIGH (ref 135–145)
TROPONIN T, HIGH SENSITIVITY RESULT: 28 NG/L — SIGNIFICANT CHANGE UP
TROPONIN T, HIGH SENSITIVITY RESULT: 28 NG/L — SIGNIFICANT CHANGE UP
WBC # BLD: 10.15 K/UL — SIGNIFICANT CHANGE UP (ref 3.8–10.5)
WBC # FLD AUTO: 10.15 K/UL — SIGNIFICANT CHANGE UP (ref 3.8–10.5)

## 2024-09-17 PROCEDURE — 99285 EMERGENCY DEPT VISIT HI MDM: CPT

## 2024-09-17 PROCEDURE — 71045 X-RAY EXAM CHEST 1 VIEW: CPT | Mod: 26

## 2024-09-17 PROCEDURE — 74174 CTA ABD&PLVS W/CONTRAST: CPT | Mod: 26,MC

## 2024-09-17 PROCEDURE — 93458 L HRT ARTERY/VENTRICLE ANGIO: CPT | Mod: 26

## 2024-09-17 PROCEDURE — 71275 CT ANGIOGRAPHY CHEST: CPT | Mod: 26,MC

## 2024-09-17 RX ORDER — HYDRALAZINE HYDROCHLORIDE 100 MG/1
25 TABLET ORAL ONCE
Refills: 0 | Status: COMPLETED | OUTPATIENT
Start: 2024-09-17 | End: 2024-09-17

## 2024-09-17 RX ORDER — AMLODIPINE BESYLATE 5 MG
10 TABLET ORAL DAILY
Refills: 0 | Status: DISCONTINUED | OUTPATIENT
Start: 2024-09-17 | End: 2024-09-24

## 2024-09-17 RX ORDER — LABETALOL HYDROCHLORIDE 200 MG/1
300 TABLET, FILM COATED ORAL
Refills: 0 | Status: DISCONTINUED | OUTPATIENT
Start: 2024-09-17 | End: 2024-09-24

## 2024-09-17 RX ORDER — ALCOHOL ANTISEPTIC PADS
25 PADS, MEDICATED (EA) TOPICAL ONCE
Refills: 0 | Status: DISCONTINUED | OUTPATIENT
Start: 2024-09-17 | End: 2024-09-24

## 2024-09-17 RX ORDER — DOXAZOSIN 2 MG/1
2 TABLET ORAL AT BEDTIME
Refills: 0 | Status: DISCONTINUED | OUTPATIENT
Start: 2024-09-17 | End: 2024-09-24

## 2024-09-17 RX ORDER — INSULIN GLARGINE 300 U/ML
10 INJECTION, SOLUTION SUBCUTANEOUS AT BEDTIME
Refills: 0 | Status: DISCONTINUED | OUTPATIENT
Start: 2024-09-17 | End: 2024-09-17

## 2024-09-17 RX ORDER — SODIUM CHLORIDE IRRIG SOLUTION 0.9 %
1000 SOLUTION, IRRIGATION IRRIGATION
Refills: 0 | Status: DISCONTINUED | OUTPATIENT
Start: 2024-09-17 | End: 2024-09-24

## 2024-09-17 RX ORDER — GLUCAGON INJECTION, SOLUTION 0.5 MG/.1ML
1 INJECTION, SOLUTION SUBCUTANEOUS ONCE
Refills: 0 | Status: DISCONTINUED | OUTPATIENT
Start: 2024-09-17 | End: 2024-09-24

## 2024-09-17 RX ORDER — INSULIN LISPRO 100/ML
VIAL (ML) SUBCUTANEOUS
Refills: 0 | Status: DISCONTINUED | OUTPATIENT
Start: 2024-09-17 | End: 2024-09-24

## 2024-09-17 RX ORDER — AMLODIPINE BESYLATE 5 MG
10 TABLET ORAL ONCE
Refills: 0 | Status: COMPLETED | OUTPATIENT
Start: 2024-09-17 | End: 2024-09-17

## 2024-09-17 RX ORDER — ALCOHOL ANTISEPTIC PADS
12.5 PADS, MEDICATED (EA) TOPICAL ONCE
Refills: 0 | Status: DISCONTINUED | OUTPATIENT
Start: 2024-09-17 | End: 2024-09-24

## 2024-09-17 RX ORDER — TIOTROPIUM BROMIDE INHALATION SPRAY 3.12 UG/1
2 SPRAY, METERED RESPIRATORY (INHALATION) DAILY
Refills: 0 | Status: DISCONTINUED | OUTPATIENT
Start: 2024-09-17 | End: 2024-09-24

## 2024-09-17 RX ORDER — ASPIRIN 325 MG
324 TABLET ORAL ONCE
Refills: 0 | Status: COMPLETED | OUTPATIENT
Start: 2024-09-17 | End: 2024-09-17

## 2024-09-17 RX ORDER — LABETALOL HYDROCHLORIDE 200 MG/1
10 TABLET, FILM COATED ORAL ONCE
Refills: 0 | Status: COMPLETED | OUTPATIENT
Start: 2024-09-17 | End: 2024-09-17

## 2024-09-17 RX ORDER — HYDRALAZINE HYDROCHLORIDE 100 MG/1
25 TABLET ORAL
Refills: 0 | Status: DISCONTINUED | OUTPATIENT
Start: 2024-09-17 | End: 2024-09-24

## 2024-09-17 RX ORDER — INSULIN LISPRO 100/ML
VIAL (ML) SUBCUTANEOUS AT BEDTIME
Refills: 0 | Status: DISCONTINUED | OUTPATIENT
Start: 2024-09-17 | End: 2024-09-24

## 2024-09-17 RX ORDER — HYDRALAZINE HYDROCHLORIDE 100 MG/1
5 TABLET ORAL ONCE
Refills: 0 | Status: COMPLETED | OUTPATIENT
Start: 2024-09-17 | End: 2024-09-17

## 2024-09-17 RX ORDER — CLONIDINE HYDROCHLORIDE 0.2 MG/1
0.2 TABLET ORAL
Refills: 0 | Status: DISCONTINUED | OUTPATIENT
Start: 2024-09-17 | End: 2024-09-24

## 2024-09-17 RX ORDER — LABETALOL HYDROCHLORIDE 200 MG/1
300 TABLET, FILM COATED ORAL ONCE
Refills: 0 | Status: COMPLETED | OUTPATIENT
Start: 2024-09-17 | End: 2024-09-17

## 2024-09-17 RX ORDER — ASPIRIN 325 MG
81 TABLET ORAL DAILY
Refills: 0 | Status: DISCONTINUED | OUTPATIENT
Start: 2024-09-17 | End: 2024-09-23

## 2024-09-17 RX ORDER — ALBUTEROL 90 MCG
2 AEROSOL (GRAM) INHALATION EVERY 6 HOURS
Refills: 0 | Status: DISCONTINUED | OUTPATIENT
Start: 2024-09-17 | End: 2024-09-24

## 2024-09-17 RX ORDER — ALCOHOL ANTISEPTIC PADS
15 PADS, MEDICATED (EA) TOPICAL ONCE
Refills: 0 | Status: DISCONTINUED | OUTPATIENT
Start: 2024-09-17 | End: 2024-09-24

## 2024-09-17 RX ADMIN — LABETALOL HYDROCHLORIDE 300 MILLIGRAM(S): 200 TABLET, FILM COATED ORAL at 09:04

## 2024-09-17 RX ADMIN — Medication 2 PUFF(S): at 22:07

## 2024-09-17 RX ADMIN — Medication 81 MILLIGRAM(S): at 21:34

## 2024-09-17 RX ADMIN — HYDRALAZINE HYDROCHLORIDE 25 MILLIGRAM(S): 100 TABLET ORAL at 10:58

## 2024-09-17 RX ADMIN — Medication 2: at 16:34

## 2024-09-17 RX ADMIN — Medication 324 MILLIGRAM(S): at 09:30

## 2024-09-17 RX ADMIN — CLONIDINE HYDROCHLORIDE 0.2 MILLIGRAM(S): 0.2 TABLET ORAL at 17:33

## 2024-09-17 RX ADMIN — HYDRALAZINE HYDROCHLORIDE 25 MILLIGRAM(S): 100 TABLET ORAL at 17:34

## 2024-09-17 RX ADMIN — HYDRALAZINE HYDROCHLORIDE 5 MILLIGRAM(S): 100 TABLET ORAL at 14:20

## 2024-09-17 RX ADMIN — LABETALOL HYDROCHLORIDE 300 MILLIGRAM(S): 200 TABLET, FILM COATED ORAL at 17:34

## 2024-09-17 RX ADMIN — DOXAZOSIN 2 MILLIGRAM(S): 2 TABLET ORAL at 21:34

## 2024-09-17 RX ADMIN — Medication 10 MILLIGRAM(S): at 21:34

## 2024-09-17 RX ADMIN — TIOTROPIUM BROMIDE INHALATION SPRAY 2 PUFF(S): 3.12 SPRAY, METERED RESPIRATORY (INHALATION) at 22:04

## 2024-09-17 RX ADMIN — Medication 10 MILLIGRAM(S): at 10:58

## 2024-09-17 RX ADMIN — LABETALOL HYDROCHLORIDE 10 MILLIGRAM(S): 200 TABLET, FILM COATED ORAL at 09:04

## 2024-09-17 NOTE — PATIENT PROFILE ADULT - FALL HARM RISK - RISK INTERVENTIONS

## 2024-09-17 NOTE — CONSULT NOTE ADULT - TIME BILLING
agree with above  64 year old male with hx  Hypertension, Diabetes Mellitus, Aortic Regurgitation, Diastolic Heart Failure, CBD Stone, s/p Stent, PAD, Coronavirus 3/2020, Chronic Kidney Disease presenting with sob, uncontrolled HTN     #uncontrolled HTN   -pt reports he missed recent meds d/t SOB   -sys bp 200s on admission   -resume outpt meds -labetalol 300mg TID, doxasozin 2mg daily in PM, clon 0.2mg BID, norvasc 10mg daily, and hydral 25mg BID  -remains off lisinopril pending labs due to recent NANCY  -monitor edema, if increases will dec amlodopine dosing to5 qd  -awaiting cta chest/ abd to r/o dissection    #acute on chronic Diastolic CHF   -likely in the setting of uncontrolled HTN   -plan for echo   -plan for cath   -hs trop x1 neg   -repeat 2nd trop   -ecg with no ischemic changes, lVH with repol noted   -start lasix 40 mg IVP daily   -check Chest xray   -resume ASA 81 mg daily     # Aortic Regurgitation  -prior echo revealed stable AI, mild-moderate  -check ECHO     # PAD  -known moderate distal right SFA disease  -resume ASA and continue to monitor clinically      dvt ppx

## 2024-09-17 NOTE — ED ADULT TRIAGE NOTE - CHIEF COMPLAINT QUOTE
pt c/o sob since yesterday. hx. HTN. DM2. HLD. pt denies chest pain, n/v. pt hypertensive in triage however asymptomatic. EKG in progress.

## 2024-09-17 NOTE — ED ADULT NURSE NOTE - OBJECTIVE STATEMENT
Pt received to room 24 , A&Ox4, ambulatory, accompanied by family member coming to the ED for c/o sob since yesterday. Endorsing dyspnea on exertion. Pt denies chest pain, fevers, chills, N/V/D, abdominal pain, dizziness, blurry vision, headache, urinary symptoms. RR equal and mildly labored on room air, placed on 2L NC as per MD. Pt placed on cardiac monitor, Abdomen soft, non-tender, non-distended. Neuro intact. 20G IV placed to the L AC, labs drawn and sent. Medicated as ordered. Care plan continued. Comfort measures provided. Safety maintained. Awaiting lab results and imaging. repeat EKG completed.

## 2024-09-17 NOTE — ED PROVIDER NOTE - CLINICAL SUMMARY MEDICAL DECISION MAKING FREE TEXT BOX
69-year-old male with history of HTN, HLD, Aortic Regurgitation , Diastolic Heart Failure, CBD Stone s/p Stent, PAD, CKD, p/w SOB. No AC use. Pt developed SOB yesterday initially intermittent now constant overnight, came to ED for evaluation. Has not taken BP meds in 2d d/t symptoms (on amlodipine and labetalol at home). Denies fever/chills, HA, CP, back pain, abd pain, n/v/d/c, sweating, urinary syx, numbness/tingling, focal weakness. Denies Hx of seeing cardiologist, cardiac Hx obtained from chart review, seen by [facility] (sonja) in 2020.     VS +/112  General: WN/WD in mild distress  Head: Atraumatic  Eyes: EOM grossly in tact, no scleral icterus  ENT: dry mucous membranes  Neurology: A&Ox3, nonfocal, PIKE x 4  Respiratory: normal respiratory effort, ctab b/l no w/r/r, satting 92% on RA  CV: Extremities warm and well perfused, RRR +diastolic murmur  Abdominal: Soft, non-distended, non-tender  Extremities: No edema  Skin: No rashes    DDx incl. ACS/MI vs dissection, pt appears unwell, initial EKG abnormal (prolonged QT and mild V1/V2 elevations); will repeat EKG now, get CTA chest to r/o dissection, labs, cardiac markers, cxr, dispo TBA pending workup. - Kervin Wright MD. EM Attending

## 2024-09-17 NOTE — PRE PROCEDURE NOTE - PRE PROCEDURE EVALUATION
Pre-sedation evaluation    Dentures: none  Last PO intake: 9/16/24 20:00    Level of consciousness: 1  Obstructive sleep apnea: No  Aspiration risk: No  Mallampati score: IV  ASA Classification: 2  Prior Sedative or Anesthesia Experience: No complications  Informed consent by responsible adult: Yes  Responsible adult escort: NA  Based on today's assessment, anesthesia consult requested: Yes

## 2024-09-17 NOTE — ED PROVIDER NOTE - PROGRESS NOTE DETAILS
Cardiology consulted as pt w/interval EKG changes, concerning for possible anterior MI. - Kervin Wright MD. EM Attending House Cardiology consulted as pt w/interval EKG changes, concerning for possible anterior MI, pt clinical picture unwell appearing. - Kervin Wright MD. EM Attending On chart review pt has been seen by [facility] team in the past, Dr. ELIZABETH South consulted for EKG changes; repeat ekg (3rd) unchanged from 2nd EKG. Pending CTAs and lab results; pt becoming hypoxic now 91-90% on RA, placed on 2L NC. - Kervin Wright MD. EM Attending Per chart review, pt with covid pneumonia, emphysema and intersitial lung disease on prior CTs. CTA negative for dissection. pancreatic lesion. per cardiology, plan to admit for cath today. pt amenable with plan. discussed outpt GI f/u for pancreatic lesion.

## 2024-09-17 NOTE — H&P ADULT - ASSESSMENT
69-year-old male with history of HTN, HLD, Aortic Regurgitation , Diastolic Heart Failure, CBD Stone s/p Stent, PAD, CKD, p/w SOB.   Pt developed SOB yesterday initially intermittent now constant overnight, came to ED for evaluation. No other associated symptom.

## 2024-09-17 NOTE — ED ADULT NURSE REASSESSMENT NOTE - NS ED NURSE REASSESS COMMENT FT1
Report given to Cristine CHRISTOPHER in cath lab, patient to go for cath procedure today. awaiting transport.

## 2024-09-17 NOTE — H&P ADULT - TIME BILLING
Admission H&P including bedside history , examination , reviewing test results and treatement plan . Cardiology consulted noted . D/W patient and acp

## 2024-09-17 NOTE — CONSULT NOTE ADULT - SUBJECTIVE AND OBJECTIVE BOX
CARDIOLOGY CONSULT - Dr. South         HPI:      PAST MEDICAL & SURGICAL HISTORY:  Hypertension      Diabetes  fs this am 120      COPD (chronic obstructive pulmonary disease)      Cholelithiasis      Heart murmur  recent dx      HLD (hyperlipidemia)      S/P exploratory laparotomy  23 years ago              PREVIOUS DIAGNOSTIC TESTING:    [ ] Echocardiogram:  [ ]  Catheterization:  [ ] Stress Test:  	    MEDICATIONS:  Home Medications:  aspirin 81 mg oral tablet: 1 tab(s) orally once a day (02 Apr 2020 00:45)  clonidine 0.1 mg oral tablet: 2 tab(s) orally 2 times a day (02 Apr 2020 00:45)  doxazosin 2 mg oral tablet: 1 tab(s) orally once a day (02 Apr 2020 00:45)  labetalol 100 mg oral tablet: 3 tab(s) orally 2 times a day (02 Apr 2020 00:45)  Spiriva 18 mcg inhalation capsule: 1 each inhaled once a day (02 Apr 2020 00:45)  Symbicort 160 mcg-4.5 mcg/inh inhalation aerosol: 2 puff(s) inhaled 2 times a day (02 Apr 2020 00:45)      MEDICATIONS  (STANDING):      FAMILY HISTORY:      SOCIAL HISTORY:    [ ] Non-smoker  [ ] Smoker  [ ] Alcohol    Allergies    No Known Allergies    Intolerances    	    REVIEW OF SYSTEMS:  CONSTITUTIONAL: No fever, weight loss, or fatigue  EYES: No eye pain, visual disturbances, or discharge  ENMT:  No difficulty hearing, tinnitus, vertigo; No sinus or throat pain  NECK: No pain or stiffness  RESPIRATORY: No cough, wheezing, chills or hemoptysis; No Shortness of Breath  CARDIOVASCULAR: No chest pain, palpitations, passing out, dizziness, or leg swelling  GASTROINTESTINAL: No abdominal or epigastric pain. No nausea, vomiting, or hematemesis; No diarrhea or constipation. No melena or hematochezia.  GENITOURINARY: No dysuria, frequency, hematuria, or incontinence  NEUROLOGICAL: No headaches, memory loss, loss of strength, numbness, or tremors  SKIN: No itching, burning, rashes, or lesions   	    [ ] All others negative	  [ ] Unable to obtain    PHYSICAL EXAM:  T(C): 36.9 (09-17-24 @ 07:45), Max: 36.9 (09-17-24 @ 07:45)  HR: 94 (09-17-24 @ 07:45) (94 - 94)  BP: 211/112 (09-17-24 @ 07:45) (211/112 - 211/112)  RR: 18 (09-17-24 @ 07:45) (18 - 18)  SpO2: 97% (09-17-24 @ 07:45) (97% - 97%)  Wt(kg): --  I&O's Summary      Appearance: Normal	  Psychiatry: A & O x 3, Mood & affect appropriate  HEENT:   Normal oral mucosa, PERRL, EOMI	  Lymphatic: No lymphadenopathy  Cardiovascular: Normal S1 S2,RRR, No JVD, No murmurs  Respiratory: Lungs clear to auscultation	  Gastrointestinal:  Soft, Non-tender, + BS	  Skin: No rashes, No ecchymoses, No cyanosis	  Neurologic: Non-focal  Extremities: Normal range of motion, No clubbing, cyanosis or edema  Vascular: Peripheral pulses palpable 2+ bilaterally    TELEMETRY: 	    ECG:  	  RADIOLOGY:  OTHER: 	  	  LABS:	 	    CARDIAC MARKERS:                                  14.3   10.15 )-----------( 191      ( 17 Sep 2024 07:07 )             43.4             proBNP:   Lipid Profile:   HgA1c:   TSH:        CARDIOLOGY CONSULT - Dr. South         HPI:  69-year-old male with history of HTN, HLD, Aortic Regurgitation , Diastolic Heart Failure, CBD Stone s/p Stent, PAD, CKD, p/w SOB.   Pt developed SOB yesterday initially intermittent now constant overnight, came to ED for evaluation. Has not taken BP meds  d/t symptoms . pt denies any fever, chills, cough or sick contacts. patient is known to our practice. no cp . bp elevated on exam ; out pt echoECHO 23 : nl LV sys fx ;  60-65%. There is mild concentric LV hypertrophy. Normal left atrial pressure with Grade I diastolic dysfunction; mild to moderate aortic regurgitation., mild AR  ROS otherwise negative       PAST MEDICAL HISTORY:  Hypertension, Diabetes Mellitus, Aortic Regurgitation, Diastolic Heart Failure, CBD Stone, s/p Stent, PAD, Coronavirus 3/2020, Chronic Kidney Disease    PAST SURGICAL HISTORY:  Denies    SOCIAL HISTORY:  Cigarettes: Quit ; Alcohol: None; Caffeine: Coffee 1 or 2 times/week    FAMILY HISTORY:  Noncontributory    ALLERGIES: No Known Allergies    HOME MEDICATIONS:   ZITHROMAX Z-BINU 250 MG........Sig: for 5 Days Qty: 6, take 2 tabs on day 1 then 1 tab daily on days 2-5  LASIX 40 MG...................Si tablet QD for 30 Days Qty: 30  TOUJEO SOLOSTAR 300 UNITS/ML..Si solution QD for 90 Days Qty: 90, 18 units once daily  LABETALOL 100 MG..............Sig: 3 tablet 2 times per day for 90 Days Qty: 540  HYDRALAZINE 25 MG.............Si tablet 2 times per day for 90 Days Qty: 180  ALBUTEROL 90 MCG/INH..........Si PUFFS every 6 hours for 30 Days Qty: 1  DOXAZOSIN 2 MG................Si tablet QD for 90 Days Qty: 90, at night  VITAMIN B-12 100 MCG..........Si tablet QD for 30 Days Qty: 30  VITAMIN D2 50,000 INTL UNITS..Si capsule Every Week for 4 Weeks Qty: 4  ASPIRIN 81 MG.................Si delayed release tablet QD for 90 Days Qty: 90  LANTUS SOLOSTAR  UNITS/ML..Si solution QD for 90 Days Qty: 90  SYMBICORT 160 MCG-4.5 MCG/INH..Si PUFFS BID for 30 Days Qty: 1  SPIRIVA 18 MCG................Si PUFFS QD for 90 Days Qty: 1  AMLODIPINE 10 MG..............Si tablet QD for 90 Days Qty: 90  CLONIDINE 0.1 MG..............Si tablet BID for 45 Days Qty: 180              PREVIOUS DIAGNOSTIC TESTING:    ECHO 23 : nl LV sys fx ;  60-65%. There is mild concentric LV hypertrophy. Normal left atrial pressure with Grade I diastolic dysfunction; mild to moderate aortic regurgitation., mild AR    	    REVIEW OF SYSTEMS:  CONSTITUTIONAL: No fever, weight loss, or fatigue  EYES: No eye pain, visual disturbances, or discharge  ENMT:  No difficulty hearing, tinnitus, vertigo; No sinus or throat pain  NECK: No pain or stiffness  RESPIRATORY: No cough, wheezing, chills or hemoptysis; ++ Shortness of Breath  CARDIOVASCULAR: No chest pain, palpitations, passing out, dizziness, or leg swelling  GASTROINTESTINAL: No abdominal or epigastric pain. No nausea, vomiting, or hematemesis; No diarrhea or constipation. No melena or hematochezia.  GENITOURINARY: No dysuria, frequency, hematuria, or incontinence  NEUROLOGICAL: No headaches, memory loss, loss of strength, numbness, or tremors  SKIN: No itching, burning, rashes, or lesions   	    [x ] All others negative	  [ ] Unable to obtain    PHYSICAL EXAM:  T(C): 36.9 (24 @ 07:45), Max: 36.9 (24 @ 07:45)  HR: 94 (24 @ 07:45) (94 - 94)  BP: 211/112 (24 @ 07:45) (211/112 - 211/112)  RR: 18 (24 @ 07:45) (18 - 18)  SpO2: 97% (24 @ 07:45) (97% - 97%)  Wt(kg): --  I&O's Summary      Appearance: Normal	  Psychiatry: A & O x 3, Mood & affect appropriate  HEENT:   Normal oral mucosa, PERRL, EOMI	  Lymphatic: No lymphadenopathy  Cardiovascular: Normal S1 S2,RR ++ murmur   Respiratory: Lungs clear to auscultation	  Gastrointestinal:  Soft, Non-tender, + BS	  Skin: No rashes, No ecchymoses, No cyanosis	  Neurologic: Non-focal  Extremities: +  edema  Vascular: Peripheral pulses palpable 2+ bilaterally    TELEMETRY: 	    ECG:  	  RADIOLOGY:  OTHER: 	  	  LABS:	 	    CARDIAC MARKERS:                                  14.3   10.15 )-----------( 191      ( 17 Sep 2024 07:07 )             43.4             proBNP:   Lipid Profile:   HgA1c:   TSH:

## 2024-09-17 NOTE — CHART NOTE - NSCHARTNOTEFT_GEN_A_CORE
Pt is s/p cath w/ right radial access.   Patient denies pain, numbness, tingling, CP, SOB.     Vital Signs Last 24 Hrs  T(C): 36.8 (17 Sep 2024 19:01), Max: 37.1 (17 Sep 2024 13:10)  T(F): 98.2 (17 Sep 2024 19:01), Max: 98.8 (17 Sep 2024 13:10)  HR: 87 (17 Sep 2024 21:32) (81 - 101)  BP: 132/85 (17 Sep 2024 21:32) (132/85 - 211/112)  BP(mean): 126 (17 Sep 2024 14:18) (126 - 126)  RR: 18 (17 Sep 2024 19:01) (15 - 24)  SpO2: 100% (17 Sep 2024 19:01) (93% - 100%)    Parameters below as of 17 Sep 2024 19:01  Patient On (Oxygen Delivery Method): room air      VSS.     Dressing is clear/dry/intact.   Site is without hematoma or bleeding.   Pulses palpable, cap refill <3 sec.   Strength, sensation intact in UE b/l     Will continue to monitor overnight.         Linnette Bustos, NP  Medicine Overnight Coverage  p [phone]

## 2024-09-17 NOTE — ED ADULT TRIAGE NOTE - NSWEIGHTCALCTOOLDRUG_GEN_A_CORE
Transitional Care Follow Up Visit  Subjective     Martha Randhawa is a 52 y.o. female who presents for a transitional care management visit.    Within 48 business hours after discharge our office contacted her via telephone to coordinate her care and needs.      I reviewed and discussed the details of that call along with the discharge summary, hospital problems, inpatient lab results, inpatient diagnostic studies, and consultation reports with Martha.     Current outpatient and discharge medications have been reconciled for the patient.  Reviewed by: HARSHA Orozco          2/17/2024     7:38 PM   Date of TCM Phone Call   [facility]   Date of Admission 2/16/2024   Date of Discharge 2/17/2024   Discharge Disposition Home or Self Care     Risk for Readmission (LACE) Score: 4 (2/17/2024  6:00 AM)      History of Present Illness  Pt is here to follow up on her recent admission at [facility] for bariatric surgery. Her BP elevated during her hospitalization. She is doing well today. She is following the prescribed diet and has had normal BM and urination. She was diagnosed with a mass on her right ovary and has been referred to GYN.     Course During Hospital Stay:  Pt had a laparoscopic gastric sleeve done. She did not had normal bleeding and did well post procedure. Her iron level was low so she was placed on iron. The surgeon discovered a right ovarian mass and referred her to GYN. Her blood pressure was elevated during her hospitalization but they did not change her home medications.      The following portions of the patient's history were reviewed and updated as appropriate: past family history and past medical history.    Review of Systems   Constitutional:  Negative for fever.   Respiratory:  Negative for shortness of breath.    Cardiovascular:  Negative for chest pain and leg swelling.   Gastrointestinal:  Negative for abdominal pain, constipation, diarrhea, nausea and  vomiting.       Objective   Physical Exam  Constitutional:       Appearance: Normal appearance.   HENT:      Head: Normocephalic.   Eyes:      Conjunctiva/sclera: Conjunctivae normal.      Pupils: Pupils are equal, round, and reactive to light.   Cardiovascular:      Rate and Rhythm: Normal rate and regular rhythm.      Heart sounds: Normal heart sounds.   Pulmonary:      Effort: Pulmonary effort is normal.      Breath sounds: Normal breath sounds.   Abdominal:      Tenderness: There is no abdominal tenderness.   Musculoskeletal:         General: Normal range of motion.   Skin:     General: Skin is warm and dry.      Capillary Refill: Capillary refill takes less than 2 seconds.   Neurological:      General: No focal deficit present.      Mental Status: She is alert and oriented to person, place, and time.   Psychiatric:         Mood and Affect: Mood normal.         Behavior: Behavior normal.         Thought Content: Thought content normal.         Judgment: Judgment normal.         Assessment & Plan   Diagnoses and all orders for this visit:    1. Morbid obesity with body mass index of 40.0-44.9 in adult (Primary)  Comments:  F/U with bariatrics as scheduled. Continue the precribed diet and advance as tolerated.    2. Primary hypertension  Comments:  Raise Losartan. We discussed diet and exercise. Monitor BP. Return in 1 month for follow up.  Orders:  -     losartan (Cozaar) 50 MG tablet; Take 1 tablet by mouth Daily.  Dispense: 90 tablet; Refill: 1    3. Ovarian mass, right  Comments:  Keep appt with GYN.                     used

## 2024-09-17 NOTE — ED PROVIDER NOTE - ATTENDING APP SHARED VISIT CONTRIBUTION OF CARE
I have evaluated the patient and agree with the documentation and assessment as made by the GREYSON. We have discussed plan of care and work up for the patient.   This was a shared visit with the GREYSON. I reviewed and verified the documentation and independently performed the documented:   History, Exam and Medical Decision Making.    69yoM w/Hx of HTN, HLD p/w SOB x1d. ***incomplete note***

## 2024-09-18 LAB
A1C WITH ESTIMATED AVERAGE GLUCOSE RESULT: 7.6 % — HIGH (ref 4–5.6)
ANION GAP SERPL CALC-SCNC: 12 MMOL/L — SIGNIFICANT CHANGE UP (ref 7–14)
BUN SERPL-MCNC: 20 MG/DL — SIGNIFICANT CHANGE UP (ref 7–23)
CALCIUM SERPL-MCNC: 8.8 MG/DL — SIGNIFICANT CHANGE UP (ref 8.4–10.5)
CHLORIDE SERPL-SCNC: 109 MMOL/L — HIGH (ref 98–107)
CO2 SERPL-SCNC: 24 MMOL/L — SIGNIFICANT CHANGE UP (ref 22–31)
CREAT SERPL-MCNC: 1.54 MG/DL — HIGH (ref 0.5–1.3)
EGFR: 49 ML/MIN/1.73M2 — LOW
ESTIMATED AVERAGE GLUCOSE: 171 — SIGNIFICANT CHANGE UP
GLUCOSE SERPL-MCNC: 120 MG/DL — HIGH (ref 70–99)
HCT VFR BLD CALC: 43.9 % — SIGNIFICANT CHANGE UP (ref 39–50)
HGB BLD-MCNC: 14.1 G/DL — SIGNIFICANT CHANGE UP (ref 13–17)
MCHC RBC-ENTMCNC: 28.1 PG — SIGNIFICANT CHANGE UP (ref 27–34)
MCHC RBC-ENTMCNC: 32.1 GM/DL — SIGNIFICANT CHANGE UP (ref 32–36)
MCV RBC AUTO: 87.5 FL — SIGNIFICANT CHANGE UP (ref 80–100)
NRBC # BLD: 0 /100 WBCS — SIGNIFICANT CHANGE UP (ref 0–0)
NRBC # FLD: 0 K/UL — SIGNIFICANT CHANGE UP (ref 0–0)
PLATELET # BLD AUTO: 166 K/UL — SIGNIFICANT CHANGE UP (ref 150–400)
POTASSIUM SERPL-MCNC: 3.7 MMOL/L — SIGNIFICANT CHANGE UP (ref 3.5–5.3)
POTASSIUM SERPL-SCNC: 3.7 MMOL/L — SIGNIFICANT CHANGE UP (ref 3.5–5.3)
RBC # BLD: 5.02 M/UL — SIGNIFICANT CHANGE UP (ref 4.2–5.8)
RBC # FLD: 14.6 % — HIGH (ref 10.3–14.5)
SODIUM SERPL-SCNC: 145 MMOL/L — SIGNIFICANT CHANGE UP (ref 135–145)
WBC # BLD: 7.53 K/UL — SIGNIFICANT CHANGE UP (ref 3.8–10.5)
WBC # FLD AUTO: 7.53 K/UL — SIGNIFICANT CHANGE UP (ref 3.8–10.5)

## 2024-09-18 RX ORDER — FUROSEMIDE 10 MG/ML
40 INJECTION INTRAVENOUS
Refills: 0 | Status: DISCONTINUED | OUTPATIENT
Start: 2024-09-18 | End: 2024-09-19

## 2024-09-18 RX ADMIN — HYDRALAZINE HYDROCHLORIDE 25 MILLIGRAM(S): 100 TABLET ORAL at 06:17

## 2024-09-18 RX ADMIN — Medication 5000 UNIT(S): at 19:42

## 2024-09-18 RX ADMIN — Medication 2 PUFF(S): at 06:17

## 2024-09-18 RX ADMIN — HYDRALAZINE HYDROCHLORIDE 25 MILLIGRAM(S): 100 TABLET ORAL at 19:40

## 2024-09-18 RX ADMIN — LABETALOL HYDROCHLORIDE 300 MILLIGRAM(S): 200 TABLET, FILM COATED ORAL at 06:17

## 2024-09-18 RX ADMIN — TIOTROPIUM BROMIDE INHALATION SPRAY 2 PUFF(S): 3.12 SPRAY, METERED RESPIRATORY (INHALATION) at 10:29

## 2024-09-18 RX ADMIN — CLONIDINE HYDROCHLORIDE 0.2 MILLIGRAM(S): 0.2 TABLET ORAL at 19:40

## 2024-09-18 RX ADMIN — DOXAZOSIN 2 MILLIGRAM(S): 2 TABLET ORAL at 22:03

## 2024-09-18 RX ADMIN — FUROSEMIDE 40 MILLIGRAM(S): 10 INJECTION INTRAVENOUS at 13:58

## 2024-09-18 RX ADMIN — Medication 10 MILLIGRAM(S): at 06:16

## 2024-09-18 RX ADMIN — Medication 81 MILLIGRAM(S): at 11:52

## 2024-09-18 RX ADMIN — Medication 2 PUFF(S): at 22:04

## 2024-09-18 RX ADMIN — LABETALOL HYDROCHLORIDE 300 MILLIGRAM(S): 200 TABLET, FILM COATED ORAL at 19:41

## 2024-09-18 RX ADMIN — CLONIDINE HYDROCHLORIDE 0.2 MILLIGRAM(S): 0.2 TABLET ORAL at 06:17

## 2024-09-18 RX ADMIN — Medication 4: at 11:51

## 2024-09-18 RX ADMIN — Medication 2 PUFF(S): at 10:29

## 2024-09-18 NOTE — CONSULT NOTE ADULT - SUBJECTIVE AND OBJECTIVE BOX
[facility] NEPHROLOGY- CONSULTATION NOTE    69y Male with history of below presents with SOB. Nephrology consulted for elevated Scr.    Patient with known history of CKD-3a with baseline Scr 1.5-1.7 as per PMD. Patient has been evaluated by an outpatient nephrologist and admits to h/o proteinuria and long standing HTN and DM with retinopathy. Patient on low dose ACE-I as an outpatient.    Patient s/p CT with IV contrast on 9/17 and LHC on 9/17 which will require intervention once patient more euvolemic.     REVIEW OF SYSTEMS:  Gen: no fevers  HEENT: no rhinorrhea  Neck: no sore throat  Cards: no chest pain  Resp: + dyspnea improving  GI: no nausea or vomiting or diarrhea  : no dysuria or hematuria  Vascular: no LE edema  Derm: no rashes  Neuro: no numbness/tingling    No Known Allergies      Home Medications Reviewed  Hospital Medications:   MEDICATIONS  (STANDING):  albuterol    90 MICROgram(s) HFA Inhaler 2 Puff(s) Inhalation every 6 hours  amLODIPine   Tablet 10 milliGRAM(s) Oral daily  aspirin enteric coated 81 milliGRAM(s) Oral daily  cloNIDine 0.2 milliGRAM(s) Oral two times a day  dextrose 5%. 1000 milliLiter(s) (100 mL/Hr) IV Continuous <Continuous>  dextrose 5%. 1000 milliLiter(s) (50 mL/Hr) IV Continuous <Continuous>  dextrose 50% Injectable 25 Gram(s) IV Push once  dextrose 50% Injectable 12.5 Gram(s) IV Push once  dextrose 50% Injectable 25 Gram(s) IV Push once  doxazosin 2 milliGRAM(s) Oral at bedtime  furosemide   Injectable 40 milliGRAM(s) IV Push two times a day  glucagon  Injectable 1 milliGRAM(s) IntraMuscular once  heparin   Injectable 5000 Unit(s) SubCutaneous every 12 hours  hydrALAZINE 25 milliGRAM(s) Oral two times a day  insulin lispro (ADMELOG) corrective regimen sliding scale   SubCutaneous at bedtime  insulin lispro (ADMELOG) corrective regimen sliding scale   SubCutaneous three times a day before meals  labetalol 300 milliGRAM(s) Oral two times a day  tiotropium 2.5 MICROgram(s) Inhaler 2 Puff(s) Inhalation daily      PAST MEDICAL & SURGICAL HISTORY:  Hypertension      Diabetes  fs this am 120      COPD (chronic obstructive pulmonary disease)      Cholelithiasis      Heart murmur  recent dx      HLD (hyperlipidemia)      S/P exploratory laparotomy  23 years ago          FAMILY HISTORY:      SOCIAL HISTORY:  Denies toxic substance use     VITALS:  T(F): 98 (09-18-24 @ 09:22), Max: 98.8 (09-17-24 @ 13:10)  HR: 78 (09-18-24 @ 09:22)  BP: 138/78 (09-18-24 @ 09:22)  RR: 19 (09-18-24 @ 09:22)  SpO2: 99% (09-18-24 @ 09:22)  Wt(kg): --    09-17 @ 07:01  -  09-18 @ 07:00  --------------------------------------------------------  IN: 240 mL / OUT: 950 mL / NET: -710 mL          PHYSICAL EXAM:  Gen: NAD, calm  HEENT: MMM  Neck: no JVD  Cards: RRR, +S1/S2, no M/G/R  Resp: CTA B/L  GI: soft, NT/ND, NABS  : no CVA tenderness  Vascular: no LE edema B/L  Derm: no rashes  Neuro: non-focal    LABS:  09-18    145  |  109[H]  |  20  ----------------------------<  120[H]  3.7   |  24  |  1.54[H]    Ca    8.8      18 Sep 2024 05:55    TPro  7.2  /  Alb  3.9  /  TBili  0.5  /  DBili      /  AST  17  /  ALT  13  /  AlkPhos  94  09-17    Creatinine Trend: 1.54 <--, 1.64 <--                        14.1   7.53  )-----------( 166      ( 18 Sep 2024 05:55 )             43.9     Urine Studies:  Urinalysis Basic - ( 18 Sep 2024 05:55 )    Color:  / Appearance:  / SG:  / pH:   Gluc: 120 mg/dL / Ketone:   / Bili:  / Urobili:    Blood:  / Protein:  / Nitrite:    Leuk Esterase:  / RBC:  / WBC    Sq Epi:  / Non Sq Epi:  / Bacteria:           RADIOLOGY & ADDITIONAL STUDIES:    < from: CT Angio Abdomen and Pelvis w/ IV Cont (09.17.24 @ 10:15) >  IMPRESSION: No aortic dissection and/or intramural hematoma noted.    Findings suggestive of pulmonary edema.    --- End of Report ---    < end of copied text >      < from: CT Angio Abdomen and Pelvis w/ IV Cont (09.17.24 @ 10:15) >  Both kidneys enhance with contrast. Low-attenuation lesion in the right   kidney is too small to be adequately characterized on this exam.    < end of copi    < from: Xray Chest 1 View- PORTABLE-Urgent (Xray Chest 1 View- PORTABLE-Urgent .) (09.17.24 @ 09:43) >  IMPRESSION:    Mild perihilar opacities suggestive of mild pulmonary edema.    --- End of Report ---    < end of copied text >  ed text >

## 2024-09-19 ENCOUNTER — RESULT REVIEW (OUTPATIENT)
Age: 69
End: 2024-09-19

## 2024-09-19 LAB
ANION GAP SERPL CALC-SCNC: 13 MMOL/L — SIGNIFICANT CHANGE UP (ref 7–14)
BUN SERPL-MCNC: 25 MG/DL — HIGH (ref 7–23)
CALCIUM SERPL-MCNC: 9.1 MG/DL — SIGNIFICANT CHANGE UP (ref 8.4–10.5)
CHLORIDE SERPL-SCNC: 108 MMOL/L — HIGH (ref 98–107)
CO2 SERPL-SCNC: 22 MMOL/L — SIGNIFICANT CHANGE UP (ref 22–31)
CREAT SERPL-MCNC: 1.59 MG/DL — HIGH (ref 0.5–1.3)
EGFR: 47 ML/MIN/1.73M2 — LOW
GLUCOSE SERPL-MCNC: 161 MG/DL — HIGH (ref 70–99)
HCT VFR BLD CALC: 43 % — SIGNIFICANT CHANGE UP (ref 39–50)
HGB BLD-MCNC: 13.8 G/DL — SIGNIFICANT CHANGE UP (ref 13–17)
MAGNESIUM SERPL-MCNC: 2.2 MG/DL — SIGNIFICANT CHANGE UP (ref 1.6–2.6)
MCHC RBC-ENTMCNC: 28.6 PG — SIGNIFICANT CHANGE UP (ref 27–34)
MCHC RBC-ENTMCNC: 32.1 GM/DL — SIGNIFICANT CHANGE UP (ref 32–36)
MCV RBC AUTO: 89.2 FL — SIGNIFICANT CHANGE UP (ref 80–100)
NRBC # BLD: 0 /100 WBCS — SIGNIFICANT CHANGE UP (ref 0–0)
NRBC # FLD: 0 K/UL — SIGNIFICANT CHANGE UP (ref 0–0)
PHOSPHATE SERPL-MCNC: 3.5 MG/DL — SIGNIFICANT CHANGE UP (ref 2.5–4.5)
PLATELET # BLD AUTO: 156 K/UL — SIGNIFICANT CHANGE UP (ref 150–400)
POTASSIUM SERPL-MCNC: 4 MMOL/L — SIGNIFICANT CHANGE UP (ref 3.5–5.3)
POTASSIUM SERPL-SCNC: 4 MMOL/L — SIGNIFICANT CHANGE UP (ref 3.5–5.3)
RBC # BLD: 4.82 M/UL — SIGNIFICANT CHANGE UP (ref 4.2–5.8)
RBC # FLD: 14.3 % — SIGNIFICANT CHANGE UP (ref 10.3–14.5)
SODIUM SERPL-SCNC: 143 MMOL/L — SIGNIFICANT CHANGE UP (ref 135–145)
WBC # BLD: 7.37 K/UL — SIGNIFICANT CHANGE UP (ref 3.8–10.5)
WBC # FLD AUTO: 7.37 K/UL — SIGNIFICANT CHANGE UP (ref 3.8–10.5)

## 2024-09-19 PROCEDURE — 76376 3D RENDER W/INTRP POSTPROCES: CPT | Mod: 26

## 2024-09-19 PROCEDURE — 93306 TTE W/DOPPLER COMPLETE: CPT | Mod: 26

## 2024-09-19 RX ORDER — ACETYLCYSTEINE
1200 POWDER (GRAM) MISCELLANEOUS EVERY 12 HOURS
Refills: 0 | Status: COMPLETED | OUTPATIENT
Start: 2024-09-19 | End: 2024-09-21

## 2024-09-19 RX ADMIN — TIOTROPIUM BROMIDE INHALATION SPRAY 2 PUFF(S): 3.12 SPRAY, METERED RESPIRATORY (INHALATION) at 14:05

## 2024-09-19 RX ADMIN — Medication 5000 UNIT(S): at 17:28

## 2024-09-19 RX ADMIN — FUROSEMIDE 40 MILLIGRAM(S): 10 INJECTION INTRAVENOUS at 06:19

## 2024-09-19 RX ADMIN — Medication 2 PUFF(S): at 14:06

## 2024-09-19 RX ADMIN — LABETALOL HYDROCHLORIDE 300 MILLIGRAM(S): 200 TABLET, FILM COATED ORAL at 06:18

## 2024-09-19 RX ADMIN — Medication 2 PUFF(S): at 06:19

## 2024-09-19 RX ADMIN — FUROSEMIDE 40 MILLIGRAM(S): 10 INJECTION INTRAVENOUS at 17:29

## 2024-09-19 RX ADMIN — Medication 2: at 17:27

## 2024-09-19 RX ADMIN — Medication 81 MILLIGRAM(S): at 17:28

## 2024-09-19 RX ADMIN — Medication 5000 UNIT(S): at 06:19

## 2024-09-19 RX ADMIN — Medication 2 PUFF(S): at 22:16

## 2024-09-19 RX ADMIN — Medication 10 MILLIGRAM(S): at 06:19

## 2024-09-19 RX ADMIN — DOXAZOSIN 2 MILLIGRAM(S): 2 TABLET ORAL at 22:16

## 2024-09-19 RX ADMIN — Medication 2: at 08:14

## 2024-09-19 RX ADMIN — Medication 4: at 14:03

## 2024-09-19 RX ADMIN — LABETALOL HYDROCHLORIDE 300 MILLIGRAM(S): 200 TABLET, FILM COATED ORAL at 17:28

## 2024-09-19 RX ADMIN — CLONIDINE HYDROCHLORIDE 0.2 MILLIGRAM(S): 0.2 TABLET ORAL at 23:28

## 2024-09-19 RX ADMIN — HYDRALAZINE HYDROCHLORIDE 25 MILLIGRAM(S): 100 TABLET ORAL at 17:28

## 2024-09-19 RX ADMIN — CLONIDINE HYDROCHLORIDE 0.2 MILLIGRAM(S): 0.2 TABLET ORAL at 06:19

## 2024-09-19 RX ADMIN — HYDRALAZINE HYDROCHLORIDE 25 MILLIGRAM(S): 100 TABLET ORAL at 06:18

## 2024-09-20 LAB
ANION GAP SERPL CALC-SCNC: 13 MMOL/L — SIGNIFICANT CHANGE UP (ref 7–14)
APTT BLD: 30.7 SEC — SIGNIFICANT CHANGE UP (ref 24.5–35.6)
BLD GP AB SCN SERPL QL: NEGATIVE — SIGNIFICANT CHANGE UP
BUN SERPL-MCNC: 28 MG/DL — HIGH (ref 7–23)
CALCIUM SERPL-MCNC: 9.5 MG/DL — SIGNIFICANT CHANGE UP (ref 8.4–10.5)
CHLORIDE SERPL-SCNC: 107 MMOL/L — SIGNIFICANT CHANGE UP (ref 98–107)
CO2 SERPL-SCNC: 23 MMOL/L — SIGNIFICANT CHANGE UP (ref 22–31)
CREAT SERPL-MCNC: 1.61 MG/DL — HIGH (ref 0.5–1.3)
EGFR: 46 ML/MIN/1.73M2 — LOW
GLUCOSE SERPL-MCNC: 194 MG/DL — HIGH (ref 70–99)
HCT VFR BLD CALC: 44.5 % — SIGNIFICANT CHANGE UP (ref 39–50)
HGB BLD-MCNC: 14.4 G/DL — SIGNIFICANT CHANGE UP (ref 13–17)
INR BLD: 1.05 RATIO — SIGNIFICANT CHANGE UP (ref 0.85–1.18)
MAGNESIUM SERPL-MCNC: 2.3 MG/DL — SIGNIFICANT CHANGE UP (ref 1.6–2.6)
MCHC RBC-ENTMCNC: 28.5 PG — SIGNIFICANT CHANGE UP (ref 27–34)
MCHC RBC-ENTMCNC: 32.4 GM/DL — SIGNIFICANT CHANGE UP (ref 32–36)
MCV RBC AUTO: 88.1 FL — SIGNIFICANT CHANGE UP (ref 80–100)
NRBC # BLD: 0 /100 WBCS — SIGNIFICANT CHANGE UP (ref 0–0)
NRBC # FLD: 0 K/UL — SIGNIFICANT CHANGE UP (ref 0–0)
PHOSPHATE SERPL-MCNC: 2.9 MG/DL — SIGNIFICANT CHANGE UP (ref 2.5–4.5)
PLATELET # BLD AUTO: 177 K/UL — SIGNIFICANT CHANGE UP (ref 150–400)
POTASSIUM SERPL-MCNC: 4 MMOL/L — SIGNIFICANT CHANGE UP (ref 3.5–5.3)
POTASSIUM SERPL-SCNC: 4 MMOL/L — SIGNIFICANT CHANGE UP (ref 3.5–5.3)
PROTHROM AB SERPL-ACNC: 11.8 SEC — SIGNIFICANT CHANGE UP (ref 9.5–13)
RBC # BLD: 5.05 M/UL — SIGNIFICANT CHANGE UP (ref 4.2–5.8)
RBC # FLD: 14.2 % — SIGNIFICANT CHANGE UP (ref 10.3–14.5)
RH IG SCN BLD-IMP: POSITIVE — SIGNIFICANT CHANGE UP
SODIUM SERPL-SCNC: 143 MMOL/L — SIGNIFICANT CHANGE UP (ref 135–145)
WBC # BLD: 6.8 K/UL — SIGNIFICANT CHANGE UP (ref 3.8–10.5)
WBC # FLD AUTO: 6.8 K/UL — SIGNIFICANT CHANGE UP (ref 3.8–10.5)

## 2024-09-20 PROCEDURE — 93010 ELECTROCARDIOGRAM REPORT: CPT

## 2024-09-20 RX ADMIN — Medication 75 MILLIGRAM(S): at 09:15

## 2024-09-20 RX ADMIN — Medication 10 MILLIGRAM(S): at 05:30

## 2024-09-20 RX ADMIN — CLONIDINE HYDROCHLORIDE 0.2 MILLIGRAM(S): 0.2 TABLET ORAL at 05:32

## 2024-09-20 RX ADMIN — Medication 600 MILLIGRAM(S): at 00:09

## 2024-09-20 RX ADMIN — HYDRALAZINE HYDROCHLORIDE 25 MILLIGRAM(S): 100 TABLET ORAL at 18:22

## 2024-09-20 RX ADMIN — Medication 2 PUFF(S): at 21:46

## 2024-09-20 RX ADMIN — DOXAZOSIN 2 MILLIGRAM(S): 2 TABLET ORAL at 21:58

## 2024-09-20 RX ADMIN — Medication 2: at 18:19

## 2024-09-20 RX ADMIN — HYDRALAZINE HYDROCHLORIDE 25 MILLIGRAM(S): 100 TABLET ORAL at 05:30

## 2024-09-20 RX ADMIN — Medication 5000 UNIT(S): at 05:30

## 2024-09-20 RX ADMIN — Medication 1200 MILLIGRAM(S): at 21:54

## 2024-09-20 RX ADMIN — Medication 2 PUFF(S): at 05:31

## 2024-09-20 RX ADMIN — CLONIDINE HYDROCHLORIDE 0.2 MILLIGRAM(S): 0.2 TABLET ORAL at 21:48

## 2024-09-20 RX ADMIN — Medication 1200 MILLIGRAM(S): at 06:41

## 2024-09-20 RX ADMIN — Medication 2: at 08:37

## 2024-09-20 RX ADMIN — Medication 81 MILLIGRAM(S): at 09:15

## 2024-09-20 RX ADMIN — LABETALOL HYDROCHLORIDE 300 MILLIGRAM(S): 200 TABLET, FILM COATED ORAL at 05:30

## 2024-09-20 RX ADMIN — LABETALOL HYDROCHLORIDE 300 MILLIGRAM(S): 200 TABLET, FILM COATED ORAL at 16:19

## 2024-09-20 NOTE — CHART NOTE - NSCHARTNOTEFT_GEN_A_CORE
Patient is s/p cardiac cath- RRA access RFA angioseal closure device  Patient without any complaints. RRA Site is stable with no hematoma or active bleed noted. No swelling, dressing is clean/intact/dry. Right Radial pulse palpable.  strength is equal bilaterally. Patient has full ROM in right wrist. Capillary refill < 2 seconds. Right femoral access site is stable with no hematoma or active bleed noted. No swelling, dressing is clean/intact/dry. Patient without any complaints.  Right femoral pulse palpable. Strength is equal bilaterally. Patient has full ROM in all 4 extremities. Capillary refill < 2 seconds. No bruits heard on ascultation. Denies any new CP, SOB, numbness/tingling along the RLE. Will continue to monitor closely. Endorsed to [facility] to monitor sites. Heparin SQ to restart am on 9/21    Gricelda Hill NP-C  Medicine Department   [facility]   [id number]

## 2024-09-20 NOTE — PRE PROCEDURE NOTE - PRE PROCEDURE EVALUATION
Pre Procedural Sedation Evaluation    History and physical reviewed  Medications reviewed  Labs reviewed  EKG reviewed    Anticoagulation:  Heparin subc 9/20 at 0500  Dentures: None  Last PO intake:  9/20 at 0800    Pre-Procedure Physical Assessment  Mental status: Alert and oriented x 3  Level of consciousness: 1  Obstructive sleep apnea: No  Aspiration risk: No  Mallampati score: 2  ASA Classification: 2  Prior Sedative or Anesthesia Experience: No complications  Informed consent by responsible adult: Yes  Based on today's assessment, anesthesia consult requested: Yes Pre Procedural Sedation Evaluation    History and physical reviewed  Medications reviewed  Labs reviewed  EKG reviewed    Anticoagulation:  Heparin subc 9/20 at 0500  Dentures: None  Last PO intake:  9/20 at 0800    Pre-Procedure Physical Assessment  Mental status: Alert and oriented x 3  Level of consciousness: 1  Obstructive sleep apnea: No  Aspiration risk: No  Mallampati score: 4  ASA Classification: 2  Prior Sedative or Anesthesia Experience: No complications  Informed consent by responsible adult: Yes  Based on today's assessment, anesthesia consult requested: Yes

## 2024-09-20 NOTE — PROVIDER CONTACT NOTE (OTHER) - SITUATION
pt bp 160/93 HR 78 post CATH
pt needs on off tele to go to echo no events vitals stable
[phone]

## 2024-09-21 LAB
ANION GAP SERPL CALC-SCNC: 14 MMOL/L — SIGNIFICANT CHANGE UP (ref 7–14)
APTT BLD: 30.8 SEC — SIGNIFICANT CHANGE UP (ref 24.5–35.6)
BUN SERPL-MCNC: 26 MG/DL — HIGH (ref 7–23)
CALCIUM SERPL-MCNC: 8.3 MG/DL — LOW (ref 8.4–10.5)
CHLORIDE SERPL-SCNC: 106 MMOL/L — SIGNIFICANT CHANGE UP (ref 98–107)
CHOLEST SERPL-MCNC: 140 MG/DL — SIGNIFICANT CHANGE UP
CO2 SERPL-SCNC: 21 MMOL/L — LOW (ref 22–31)
CREAT SERPL-MCNC: 1.4 MG/DL — HIGH (ref 0.5–1.3)
EGFR: 54 ML/MIN/1.73M2 — LOW
GLUCOSE SERPL-MCNC: 174 MG/DL — HIGH (ref 70–99)
HCT VFR BLD CALC: 43.2 % — SIGNIFICANT CHANGE UP (ref 39–50)
HDLC SERPL-MCNC: 30 MG/DL — LOW
HGB BLD-MCNC: 14 G/DL — SIGNIFICANT CHANGE UP (ref 13–17)
LIPID PNL WITH DIRECT LDL SERPL: 83 MG/DL — SIGNIFICANT CHANGE UP
MAGNESIUM SERPL-MCNC: 2.2 MG/DL — SIGNIFICANT CHANGE UP (ref 1.6–2.6)
MCHC RBC-ENTMCNC: 28.6 PG — SIGNIFICANT CHANGE UP (ref 27–34)
MCHC RBC-ENTMCNC: 32.4 GM/DL — SIGNIFICANT CHANGE UP (ref 32–36)
MCV RBC AUTO: 88.2 FL — SIGNIFICANT CHANGE UP (ref 80–100)
NON HDL CHOLESTEROL: 110 MG/DL — SIGNIFICANT CHANGE UP
NRBC # BLD: 0 /100 WBCS — SIGNIFICANT CHANGE UP (ref 0–0)
NRBC # FLD: 0 K/UL — SIGNIFICANT CHANGE UP (ref 0–0)
PHOSPHATE SERPL-MCNC: 3 MG/DL — SIGNIFICANT CHANGE UP (ref 2.5–4.5)
PLATELET # BLD AUTO: 165 K/UL — SIGNIFICANT CHANGE UP (ref 150–400)
POTASSIUM SERPL-MCNC: 4 MMOL/L — SIGNIFICANT CHANGE UP (ref 3.5–5.3)
POTASSIUM SERPL-SCNC: 4 MMOL/L — SIGNIFICANT CHANGE UP (ref 3.5–5.3)
RBC # BLD: 4.9 M/UL — SIGNIFICANT CHANGE UP (ref 4.2–5.8)
RBC # FLD: 14 % — SIGNIFICANT CHANGE UP (ref 10.3–14.5)
SODIUM SERPL-SCNC: 141 MMOL/L — SIGNIFICANT CHANGE UP (ref 135–145)
TRIGL SERPL-MCNC: 133 MG/DL — SIGNIFICANT CHANGE UP
WBC # BLD: 6.8 K/UL — SIGNIFICANT CHANGE UP (ref 3.8–10.5)
WBC # FLD AUTO: 6.8 K/UL — SIGNIFICANT CHANGE UP (ref 3.8–10.5)

## 2024-09-21 RX ORDER — ISOSORBIDE MONONITRATE 30 MG
30 TABLET, EXTENDED RELEASE 24 HR ORAL DAILY
Refills: 0 | Status: DISCONTINUED | OUTPATIENT
Start: 2024-09-21 | End: 2024-09-24

## 2024-09-21 RX ADMIN — Medication 4: at 12:14

## 2024-09-21 RX ADMIN — LABETALOL HYDROCHLORIDE 300 MILLIGRAM(S): 200 TABLET, FILM COATED ORAL at 07:15

## 2024-09-21 RX ADMIN — Medication 2 PUFF(S): at 22:43

## 2024-09-21 RX ADMIN — Medication 2 PUFF(S): at 02:54

## 2024-09-21 RX ADMIN — Medication 2 PUFF(S): at 17:22

## 2024-09-21 RX ADMIN — CLONIDINE HYDROCHLORIDE 0.2 MILLIGRAM(S): 0.2 TABLET ORAL at 07:54

## 2024-09-21 RX ADMIN — TIOTROPIUM BROMIDE INHALATION SPRAY 2 PUFF(S): 3.12 SPRAY, METERED RESPIRATORY (INHALATION) at 08:59

## 2024-09-21 RX ADMIN — CLONIDINE HYDROCHLORIDE 0.2 MILLIGRAM(S): 0.2 TABLET ORAL at 23:32

## 2024-09-21 RX ADMIN — Medication 1200 MILLIGRAM(S): at 07:14

## 2024-09-21 RX ADMIN — Medication 30 MILLIGRAM(S): at 12:15

## 2024-09-21 RX ADMIN — Medication 5000 UNIT(S): at 17:22

## 2024-09-21 RX ADMIN — Medication 81 MILLIGRAM(S): at 12:14

## 2024-09-21 RX ADMIN — Medication 75 MILLIGRAM(S): at 12:14

## 2024-09-21 RX ADMIN — HYDRALAZINE HYDROCHLORIDE 25 MILLIGRAM(S): 100 TABLET ORAL at 07:14

## 2024-09-21 RX ADMIN — Medication 2 PUFF(S): at 07:15

## 2024-09-21 RX ADMIN — DOXAZOSIN 2 MILLIGRAM(S): 2 TABLET ORAL at 22:42

## 2024-09-21 RX ADMIN — HYDRALAZINE HYDROCHLORIDE 25 MILLIGRAM(S): 100 TABLET ORAL at 17:22

## 2024-09-21 RX ADMIN — Medication 10 MILLIGRAM(S): at 07:14

## 2024-09-21 RX ADMIN — LABETALOL HYDROCHLORIDE 300 MILLIGRAM(S): 200 TABLET, FILM COATED ORAL at 17:22

## 2024-09-21 RX ADMIN — Medication 5000 UNIT(S): at 07:14

## 2024-09-22 LAB
ANION GAP SERPL CALC-SCNC: 11 MMOL/L — SIGNIFICANT CHANGE UP (ref 7–14)
APTT BLD: 32.8 SEC — SIGNIFICANT CHANGE UP (ref 24.5–35.6)
BUN SERPL-MCNC: 28 MG/DL — HIGH (ref 7–23)
CALCIUM SERPL-MCNC: 9.1 MG/DL — SIGNIFICANT CHANGE UP (ref 8.4–10.5)
CHLORIDE SERPL-SCNC: 107 MMOL/L — SIGNIFICANT CHANGE UP (ref 98–107)
CO2 SERPL-SCNC: 24 MMOL/L — SIGNIFICANT CHANGE UP (ref 22–31)
CREAT SERPL-MCNC: 1.48 MG/DL — HIGH (ref 0.5–1.3)
EGFR: 51 ML/MIN/1.73M2 — LOW
GLUCOSE SERPL-MCNC: 252 MG/DL — HIGH (ref 70–99)
HCT VFR BLD CALC: 40.2 % — SIGNIFICANT CHANGE UP (ref 39–50)
HGB BLD-MCNC: 13.1 G/DL — SIGNIFICANT CHANGE UP (ref 13–17)
INR BLD: 1.05 RATIO — SIGNIFICANT CHANGE UP (ref 0.85–1.18)
MAGNESIUM SERPL-MCNC: 2.4 MG/DL — SIGNIFICANT CHANGE UP (ref 1.6–2.6)
MCHC RBC-ENTMCNC: 28.9 PG — SIGNIFICANT CHANGE UP (ref 27–34)
MCHC RBC-ENTMCNC: 32.6 GM/DL — SIGNIFICANT CHANGE UP (ref 32–36)
MCV RBC AUTO: 88.5 FL — SIGNIFICANT CHANGE UP (ref 80–100)
NRBC # BLD: 0 /100 WBCS — SIGNIFICANT CHANGE UP (ref 0–0)
NRBC # FLD: 0 K/UL — SIGNIFICANT CHANGE UP (ref 0–0)
PHOSPHATE SERPL-MCNC: 3.1 MG/DL — SIGNIFICANT CHANGE UP (ref 2.5–4.5)
PLATELET # BLD AUTO: 174 K/UL — SIGNIFICANT CHANGE UP (ref 150–400)
POTASSIUM SERPL-MCNC: 4.7 MMOL/L — SIGNIFICANT CHANGE UP (ref 3.5–5.3)
POTASSIUM SERPL-SCNC: 4.7 MMOL/L — SIGNIFICANT CHANGE UP (ref 3.5–5.3)
PROTHROM AB SERPL-ACNC: 11.9 SEC — SIGNIFICANT CHANGE UP (ref 9.5–13)
RBC # BLD: 4.54 M/UL — SIGNIFICANT CHANGE UP (ref 4.2–5.8)
RBC # FLD: 14.2 % — SIGNIFICANT CHANGE UP (ref 10.3–14.5)
SODIUM SERPL-SCNC: 142 MMOL/L — SIGNIFICANT CHANGE UP (ref 135–145)
WBC # BLD: 8.42 K/UL — SIGNIFICANT CHANGE UP (ref 3.8–10.5)
WBC # FLD AUTO: 8.42 K/UL — SIGNIFICANT CHANGE UP (ref 3.8–10.5)

## 2024-09-22 RX ORDER — ACETYLCYSTEINE
1200 POWDER (GRAM) MISCELLANEOUS EVERY 12 HOURS
Refills: 0 | Status: COMPLETED | OUTPATIENT
Start: 2024-09-22 | End: 2024-09-24

## 2024-09-22 RX ORDER — FUROSEMIDE 10 MG/ML
40 INJECTION INTRAVENOUS ONCE
Refills: 0 | Status: COMPLETED | OUTPATIENT
Start: 2024-09-22 | End: 2024-09-22

## 2024-09-22 RX ADMIN — Medication 81 MILLIGRAM(S): at 12:12

## 2024-09-22 RX ADMIN — DOXAZOSIN 2 MILLIGRAM(S): 2 TABLET ORAL at 22:17

## 2024-09-22 RX ADMIN — Medication 2 PUFF(S): at 09:53

## 2024-09-22 RX ADMIN — Medication 4: at 12:11

## 2024-09-22 RX ADMIN — CLONIDINE HYDROCHLORIDE 0.2 MILLIGRAM(S): 0.2 TABLET ORAL at 07:21

## 2024-09-22 RX ADMIN — Medication 5000 UNIT(S): at 07:14

## 2024-09-22 RX ADMIN — Medication 1200 MILLIGRAM(S): at 18:55

## 2024-09-22 RX ADMIN — LABETALOL HYDROCHLORIDE 300 MILLIGRAM(S): 200 TABLET, FILM COATED ORAL at 18:17

## 2024-09-22 RX ADMIN — HYDRALAZINE HYDROCHLORIDE 25 MILLIGRAM(S): 100 TABLET ORAL at 18:17

## 2024-09-22 RX ADMIN — Medication 2 PUFF(S): at 18:18

## 2024-09-22 RX ADMIN — Medication 10 MILLIGRAM(S): at 07:15

## 2024-09-22 RX ADMIN — HYDRALAZINE HYDROCHLORIDE 25 MILLIGRAM(S): 100 TABLET ORAL at 07:15

## 2024-09-22 RX ADMIN — FUROSEMIDE 40 MILLIGRAM(S): 10 INJECTION INTRAVENOUS at 12:45

## 2024-09-22 RX ADMIN — Medication 2 PUFF(S): at 22:17

## 2024-09-22 RX ADMIN — Medication 2: at 08:29

## 2024-09-22 RX ADMIN — Medication 75 MILLIGRAM(S): at 12:12

## 2024-09-22 RX ADMIN — Medication 30 MILLIGRAM(S): at 12:12

## 2024-09-22 RX ADMIN — LABETALOL HYDROCHLORIDE 300 MILLIGRAM(S): 200 TABLET, FILM COATED ORAL at 07:15

## 2024-09-22 RX ADMIN — CLONIDINE HYDROCHLORIDE 0.2 MILLIGRAM(S): 0.2 TABLET ORAL at 18:55

## 2024-09-22 RX ADMIN — TIOTROPIUM BROMIDE INHALATION SPRAY 2 PUFF(S): 3.12 SPRAY, METERED RESPIRATORY (INHALATION) at 09:54

## 2024-09-22 RX ADMIN — Medication 5000 UNIT(S): at 18:17

## 2024-09-22 RX ADMIN — Medication 2 PUFF(S): at 04:31

## 2024-09-23 LAB
ANION GAP SERPL CALC-SCNC: 15 MMOL/L — HIGH (ref 7–14)
APTT BLD: 32.4 SEC — SIGNIFICANT CHANGE UP (ref 24.5–35.6)
BUN SERPL-MCNC: 31 MG/DL — HIGH (ref 7–23)
CALCIUM SERPL-MCNC: 9.4 MG/DL — SIGNIFICANT CHANGE UP (ref 8.4–10.5)
CHLORIDE SERPL-SCNC: 105 MMOL/L — SIGNIFICANT CHANGE UP (ref 98–107)
CO2 SERPL-SCNC: 22 MMOL/L — SIGNIFICANT CHANGE UP (ref 22–31)
CREAT SERPL-MCNC: 1.53 MG/DL — HIGH (ref 0.5–1.3)
EGFR: 49 ML/MIN/1.73M2 — LOW
GLUCOSE SERPL-MCNC: 169 MG/DL — HIGH (ref 70–99)
HCT VFR BLD CALC: 43 % — SIGNIFICANT CHANGE UP (ref 39–50)
HGB BLD-MCNC: 13.9 G/DL — SIGNIFICANT CHANGE UP (ref 13–17)
INR BLD: 1 RATIO — SIGNIFICANT CHANGE UP (ref 0.85–1.18)
MAGNESIUM SERPL-MCNC: 2.4 MG/DL — SIGNIFICANT CHANGE UP (ref 1.6–2.6)
MCHC RBC-ENTMCNC: 28.5 PG — SIGNIFICANT CHANGE UP (ref 27–34)
MCHC RBC-ENTMCNC: 32.3 GM/DL — SIGNIFICANT CHANGE UP (ref 32–36)
MCV RBC AUTO: 88.3 FL — SIGNIFICANT CHANGE UP (ref 80–100)
NRBC # BLD: 0 /100 WBCS — SIGNIFICANT CHANGE UP (ref 0–0)
NRBC # FLD: 0 K/UL — SIGNIFICANT CHANGE UP (ref 0–0)
PHOSPHATE SERPL-MCNC: 3.2 MG/DL — SIGNIFICANT CHANGE UP (ref 2.5–4.5)
PLATELET # BLD AUTO: 170 K/UL — SIGNIFICANT CHANGE UP (ref 150–400)
POTASSIUM SERPL-MCNC: 4.3 MMOL/L — SIGNIFICANT CHANGE UP (ref 3.5–5.3)
POTASSIUM SERPL-SCNC: 4.3 MMOL/L — SIGNIFICANT CHANGE UP (ref 3.5–5.3)
PROTHROM AB SERPL-ACNC: 11.2 SEC — SIGNIFICANT CHANGE UP (ref 9.5–13)
RBC # BLD: 4.87 M/UL — SIGNIFICANT CHANGE UP (ref 4.2–5.8)
RBC # FLD: 14.4 % — SIGNIFICANT CHANGE UP (ref 10.3–14.5)
SODIUM SERPL-SCNC: 142 MMOL/L — SIGNIFICANT CHANGE UP (ref 135–145)
WBC # BLD: 6.74 K/UL — SIGNIFICANT CHANGE UP (ref 3.8–10.5)
WBC # FLD AUTO: 6.74 K/UL — SIGNIFICANT CHANGE UP (ref 3.8–10.5)

## 2024-09-23 PROCEDURE — 93010 ELECTROCARDIOGRAM REPORT: CPT

## 2024-09-23 PROCEDURE — 92978 ENDOLUMINL IVUS OCT C 1ST: CPT | Mod: 26,LD

## 2024-09-23 PROCEDURE — 93571 IV DOP VEL&/PRESS C FLO 1ST: CPT | Mod: 26,LD

## 2024-09-23 PROCEDURE — 92928 PRQ TCAT PLMT NTRAC ST 1 LES: CPT | Mod: LD

## 2024-09-23 PROCEDURE — 93454 CORONARY ARTERY ANGIO S&I: CPT | Mod: 26,59

## 2024-09-23 RX ORDER — ASPIRIN 325 MG
325 TABLET ORAL DAILY
Refills: 0 | Status: DISCONTINUED | OUTPATIENT
Start: 2024-09-24 | End: 2024-09-24

## 2024-09-23 RX ADMIN — HYDRALAZINE HYDROCHLORIDE 25 MILLIGRAM(S): 100 TABLET ORAL at 18:07

## 2024-09-23 RX ADMIN — Medication 75 MILLIGRAM(S): at 11:49

## 2024-09-23 RX ADMIN — Medication 4: at 08:14

## 2024-09-23 RX ADMIN — Medication 1200 MILLIGRAM(S): at 18:05

## 2024-09-23 RX ADMIN — TIOTROPIUM BROMIDE INHALATION SPRAY 2 PUFF(S): 3.12 SPRAY, METERED RESPIRATORY (INHALATION) at 11:50

## 2024-09-23 RX ADMIN — HYDRALAZINE HYDROCHLORIDE 25 MILLIGRAM(S): 100 TABLET ORAL at 06:31

## 2024-09-23 RX ADMIN — Medication 81 MILLIGRAM(S): at 11:49

## 2024-09-23 RX ADMIN — CLONIDINE HYDROCHLORIDE 0.2 MILLIGRAM(S): 0.2 TABLET ORAL at 18:53

## 2024-09-23 RX ADMIN — Medication 5000 UNIT(S): at 06:31

## 2024-09-23 RX ADMIN — Medication 2: at 12:01

## 2024-09-23 RX ADMIN — Medication 4: at 16:53

## 2024-09-23 RX ADMIN — LABETALOL HYDROCHLORIDE 300 MILLIGRAM(S): 200 TABLET, FILM COATED ORAL at 18:07

## 2024-09-23 RX ADMIN — Medication 2 PUFF(S): at 21:39

## 2024-09-23 RX ADMIN — CLONIDINE HYDROCHLORIDE 0.2 MILLIGRAM(S): 0.2 TABLET ORAL at 06:31

## 2024-09-23 RX ADMIN — Medication 1200 MILLIGRAM(S): at 06:31

## 2024-09-23 RX ADMIN — Medication 10 MILLIGRAM(S): at 06:31

## 2024-09-23 RX ADMIN — Medication 2 PUFF(S): at 06:27

## 2024-09-23 RX ADMIN — Medication 2 PUFF(S): at 18:09

## 2024-09-23 RX ADMIN — Medication 2 PUFF(S): at 11:50

## 2024-09-23 RX ADMIN — Medication 30 MILLIGRAM(S): at 11:51

## 2024-09-23 RX ADMIN — DOXAZOSIN 2 MILLIGRAM(S): 2 TABLET ORAL at 21:38

## 2024-09-23 RX ADMIN — LABETALOL HYDROCHLORIDE 300 MILLIGRAM(S): 200 TABLET, FILM COATED ORAL at 06:31

## 2024-09-23 NOTE — PRE PROCEDURE NOTE - PRE PROCEDURE EVALUATION
Pre Procedural Sedation Evaluation    History and physical reviewed  Medications reviewed  Labs reviewed  EKG reviewed    Anticoagulation:  Heparin subc 9/23 at 0600  Dentures: None  Last PO intake:  9/22 at 2000    Pre-Procedure Physical Assessment  Mental status: Alert and oriented x 3  Level of consciousness: 1  Obstructive sleep apnea: No  Aspiration risk: No  Mallampati score: 3  ASA Classification: 2  Prior Sedative or Anesthesia Experience: No complications  Informed consent by responsible adult: Yes  Based on today's assessment, anesthesia consult requested: Yes

## 2024-09-23 NOTE — CONSULT NOTE ADULT - SUBJECTIVE AND OBJECTIVE BOX
Reason for consult: LING evaluation    ASA 3 and LING[x  ]  ASA 4 and LING[  ]  ASA 3 or 4 and MAL 3 or 4 [ x ]    Chart reviewed.  PMHx, PSHx, Allergies, labs, and studies noted.  Airway assessed. Mallampati 3 airway.    NPO > 8 hours [x  ]    Recommendation:    [  ] The patient may receive moderate sedation.  [ x ] The patient is not a good candidate for moderate sedation but may receive anxiolysis.  [  ] The patient is not a good candidate for I.V. sedation. Recommend local anesthesia at the intervention site only.

## 2024-09-23 NOTE — CHART NOTE - NSCHARTNOTEFT_GEN_A_CORE
Overnight Medicine ACP Coverage    Patient is s/p cardiac cath via right radial access. Patient without any complaints. Site is stable with no hematoma or active bleed noted. No swelling, dressing is clean/intact/dry. Right Radial pulse palpable.  strength is equal bilaterally. Sensation intact bilaterally. Patient has full ROM in right wrist. Capillary refill < 2 seconds.     T(C): 36.5 (09-23-24 @ 18:11), Max: 36.8 (09-23-24 @ 01:44)  HR: 80 (09-23-24 @ 18:11) (67 - 87)  BP: 152/70 (09-23-24 @ 18:11) (115/72 - 152/70)  RR: 19 (09-23-24 @ 18:11) (11 - 19)  SpO2: 97% (09-23-24 @ 18:11) (93% - 100%)    Educated patient to inform nursing for any numbness and/or tingling of extremities, signs of bleeding, chest pain, shortness of breath or any other concerning symptoms. Patient verbalized understanding. Will continue to monitor.    Je Causey PA-C  Department of Medicine  [facility]   In House Page [phone]

## 2024-09-24 ENCOUNTER — TRANSCRIPTION ENCOUNTER (OUTPATIENT)
Age: 69
End: 2024-09-24

## 2024-09-24 VITALS
DIASTOLIC BLOOD PRESSURE: 83 MMHG | RESPIRATION RATE: 18 BRPM | OXYGEN SATURATION: 96 % | HEART RATE: 90 BPM | TEMPERATURE: 98 F | SYSTOLIC BLOOD PRESSURE: 137 MMHG

## 2024-09-24 LAB
ALBUMIN SERPL ELPH-MCNC: 3.8 G/DL — SIGNIFICANT CHANGE UP (ref 3.3–5)
ALP SERPL-CCNC: 98 U/L — SIGNIFICANT CHANGE UP (ref 40–120)
ALT FLD-CCNC: 46 U/L — HIGH (ref 4–41)
ANION GAP SERPL CALC-SCNC: 12 MMOL/L — SIGNIFICANT CHANGE UP (ref 7–14)
AST SERPL-CCNC: 42 U/L — HIGH (ref 4–40)
BASOPHILS # BLD AUTO: 0.03 K/UL — SIGNIFICANT CHANGE UP (ref 0–0.2)
BASOPHILS NFR BLD AUTO: 0.4 % — SIGNIFICANT CHANGE UP (ref 0–2)
BILIRUB SERPL-MCNC: 0.3 MG/DL — SIGNIFICANT CHANGE UP (ref 0.2–1.2)
BUN SERPL-MCNC: 29 MG/DL — HIGH (ref 7–23)
CALCIUM SERPL-MCNC: 9.3 MG/DL — SIGNIFICANT CHANGE UP (ref 8.4–10.5)
CHLORIDE SERPL-SCNC: 108 MMOL/L — HIGH (ref 98–107)
CO2 SERPL-SCNC: 21 MMOL/L — LOW (ref 22–31)
CREAT SERPL-MCNC: 1.45 MG/DL — HIGH (ref 0.5–1.3)
EGFR: 52 ML/MIN/1.73M2 — LOW
EOSINOPHIL # BLD AUTO: 0.25 K/UL — SIGNIFICANT CHANGE UP (ref 0–0.5)
EOSINOPHIL NFR BLD AUTO: 3.3 % — SIGNIFICANT CHANGE UP (ref 0–6)
GLUCOSE SERPL-MCNC: 163 MG/DL — HIGH (ref 70–99)
HCT VFR BLD CALC: 41.6 % — SIGNIFICANT CHANGE UP (ref 39–50)
HGB BLD-MCNC: 13.7 G/DL — SIGNIFICANT CHANGE UP (ref 13–17)
IANC: 5.32 K/UL — SIGNIFICANT CHANGE UP (ref 1.8–7.4)
IMM GRANULOCYTES NFR BLD AUTO: 0.5 % — SIGNIFICANT CHANGE UP (ref 0–0.9)
LYMPHOCYTES # BLD AUTO: 1.25 K/UL — SIGNIFICANT CHANGE UP (ref 1–3.3)
LYMPHOCYTES # BLD AUTO: 16.6 % — SIGNIFICANT CHANGE UP (ref 13–44)
MCHC RBC-ENTMCNC: 29.2 PG — SIGNIFICANT CHANGE UP (ref 27–34)
MCHC RBC-ENTMCNC: 32.9 GM/DL — SIGNIFICANT CHANGE UP (ref 32–36)
MCV RBC AUTO: 88.7 FL — SIGNIFICANT CHANGE UP (ref 80–100)
MONOCYTES # BLD AUTO: 0.64 K/UL — SIGNIFICANT CHANGE UP (ref 0–0.9)
MONOCYTES NFR BLD AUTO: 8.5 % — SIGNIFICANT CHANGE UP (ref 2–14)
NEUTROPHILS # BLD AUTO: 5.32 K/UL — SIGNIFICANT CHANGE UP (ref 1.8–7.4)
NEUTROPHILS NFR BLD AUTO: 70.7 % — SIGNIFICANT CHANGE UP (ref 43–77)
NRBC # BLD: 0 /100 WBCS — SIGNIFICANT CHANGE UP (ref 0–0)
NRBC # FLD: 0 K/UL — SIGNIFICANT CHANGE UP (ref 0–0)
PLATELET # BLD AUTO: 178 K/UL — SIGNIFICANT CHANGE UP (ref 150–400)
POTASSIUM SERPL-MCNC: 4.1 MMOL/L — SIGNIFICANT CHANGE UP (ref 3.5–5.3)
POTASSIUM SERPL-SCNC: 4.1 MMOL/L — SIGNIFICANT CHANGE UP (ref 3.5–5.3)
PROT SERPL-MCNC: 7 G/DL — SIGNIFICANT CHANGE UP (ref 6–8.3)
RBC # BLD: 4.69 M/UL — SIGNIFICANT CHANGE UP (ref 4.2–5.8)
RBC # FLD: 14.2 % — SIGNIFICANT CHANGE UP (ref 10.3–14.5)
SODIUM SERPL-SCNC: 141 MMOL/L — SIGNIFICANT CHANGE UP (ref 135–145)
WBC # BLD: 7.53 K/UL — SIGNIFICANT CHANGE UP (ref 3.8–10.5)
WBC # FLD AUTO: 7.53 K/UL — SIGNIFICANT CHANGE UP (ref 3.8–10.5)

## 2024-09-24 RX ORDER — FUROSEMIDE 10 MG/ML
1 INJECTION INTRAVENOUS
Qty: 30 | Refills: 0
Start: 2024-09-24 | End: 2024-10-23

## 2024-09-24 RX ORDER — ASPIRIN 325 MG
1 TABLET ORAL
Qty: 30 | Refills: 0
Start: 2024-09-24 | End: 2024-10-23

## 2024-09-24 RX ORDER — ISOSORBIDE MONONITRATE 30 MG
1 TABLET, EXTENDED RELEASE 24 HR ORAL
Qty: 30 | Refills: 0
Start: 2024-09-24 | End: 2024-10-23

## 2024-09-24 RX ADMIN — Medication 4: at 08:29

## 2024-09-24 RX ADMIN — Medication 30 MILLIGRAM(S): at 11:17

## 2024-09-24 RX ADMIN — CLONIDINE HYDROCHLORIDE 0.2 MILLIGRAM(S): 0.2 TABLET ORAL at 06:47

## 2024-09-24 RX ADMIN — TIOTROPIUM BROMIDE INHALATION SPRAY 2 PUFF(S): 3.12 SPRAY, METERED RESPIRATORY (INHALATION) at 09:44

## 2024-09-24 RX ADMIN — Medication 325 MILLIGRAM(S): at 11:17

## 2024-09-24 RX ADMIN — LABETALOL HYDROCHLORIDE 300 MILLIGRAM(S): 200 TABLET, FILM COATED ORAL at 06:47

## 2024-09-24 RX ADMIN — Medication 1200 MILLIGRAM(S): at 06:47

## 2024-09-24 RX ADMIN — Medication 75 MILLIGRAM(S): at 11:17

## 2024-09-24 RX ADMIN — Medication 2 PUFF(S): at 09:44

## 2024-09-24 RX ADMIN — Medication 10 MILLIGRAM(S): at 06:47

## 2024-09-24 RX ADMIN — HYDRALAZINE HYDROCHLORIDE 25 MILLIGRAM(S): 100 TABLET ORAL at 06:47

## 2024-09-24 RX ADMIN — Medication 2 PUFF(S): at 06:47

## 2024-09-24 NOTE — DIETITIAN INITIAL EVALUATION ADULT - NS FNS DIET ORDER
Diet, DASH/TLC:   Sodium & Cholesterol Restricted     Special Instructions for Nursing:  Sodium & Cholesterol Restricted (09-20-24 @ 12:29) [Active]

## 2024-09-24 NOTE — PROGRESS NOTE ADULT - ASSESSMENT
64 year old male with hx  Hypertension, Diabetes Mellitus, Aortic Regurgitation, Diastolic Heart Failure, CBD Stone, s/p Stent, PAD, Coronavirus 3/2020, Chronic Kidney Disease presenting with sob, uncontrolled HTN     #uncontrolled HTN   -pt reports he missed recent meds d/t SOB   -sys bp 200s on admission   -Cont outpt meds -labetalol 300mg TID, doxasozin 2mg daily in PM, clon 0.2mg BID, norvasc 10mg daily, and hydral 25mg BID  -remains off lisinopril given plans for repeat LHC and recent contrast.  -CT Angio Chest 9/17 negative for  aortic dissection and/or intramural hematoma. Findings suggestive of pulmonary edema.  -cont IVP Lasix 40 mg BID     #acute on chronic Diastolic CHF   -likely in the setting of uncontrolled HTN   -F/u echo   -hs trop x2 neg    -ecg with no ischemic changes, lVH with repol noted   -Cont ASA 81 mg daily   -CXR 9/17 -Mild perihilar opacities suggestive of mild pulmonary edema.  -sp LHC 9/17 - Moderate-severe stenoses in proximal RCA and proximal LAD. Occluded RPL1 (small caliber vessel). Moderate stenosis in distal Cx (small caliber vessel supplying a small territory) and mild atheroslcerosis in OM1. Elevated LVEDP 33 mmHg.  -Optimize medically. Check echo  -Cont lasix 40 mg IVP BID, can consider changing to Daily tomorrow - volume status much improved   -plan for proximal RCA and proximal LAD PCI once euvolemic.    # Aortic Regurgitation  -prior echo revealed stable AI, mild-moderate  -check ECHO     # PAD  -known moderate distal right SFA disease  -Cont ASA and continue to monitor clinically      dvt ppx 
64 year old male with hx  Hypertension, Diabetes Mellitus, Aortic Regurgitation, Diastolic Heart Failure, CBD Stone, s/p Stent, PAD, Coronavirus 3/2020, Chronic Kidney Disease presenting with sob, uncontrolled HTN     #uncontrolled HTN   -pt reports he missed recent meds d/t SOB   -sys bp 200s on admission   -Cont outpt meds -labetalol 300mg TID, doxasozin 2mg daily in PM, clon 0.2mg BID, norvasc 10mg daily, and hydral 25mg BID  -remains off lisinopril pending labs due to recent NANCY  -Mon Creat  -edema improved  -Trend BP, improving  -CT Angio Chest 9/17 negative for  aortic dissection and/or intramural hematoma. Findings suggestive of pulmonary edema.  -cont IVP Lasix BID    #acute on chronic Diastolic CHF   -likely in the setting of uncontrolled HTN   -F/u echo   -hs trop x2 neg    -ecg with no ischemic changes, lVH with repol noted   -Cont lasix 40 mg IVP BID, can consider changing to Daily tomorrow   -Cont ASA 81 mg daily   -CXR 9/17 -Mild perihilar opacities suggestive of mild pulmonary edema.  -sp LHC 9/17 - Moderate-severe stenoses in proximal RCA and proximal LAD. Occluded RPL1 (small caliber vessel). Moderate stenosis in distal Cx (small caliber vessel supplying a small territory) and mild atheroslcerosis in OM1. Elevated LVEDP 33 mmHg.  -Optimize medically. Check echo. MonitorBUN/creatinine.   -plan for proximal RCA and proximal LAD PCI once euvolemic.    # Aortic Regurgitation  -prior echo revealed stable AI, mild-moderate  -check ECHO     # PAD  -known moderate distal right SFA disease  -Cont ASA and continue to monitor clinically      dvt ppx 
69-year-old male with history of HTN, HLD, Aortic Regurgitation , Diastolic Heart Failure, CBD Stone s/p Stent, PAD, CKD, p/w SOB.   Pt developed SOB yesterday initially intermittent now constant overnight, came to ED for evaluation. No other associated symptom.        Problem/Plan - 1:  ·  Problem: Hypertensive urgency.   ·  Plan: Will continue home medications with hold parameters.  BP readings better.      Problem/Plan - 2:  ·  Problem: Acute dyspnea secondary to Acute diastolic CHF .   ·  Plan: Likely secondary to Pulmonary edema so diuresis per cardiology .  S/P cardiac cath.  IV Lasix started.      Problem/Plan - 3:  ·  Problem: DM (diabetes mellitus).   ·  Plan: Lantus and SSI while in patient .     Problem/Plan - 4:  ·  Problem: Stage 3 chronic kidney disease.   ·  Plan: Trending Creatinine and better.   Will recommend renal consult.     Problem/Plan - 5:  ·  Problem: COPD without exacerbation.   ·  Plan: continue inhalers.     Problem/Plan - 6:  ·  Problem: Medication management.   ·  Plan: Doesn't know her medications so will have her daughter later bring or call with list.      
69y Male with history of long standing HTN and DM presents with SOB. Nephrology consulted for elevated Scr.    1) CKD-3a: Baseline Scr 1.5-1.7 as per PMD likely due to long standing HTN and DM. Scr @ baseline despite LHC on 9/20. Defer further work up as patient follows with an outpatient nephrologist. Avoid nephrotoxins. Monitor electrolytes.    2) HTN with CKD: BP controlled. Continue with current medications. Holding home lisinopril given plans for repeat LHC on 9/23.    3) LE edema: Improving s/p diuresis. Currently off of lasix. TTE with normal LVSF. Monitor UO.    4) CAD: Patient educated that he has a 14% risk of developing JASVIR and 0.1% risk of requiring RRT if LHC performed on 9/23. Will restart mucomyst 1200 mg PO Q12 hours X 4 doses on Sunday evening.      [facility]  Jorge Hurst M.D.  Shawn Corbin D.O.  Lilli Gallagher M.D.  MD Zoila Franklin, MSN, ANP-C    Telephone: [phone]  Facsimile: [phone], [address]  [place] [address]  
64 year old male with hx  Hypertension, Diabetes Mellitus, Aortic Regurgitation, Diastolic Heart Failure, CBD Stone, s/p Stent, PAD, Coronavirus 3/2020, Chronic Kidney Disease presenting with sob, uncontrolled HTN     #uncontrolled HTN   -pt reports he missed recent meds d/t SOB   -sys bp 200s on admission   -Cont outpt meds -labetalol 300mg TID, doxasozin 2mg daily in PM, clon 0.2mg BID, norvasc 10mg daily, and hydral 25mg BID  -remains off lisinopril given plans for repeat LHC and recent contrast.  -CT Angio Chest 9/17 negative for  aortic dissection and/or intramural hematoma. Findings suggestive of pulmonary edema.      #acute on chronic Diastolic CHF   -likely in the setting of uncontrolled HTN   -echo w normal LVEF and mod AR  -hs trop x2 neg    -ecg with no ischemic changes, lVH with repol noted   -Cont ASA 81 mg daily   -CXR 9/17 -Mild perihilar opacities suggestive of mild pulmonary edema.  -sp LHC 9/17 - Moderate-severe stenoses in proximal RCA and proximal LAD. Occluded RPL1 (small caliber vessel). Moderate stenosis in distal Cx (small caliber vessel supplying a small territory) and mild atheroslcerosis in OM1. Elevated LVEDP 33 mmHg.  -Optimize medically. Check echo  -s/p PCI to RCA plan for staged PCI to LAD tomorrow    # Aortic Regurgitation  -prior echo revealed stable AI, mild-moderate  -echo w normal LVEF and mod AR     # PAD  -known moderate distal right SFA disease  -Cont ASA and continue to monitor clinically      dvt ppx     35 minutes spent on total encounter; more than 50% of the visit was spent counseling and/or coordinating care by the attending physician.  
64 year old male with hx  Hypertension, Diabetes Mellitus, Aortic Regurgitation, Diastolic Heart Failure, CBD Stone, s/p Stent, PAD, Coronavirus 3/2020, Chronic Kidney Disease presenting with sob, uncontrolled HTN     #uncontrolled HTN   -pt reports he missed recent meds d/t SOB   -sys bp 200s on admission   -stable BP  -cont labetalol 300mg--Can increase to  TID, clon 0.2mg BID, norvasc 10mg daily, and hydral 25mg BID, doxazosin at bedtime   -remains off lisinopril given  sp HC and recent contrast.-- likely resume as outpt   -CT Angio Chest 9/17 negative for  aortic dissection and/or intramural hematoma. Findings suggestive of pulmonary edema.    #acute on chronic Diastolic CHF   -stable, euvolemic   -in the setting of uncontrolled HTN   -echo w normal LVEF and mod AR  -hs trop x2 neg    -ecg with no ischemic changes, lVH with repol noted   -CXR 9/17 -Mild perihilar opacities suggestive of mild pulmonary edema.  -sp LHC 9/17 - Moderate-severe stenoses in proximal RCA and proximal LAD. Occluded RPL1 (small caliber vessel). Moderate stenosis in distal Cx (small caliber vessel supplying a small territory) and mild atheroslcerosis in OM1. Elevated LVEDP 33 mmHg.  -echo with nl lv fxn  -s/p PCI to RCA   -9/24 sp cath Successful PCI to proximal LAD (iFR 0.83) with JESSICA x2 (treated 2 lesions in proximal segment) for treatment of CAD.IVUS was used for PCI optimization.   -RESUME  daily lasix 40 qd    # Aortic Regurgitation  -echo w normal LVEF and mod AR     # PAD  -known moderate distal right SFA disease  -Cont ASA and continue to monitor clinically    #CAD, s/p PCI  -sp PCIs to rca/lad as mentioned above   -c/w  x1 month -- then 81 mg daily   -c/w Plavix 75 mg daily   -start STATIN     DCP TODAY-- FU appt for 10/2 at 12:30 pm in [facility] office with Dr triplett  d/w ACP    dvt ppx   
69-year-old male with history of HTN, HLD, Aortic Regurgitation , Diastolic Heart Failure, CBD Stone s/p Stent, PAD, CKD, p/w SOB.   Pt developed SOB yesterday initially intermittent now constant overnight, came to ED for evaluation. No other associated symptom.        Problem/Plan - 1:  ·  Problem: Hypertensive urgency.   ·  Plan: Will continue home medications with hold parameters.  BP readings better.      Problem/Plan - 2:  ·  Problem: Acute dyspnea secondary to Acute diastolic CHF .   ·  Plan: Likely secondary to Pulmonary edema so diuresis per cardiology .  S/P cardiac cath and awaiting repeat cath..  IV Lasix started.     < from: TTE W or WO Ultrasound Enhancing Agent (09.19.24 @ 11:18) >  CONCLUSIONS:      1. Left ventricular cavity is normal in size. Left ventricular wall thickness is normal. Left ventricular systolic function is normal with an ejection fraction of 64 % by Caicedo's method of disks. There are no regional wall motion abnormalities seen. Mild left ventricular hypertrophy. There is mild (grade 1) left ventricular diastolic dysfunction.   2. Normal right ventricular cavity size and reduced right ventricular systolic function.   3. Normal left and right atrial size.   4. The aortic valve was not well visualized. There is no aortic valve stenosis. There is moderate aortic regurgitation. AI VMax is 2.58 m/s. AI pressure half time is 617 msec. AI slope is 1.22 m/s².   5. No pericardial effusion seen.   6. The inferior vena cava is normal in size measuring 1.22 cm in diameter, (normal <2.1cm) with normal inspiratory collapse (normal >50%) consistent with normal right atrial pressure (~3, range 0-5mmHg).     Problem/Plan - 3:  ·  Problem: DM (diabetes mellitus).   ·  Plan: Lantus and SSI while in patient .     Problem/Plan - 4:  ·  Problem: Stage 3 chronic kidney disease.   ·  Plan: Trending Creatinine and better.      Problem/Plan - 5:  ·  Problem: COPD without exacerbation.   ·  Plan: continue inhalers.     Problem/Plan - 6:  ·  Problem: Medication management.   ·  Plan: Continue ho  
69-year-old male with history of HTN, HLD, Aortic Regurgitation , Diastolic Heart Failure, CBD Stone s/p Stent, PAD, CKD, p/w SOB.   Pt developed SOB yesterday initially intermittent now constant overnight, came to ED for evaluation. No other associated symptom.        Problem/Plan - 1:  ·  Problem: Hypertensive urgency.   ·  Plan: Will continue home medications with hold parameters.  BP readings better.      Problem/Plan - 2:  ·  Problem: Acute dyspnea secondary to Acute diastolic CHF .   ·  Plan: Likely secondary to Pulmonary edema so diuresis per cardiology .  S/P cardiac cath and awaiting repeat cath..  IV Lasix started.     < from: TTE W or WO Ultrasound Enhancing Agent (09.19.24 @ 11:18) >  CONCLUSIONS:      1. Left ventricular cavity is normal in size. Left ventricular wall thickness is normal. Left ventricular systolic function is normal with an ejection fraction of 64 % by Caicedo's method of disks. There are no regional wall motion abnormalities seen. Mild left ventricular hypertrophy. There is mild (grade 1) left ventricular diastolic dysfunction.   2. Normal right ventricular cavity size and reduced right ventricular systolic function.   3. Normal left and right atrial size.   4. The aortic valve was not well visualized. There is no aortic valve stenosis. There is moderate aortic regurgitation. AI VMax is 2.58 m/s. AI pressure half time is 617 msec. AI slope is 1.22 m/s².   5. No pericardial effusion seen.   6. The inferior vena cava is normal in size measuring 1.22 cm in diameter, (normal <2.1cm) with normal inspiratory collapse (normal >50%) consistent with normal right atrial pressure (~3, range 0-5mmHg).     Problem/Plan - 3:  ·  Problem: DM (diabetes mellitus).   ·  Plan: Lantus and SSI while in patient .    Sugars in acceptable range.      Problem/Plan - 4:  ·  Problem: Stage 3 chronic kidney disease.   ·  Plan: Trending Creatinine and better.      Renal help appreciated.      Problem/Plan - 5:  ·  Problem: COPD without exacerbation.   ·  Plan: continue inhalers.     Problem/Plan - 6:  ·  Problem: CAD .   ·  Plan: S/P JESSICA and awaiting staged PCI .  on DAPT .  Will check fasting lipids as not on statin    
69-year-old male with history of HTN, HLD, Aortic Regurgitation , Diastolic Heart Failure, CBD Stone s/p Stent, PAD, CKD, p/w SOB.   Pt developed SOB yesterday initially intermittent now constant overnight, came to ED for evaluation. No other associated symptom.        Problem/Plan - 1:  ·  Problem: Hypertensive urgency.   ·  Plan: Will continue home medications with hold parameters.  BP readings better.      Problem/Plan - 2:  ·  Problem: Acute dyspnea secondary to Acute diastolic CHF with  Hypoxia .   ·  Plan: Likely secondary to Pulmonary edema so diuresis per cardiology .  S/P cardiac cath and awaiting repeat cath..  IV Lasix started.   Will try weaning  off Oxygen .  < from: TTE W or WO Ultrasound Enhancing Agent (09.19.24 @ 11:18) >  CONCLUSIONS:      1. Left ventricular cavity is normal in size. Left ventricular wall thickness is normal. Left ventricular systolic function is normal with an ejection fraction of 64 % by Caicedo's method of disks. There are no regional wall motion abnormalities seen. Mild left ventricular hypertrophy. There is mild (grade 1) left ventricular diastolic dysfunction.   2. Normal right ventricular cavity size and reduced right ventricular systolic function.   3. Normal left and right atrial size.   4. The aortic valve was not well visualized. There is no aortic valve stenosis. There is moderate aortic regurgitation. AI VMax is 2.58 m/s. AI pressure half time is 617 msec. AI slope is 1.22 m/s².   5. No pericardial effusion seen.   6. The inferior vena cava is normal in size measuring 1.22 cm in diameter, (normal <2.1cm) with normal inspiratory collapse (normal >50%) consistent with normal right atrial pressure (~3, range 0-5mmHg).     Problem/Plan - 3:  ·  Problem: DM (diabetes mellitus).   ·  Plan: Lantus and SSI while in patient .    Sugars in acceptable range.      Problem/Plan - 4:  ·  Problem: Stage 3 chronic kidney disease.   ·  Plan: Trending Creatinine and better.      Renal help appreciated.      Problem/Plan - 5:  ·  Problem: COPD without exacerbation.   ·  Plan: continue inhalers.     Problem/Plan - 6:  ·  Problem: CAD .   ·  Plan: S/P JESSICA and awaiting staged PCI .  on DAPT .  Will check fasting lipids as not on statin      
69-year-old male with history of HTN, HLD, Aortic Regurgitation , Diastolic Heart Failure, CBD Stone s/p Stent, PAD, CKD, p/w SOB.   Pt developed SOB yesterday initially intermittent now constant overnight, came to ED for evaluation. No other associated symptom.        Problem/Plan - 1:  ·  Problem: Hypertensive urgency.   ·  Plan: Will continue home medications with hold parameters.  BP readings better.      Problem/Plan - 2:  ·  Problem: Acute dyspnea secondary to Acute diastolic CHF with  Hypoxia .   ·  Plan: Likely secondary to Pulmonary edema so diuresis per cardiology .  S/P cardiac cath and awaiting repeat cath..  IV Lasix started.   Will try weaning  off Oxygen .  < from: TTE W or WO Ultrasound Enhancing Agent (09.19.24 @ 11:18) >  CONCLUSIONS:      1. Left ventricular cavity is normal in size. Left ventricular wall thickness is normal. Left ventricular systolic function is normal with an ejection fraction of 64 % by Caicedo's method of disks. There are no regional wall motion abnormalities seen. Mild left ventricular hypertrophy. There is mild (grade 1) left ventricular diastolic dysfunction.   2. Normal right ventricular cavity size and reduced right ventricular systolic function.   3. Normal left and right atrial size.   4. The aortic valve was not well visualized. There is no aortic valve stenosis. There is moderate aortic regurgitation. AI VMax is 2.58 m/s. AI pressure half time is 617 msec. AI slope is 1.22 m/s².   5. No pericardial effusion seen.   6. The inferior vena cava is normal in size measuring 1.22 cm in diameter, (normal <2.1cm) with normal inspiratory collapse (normal >50%) consistent with normal right atrial pressure (~3, range 0-5mmHg).     Problem/Plan - 3:  ·  Problem: DM (diabetes mellitus).   ·  Plan: Lantus and SSI while in patient .    Sugars in acceptable range.      Problem/Plan - 4:  ·  Problem: Stage 3 chronic kidney disease.   ·  Plan: Trending Creatinine and better.      Renal help appreciated.      Problem/Plan - 5:  ·  Problem: COPD without exacerbation.   ·  Plan: continue inhalers.     Problem/Plan - 6:  ·  Problem: CAD .   ·  Plan: S/P JESSICA and awaiting staged PCI .  on DAPT .  Will check fasting lipids as not on statin    Dispo : DC planning home to F/U outpt .  
69y Male with history of long standing HTN and DM presents with SOB. Nephrology consulted for elevated Scr.    1) CKD-3a: Baseline Scr 1.5-1.7 as per PMD likely due to long standing HTN and DM. Scr @ baseline despite LHC on 9/23. Defer further work up as patient follows with an outpatient nephrologist. Avoid nephrotoxins. Monitor electrolytes.    2) HTN with CKD: BP controlled. Continue with current medications. Holding home lisinopril but will resume as an outpatient as needed.    3) LE edema: Improved. Restart lasix 40 mg PO daily. TTE with normal LVSF. Monitor UO.    4) CAD: S/P LHC with PCI. As per cardiology.    No renal objection to discharge with outpatient follow up.      [facility]  Jorge Hurst M.D.  Shawn Corbin D.O.  Lilli Gallagher M.D.  MD Zoila Franklin, MSN, ANP-C    Telephone: [phone]  Facsimile: [phone]    [facility] [place] [address], [address]  [place] [address]  
69y Male with history of long standing HTN and DM presents with SOB. Nephrology consulted for elevated Scr.    1) CKD-3a: Baseline Scr 1.5-1.7 as per PMD likely due to long standing HTN and DM. Scr @ baseline. Defer further work up as patient follows with an outpatient nephrologist. Avoid nephrotoxins. Monitor electrolytes.    2) HTN with CKD: BP controlled. Continue with current medications. Holding home lisinopril given plans for repeat LHC today.    3) LE edema: Improving s/p lasix 40 mg IV X 1 dose on 9/22 for hypoxia. Plan to start lasix 40 mg PO daily on 9/24. TTE with normal LVSF. Monitor UO.    4) CAD: Patient educated that he has a 14% risk of developing JASVIR and 0.1% risk of requiring RRT if LHC performed today. Continue with mucomyst as ordered.      [facility] NEPHROLOG[facility]  Jorge Hurst M.D.  Shawn Corbin D.O.  Lilli Gallagher M.D.  MD Ziola Franklin, MSN, ANP-C    Telephone: [phone]  Facsimile: [phone]    [address] [place] [address], #[place] [address]  
69y Male with history of long standing HTN and DM presents with SOB. Nephrology consulted for elevated Scr.    1) CKD-3a: Baseline Scr 1.5-1.7 as per PMD likely due to long standing HTN and DM. Scr @ baseline. Defer further work up as patient follows with an outpatient nephrologist. Avoid nephrotoxins. Monitor electrolytes.    2) HTN with CKD: BP controlled. Continue with current medications. Holding home lisinopril given plans for repeat LHC today.    3) LE edema: Improving. Holding lasix this morning prior to LHC. Will restart diuretics pending repeat LVEDP. TTE with normal LVSF. Monitor UO.    4) CAD: Patient educated that he has a 14% risk of developing JASVIR and 0.1% risk of requiring RRT. Continue with mucomyst 1200 mg PO Q12 hours X 4 doses.      [facility] NEPHROLOG[facility]  Jorge Hurst M.D.  Shawn Corbin D.O.  Lilli Gallagher M.D.  MD Zoila Franklin, MSN, ANP-C    Telephone: [phone]  Facsimile: [phone] [place] [address], [address]  [place] [address]  
69-year-old male with history of HTN, HLD, Aortic Regurgitation , Diastolic Heart Failure, CBD Stone s/p Stent, PAD, CKD, p/w SOB.   Pt developed SOB yesterday initially intermittent now constant overnight, came to ED for evaluation. No other associated symptom.        Problem/Plan - 1:  ·  Problem: Hypertensive urgency.   ·  Plan: Will continue home medications with hold parameters.  BP readings better.      Problem/Plan - 2:  ·  Problem: Acute dyspnea secondary to Acute diastolic CHF with  Hypoxia .   ·  Plan: Likely secondary to Pulmonary edema so diuresis per cardiology .  S/P cardiac cath and awaiting repeat cath..  IV Lasix started.   Will try weaning  off Oxygen .  < from: TTE W or WO Ultrasound Enhancing Agent (09.19.24 @ 11:18) >  CONCLUSIONS:      1. Left ventricular cavity is normal in size. Left ventricular wall thickness is normal. Left ventricular systolic function is normal with an ejection fraction of 64 % by Caicedo's method of disks. There are no regional wall motion abnormalities seen. Mild left ventricular hypertrophy. There is mild (grade 1) left ventricular diastolic dysfunction.   2. Normal right ventricular cavity size and reduced right ventricular systolic function.   3. Normal left and right atrial size.   4. The aortic valve was not well visualized. There is no aortic valve stenosis. There is moderate aortic regurgitation. AI VMax is 2.58 m/s. AI pressure half time is 617 msec. AI slope is 1.22 m/s².   5. No pericardial effusion seen.   6. The inferior vena cava is normal in size measuring 1.22 cm in diameter, (normal <2.1cm) with normal inspiratory collapse (normal >50%) consistent with normal right atrial pressure (~3, range 0-5mmHg).     Problem/Plan - 3:  ·  Problem: DM (diabetes mellitus).   ·  Plan: Lantus and SSI while in patient .    Sugars in acceptable range.      Problem/Plan - 4:  ·  Problem: Stage 3 chronic kidney disease.   ·  Plan: Trending Creatinine and better.      Renal help appreciated.      Problem/Plan - 5:  ·  Problem: COPD without exacerbation.   ·  Plan: continue inhalers.     Problem/Plan - 6:  ·  Problem: CAD .   ·  Plan: S/P JESSICA and awaiting staged PCI .  on DAPT .  Will check fasting lipids as not on statin    
69y Male with history of long standing HTN and DM presents with SOB. Nephrology consulted for elevated Scr.    1) CKD-3a: Baseline Scr 1.5-1.7 as per PMD likely due to long standing HTN and DM. Scr stable despite CT with IV contrast and LHC on 9/17. Defer further work up as patient follows with an outpatient nephrologist. Avoid nephrotoxins. Monitor electrolytes.    2) HTN with CKD: BP controlled. Continue with current medications. Holding home lisinopril given plans for repeat LHC.    3) LE edema: With elevated LVEDP during LHC. Continue with lasix 40 mg IV twice daily as per cardiology. TTE with normal LVSF. Monitor UO.    4) CAD: For repeat LHC in AM? Patient educated that he has a 14% risk of developing JASVIR and 0.1% risk of requiring RRT. Would optimize by starting mucomyst 1200 mg PO Q12 hours X 4 doses this evening and holding diuretics on the day of procedure.      [facility] NEPHROLOGY  Jorge Hurst M.D.  Shawn Corbin D.O.  Lilli Gallagher M.D.  MD Zoila Franklin, MSN, ANP-C    Telephone: [phone]  Facsimile: [phone] [place] [address], #[facility]  [place] [address]  
64 year old male with hx  Hypertension, Diabetes Mellitus, Aortic Regurgitation, Diastolic Heart Failure, CBD Stone, s/p Stent, PAD, Coronavirus 3/2020, Chronic Kidney Disease presenting with sob, uncontrolled HTN     #uncontrolled HTN   -pt reports he missed recent meds d/t SOB   -sys bp 200s on admission   -Cont outpt meds -labetalol 300mg TID, doxasozin 2mg daily in PM, clon 0.2mg BID, norvasc 10mg daily, and hydral 25mg BID  -remains off lisinopril given plans for repeat LHC and recent contrast.  -CT Angio Chest 9/17 negative for  aortic dissection and/or intramural hematoma. Findings suggestive of pulmonary edema.      #acute on chronic Diastolic CHF   -likely in the setting of uncontrolled HTN   -echo w normal LVEF and mod AR  -hs trop x2 neg    -ecg with no ischemic changes, lVH with repol noted   -Cont ASA 81 mg daily   -CXR 9/17 -Mild perihilar opacities suggestive of mild pulmonary edema.  -sp LHC 9/17 - Moderate-severe stenoses in proximal RCA and proximal LAD. Occluded RPL1 (small caliber vessel). Moderate stenosis in distal Cx (small caliber vessel supplying a small territory) and mild atheroslcerosis in OM1. Elevated LVEDP 33 mmHg.  -Optimize medically. Check echo  -s/p PCI to RCA plan for staged PCI to LAD on monday.    # Aortic Regurgitation  -prior echo revealed stable AI, mild-moderate  -echo w normal LVEF and mod AR     # PAD  -known moderate distal right SFA disease  -Cont ASA and continue to monitor clinically      dvt ppx     35 minutes spent on total encounter; more than 50% of the visit was spent counseling and/or coordinating care by the attending physician.  
64 year old male with hx  Hypertension, Diabetes Mellitus, Aortic Regurgitation, Diastolic Heart Failure, CBD Stone, s/p Stent, PAD, Coronavirus 3/2020, Chronic Kidney Disease presenting with sob, uncontrolled HTN     #uncontrolled HTN   -pt reports he missed recent meds d/t SOB   -sys bp 200s on admission   -stable BP  -cont labetalol 300mg TID, doxasozin 2mg daily in PM, clon 0.2mg BID, norvasc 10mg daily, and hydral 25mg BID  -remains off lisinopril given plans for repeat LHC and recent contrast.  -CT Angio Chest 9/17 negative for  aortic dissection and/or intramural hematoma. Findings suggestive of pulmonary edema.    #acute on chronic Diastolic CHF   -stable, euvolemic   -in the setting of uncontrolled HTN   -echo w normal LVEF and mod AR  -hs trop x2 neg    -ecg with no ischemic changes, lVH with repol noted   -Cont ASA 81 mg daily   -CXR 9/17 -Mild perihilar opacities suggestive of mild pulmonary edema.  -sp LHC 9/17 - Moderate-severe stenoses in proximal RCA and proximal LAD. Occluded RPL1 (small caliber vessel). Moderate stenosis in distal Cx (small caliber vessel supplying a small territory) and mild atheroslcerosis in OM1. Elevated LVEDP 33 mmHg.  -echo with nl lv fxn  -s/p PCI to RCA   -staged PCI to LAD today  -d/c on daily lasix 40 qd    # Aortic Regurgitation  -echo w normal LVEF and mod AR     # PAD  -known moderate distal right SFA disease  -Cont ASA and continue to monitor clinically    #CAD, s/p PCI  -cont DAP  -staged pci to lad today    dvt ppx     55 minutes spent on total encounter; more than 50% of the visit was spent counseling and/or coordinating care by the attending physician.      dcp chandni if stable 
69-year-old male with history of HTN, HLD, Aortic Regurgitation , Diastolic Heart Failure, CBD Stone s/p Stent, PAD, CKD, p/w SOB.   Pt developed SOB yesterday initially intermittent now constant overnight, came to ED for evaluation. No other associated symptom.        Problem/Plan - 1:  ·  Problem: Hypertensive urgency.   ·  Plan: Will continue home medications with hold parameters.  BP readings better.      Problem/Plan - 2:  ·  Problem: Acute dyspnea secondary to Acute diastolic CHF .   ·  Plan: Likely secondary to Pulmonary edema so diuresis per cardiology .  S/P cardiac cath and awaiting repeat cath..  IV Lasix started.     < from: TTE W or WO Ultrasound Enhancing Agent (09.19.24 @ 11:18) >  CONCLUSIONS:      1. Left ventricular cavity is normal in size. Left ventricular wall thickness is normal. Left ventricular systolic function is normal with an ejection fraction of 64 % by Caicedo's method of disks. There are no regional wall motion abnormalities seen. Mild left ventricular hypertrophy. There is mild (grade 1) left ventricular diastolic dysfunction.   2. Normal right ventricular cavity size and reduced right ventricular systolic function.   3. Normal left and right atrial size.   4. The aortic valve was not well visualized. There is no aortic valve stenosis. There is moderate aortic regurgitation. AI VMax is 2.58 m/s. AI pressure half time is 617 msec. AI slope is 1.22 m/s².   5. No pericardial effusion seen.   6. The inferior vena cava is normal in size measuring 1.22 cm in diameter, (normal <2.1cm) with normal inspiratory collapse (normal >50%) consistent with normal right atrial pressure (~3, range 0-5mmHg).     Problem/Plan - 3:  ·  Problem: DM (diabetes mellitus).   ·  Plan: Lantus and SSI while in patient .    Sugars in acceptable range.      Problem/Plan - 4:  ·  Problem: Stage 3 chronic kidney disease.   ·  Plan: Trending Creatinine and better.      Renal help appreciated.      Problem/Plan - 5:  ·  Problem: COPD without exacerbation.   ·  Plan: continue inhalers.     Problem/Plan - 6:  ·  Problem: CAD .   ·  Plan: S/P JESSICA and awaiting staged PCI .  on DAPT .  Will check fasting lipids as not on statin  
69y Male with history of long standing HTN and DM presents with SOB. Nephrology consulted for elevated Scr.    1) CKD-3a: Baseline Scr 1.5-1.7 as per PMD likely due to long standing HTN and DM. Scr @ baseline despite LHC on 9/20. Defer further work up as patient follows with an outpatient nephrologist. Avoid nephrotoxins. Monitor electrolytes.    2) HTN with CKD: BP controlled. Continue with current medications. Holding home lisinopril given plans for repeat LHC on 9/23.    3) LE edema: Improving. Given hypoxia this morning, will give lasix 40 mg IV X 1 dose today. TTE with normal LVSF. Monitor UO.    4) CAD: Patient educated that he has a 14% risk of developing JASVIR and 0.1% risk of requiring RRT if LHC performed on 9/23. Start mucomyst 1200 mg PO Q12 hours X 4 doses this evening.      [facility] NEPHROLOG[facility]  Jorge Hurst M.D.  Shawn Corbin D.O.  Lilli Gallagher M.D.  MD Zoila Franklin, MSN, ANP-C    Telephone: [phone]  Facsimile: [phone] [id number], [address]  [place] [address]

## 2024-09-24 NOTE — DIETITIAN INITIAL EVALUATION ADULT - LITERATURE/VIDEOS GIVEN
Low Sodium Nutrition Therapy, Heart Healthy Nutrition Label Reading Tips, and Carbohydrate Counting for People with Diabetes

## 2024-09-24 NOTE — DISCHARGE NOTE NURSING/CASE MANAGEMENT/SOCIAL WORK - PATIENT PORTAL LINK FT
You can access the FollowMyHealth Patient Portal offered by [facility] by registering at the following website: [email or web address]. By joining [facility]’s FollowMyHealth portal, you will also be able to view your health information using other applications (apps) compatible with our system.

## 2024-09-24 NOTE — DIETITIAN INITIAL EVALUATION ADULT - OTHER INFO
Per chart review, 69-year-old male with history of HTN, HLD, Aortic Regurgitation , Diastolic Heart Failure, CBD Stone s/p Stent, PAD, CKD, p/w SOB. Pt developed SOB yesterday initially intermittent now constant overnight, came to ED for evaluation. No other associated symptom.     Patient seen at bedside. Reports his appetite remains good in hospital, PO intake varies from % as per RN flow sheet. Denies chewing and swallowing difficulties. Denies any GI distress (nausea/vomiting/diarrhea/constipation.) Last BM yesterday. Pt's labs notable with hyperglycemia with POCT from 157-232, currently on insulin regimen, recommend add Consistent Carbohydrate diet. Provided nutrition education on importance increase protein and fiber intake at each meal, reviewed nutrition label with patient. Provided pt with Low Sodium Nutrition Therapy, Heart Healthy Nutrition Label Reading Tips, and Carbohydrate Counting for People with Diabetes. RD to remain available for further nutritional interventions as indicated.

## 2024-09-24 NOTE — PROGRESS NOTE ADULT - TIME BILLING
agree with above  CV stable  s/p JESSICA placement to the prox LAD yest  cont DAPT  DC Home  Followup with us in 1-2 weeks
Agree with above ACP note.  cv stable and improved  vol status improved  likely tx to po diuretics chandni  plan for PCI chandni if renal fxn remains stable   plavix load today
agree with above  s/p cath revealing severe prox LAD and mod-severe prox RCA  plan to continue optimizing volume and renal status and plan for eventual PCI  continue current medical mgmt

## 2024-09-24 NOTE — PROGRESS NOTE ADULT - SUBJECTIVE AND OBJECTIVE BOX
68 y/o male with CAD for PCI today.  No history of anesthetic complications.  Mall 4, teeth intact.  Patient is full stomach, ate breakfast this morning, and therefore is not a candidate for sedation.
Asked to evaluate patient for cath.     patient on nasal cannula 2L with O2 95%;     airway evaluated. MP 3, ok mouth opening, good neck ROM; TM>6cm    patient ok for Conscious  sedation. 
CARDIOLOGY FOLLOW UP - Dr. South  DATE OF SERVICE: 9/24/24    CC no cp or sob        REVIEW OF SYSTEMS:  CONSTITUTIONAL: No fever, weight loss, or fatigue  RESPIRATORY: No cough, wheezing, chills or hemoptysis; No Shortness of Breath  CARDIOVASCULAR: No chest pain, palpitations, passing out, dizziness  GASTROINTESTINAL: No abdominal or epigastric pain. No nausea, vomiting, or hematemesis; No diarrhea or constipation. No melena or hematochezia.      PHYSICAL EXAM:  T(C): 36.8 (09-24-24 @ 06:45), Max: 36.8 (09-23-24 @ 12:20)  HR: 95 (09-24-24 @ 06:45) (70 - 95)  BP: 150/94 (09-24-24 @ 06:45) (129/79 - 152/70)  RR: 18 (09-24-24 @ 06:45) (12 - 19)  SpO2: 100% (09-24-24 @ 06:45) (93% - 100%)  Wt(kg): --  I&O's Summary      Appearance: Normal	  Cardiovascular: Normal S1 S2,RRR, No JVD, No murmurs  Respiratory: Lungs clear to auscultation	  Gastrointestinal:  Soft, Non-tender, + BS	  Extremities: Normal range of motion, No clubbing, cyanosis or edema  right wrist CDI no hematoma + pulses bl     Home Medications:  aspirin 81 mg oral tablet: 1 tab(s) orally once a day (02 Apr 2020 00:45)  clonidine 0.1 mg oral tablet: 2 tab(s) orally 2 times a day (02 Apr 2020 00:45)  doxazosin 2 mg oral tablet: 1 tab(s) orally once a day (02 Apr 2020 00:45)  labetalol 100 mg oral tablet: 3 tab(s) orally 2 times a day (02 Apr 2020 00:45)  Spiriva 18 mcg inhalation capsule: 1 each inhaled once a day (02 Apr 2020 00:45)  Symbicort 160 mcg-4.5 mcg/inh inhalation aerosol: 2 puff(s) inhaled 2 times a day (02 Apr 2020 00:45)      MEDICATIONS  (STANDING):  albuterol    90 MICROgram(s) HFA Inhaler 2 Puff(s) Inhalation every 6 hours  amLODIPine   Tablet 10 milliGRAM(s) Oral daily  aspirin enteric coated 325 milliGRAM(s) Oral daily  cloNIDine 0.2 milliGRAM(s) Oral two times a day  clopidogrel Tablet 75 milliGRAM(s) Oral daily  dextrose 5%. 1000 milliLiter(s) (100 mL/Hr) IV Continuous <Continuous>  dextrose 5%. 1000 milliLiter(s) (50 mL/Hr) IV Continuous <Continuous>  dextrose 50% Injectable 12.5 Gram(s) IV Push once  dextrose 50% Injectable 25 Gram(s) IV Push once  dextrose 50% Injectable 25 Gram(s) IV Push once  doxazosin 2 milliGRAM(s) Oral at bedtime  glucagon  Injectable 1 milliGRAM(s) IntraMuscular once  hydrALAZINE 25 milliGRAM(s) Oral two times a day  insulin lispro (ADMELOG) corrective regimen sliding scale   SubCutaneous three times a day before meals  insulin lispro (ADMELOG) corrective regimen sliding scale   SubCutaneous at bedtime  isosorbide   mononitrate ER Tablet (IMDUR) 30 milliGRAM(s) Oral daily  labetalol 300 milliGRAM(s) Oral two times a day  tiotropium 2.5 MICROgram(s) Inhaler 2 Puff(s) Inhalation daily      TELEMETRY: NSR 	    ECG:  	  RADIOLOGY:   DIAGNOSTIC TESTING:  [ ] Echocardiogram:  [ ]  Catheterization:  < from: Cardiac Catheterization (09.23.24 @ 13:29) >    Conclusions:   Successful PCI to proximal LAD (iFR 0.83) with JESSICA x2 (treated 2  lesions in proximal segment) for treatment of CAD.    IVUS was used for PCI optimization.     Recommendations:   Dual antiplatelet therapy for 6 months. Optimize medical therapy for  ischemic heart disease. Aggressive risk factor  modification.       < end of copied text >    [ ] Stress Test:    OTHER: 	    LABS:	 	    Troponin T, High Sensitivity Result: 28 ng/L (09-17 @ 10:59)                          13.7   7.53  )-----------( 178      ( 24 Sep 2024 06:23 )             41.6     09-24    141  |  108[H]  |  29[H]  ----------------------------<  163[H]  4.1   |  21[L]  |  1.45[H]    Ca    9.3      24 Sep 2024 06:23  Phos  3.2     09-23  Mg     2.40     09-23    TPro  7.0  /  Alb  3.8  /  TBili  0.3  /  DBili  x   /  AST  42[H]  /  ALT  46[H]  /  AlkPhos  98  09-24    PT/INR - ( 23 Sep 2024 06:55 )   PT: 11.2 sec;   INR: 1.00 ratio         PTT - ( 23 Sep 2024 06:55 )  PTT:32.4 sec        
Date of Service  : 09-20-24     INTERVAL HPI/OVERNIGHT EVENTS: Seen and examined earlier after cath.   Vital Signs Last 24 Hrs  T(C): 36.6 (20 Sep 2024 18:18), Max: 37.1 (20 Sep 2024 05:30)  T(F): 97.8 (20 Sep 2024 18:18), Max: 98.7 (20 Sep 2024 05:30)  HR: 76 (20 Sep 2024 18:18) (71 - 81)  BP: 136/79 (20 Sep 2024 18:18) (97/73 - 165/92)  BP(mean): 97 (19 Sep 2024 22:00) (97 - 97)  RR: 18 (20 Sep 2024 18:18) (16 - 20)  SpO2: 97% (20 Sep 2024 18:18) (94% - 100%)    Parameters below as of 20 Sep 2024 18:18  Patient On (Oxygen Delivery Method): nasal cannula  O2 Flow (L/min): 2    I&O's Summary    MEDICATIONS  (STANDING):  acetylcysteine  Oral Solution 1200 milliGRAM(s) Oral every 12 hours  albuterol    90 MICROgram(s) HFA Inhaler 2 Puff(s) Inhalation every 6 hours  amLODIPine   Tablet 10 milliGRAM(s) Oral daily  aspirin enteric coated 81 milliGRAM(s) Oral daily  cloNIDine 0.2 milliGRAM(s) Oral two times a day  clopidogrel Tablet 75 milliGRAM(s) Oral daily  dextrose 5%. 1000 milliLiter(s) (100 mL/Hr) IV Continuous <Continuous>  dextrose 5%. 1000 milliLiter(s) (50 mL/Hr) IV Continuous <Continuous>  dextrose 50% Injectable 25 Gram(s) IV Push once  dextrose 50% Injectable 12.5 Gram(s) IV Push once  dextrose 50% Injectable 25 Gram(s) IV Push once  doxazosin 2 milliGRAM(s) Oral at bedtime  glucagon  Injectable 1 milliGRAM(s) IntraMuscular once  heparin   Injectable 5000 Unit(s) SubCutaneous every 12 hours  hydrALAZINE 25 milliGRAM(s) Oral two times a day  insulin lispro (ADMELOG) corrective regimen sliding scale   SubCutaneous at bedtime  insulin lispro (ADMELOG) corrective regimen sliding scale   SubCutaneous three times a day before meals  labetalol 300 milliGRAM(s) Oral two times a day  tiotropium 2.5 MICROgram(s) Inhaler 2 Puff(s) Inhalation daily    MEDICATIONS  (PRN):  dextrose Oral Gel 15 Gram(s) Oral once PRN Blood Glucose LESS THAN 70 milliGRAM(s)/deciliter    LABS:                        14.4   6.80  )-----------( 177      ( 20 Sep 2024 05:50 )             44.5     09-20    143  |  107  |  28[H]  ----------------------------<  194[H]  4.0   |  23  |  1.61[H]    Ca    9.5      20 Sep 2024 05:50  Phos  2.9     09-20  Mg     2.30     09-20      PT/INR - ( 20 Sep 2024 05:50 )   PT: 11.8 sec;   INR: 1.05 ratio         PTT - ( 20 Sep 2024 05:50 )  PTT:30.7 sec  Urinalysis Basic - ( 20 Sep 2024 05:50 )    Color: x / Appearance: x / SG: x / pH: x  Gluc: 194 mg/dL / Ketone: x  / Bili: x / Urobili: x   Blood: x / Protein: x / Nitrite: x   Leuk Esterase: x / RBC: x / WBC x   Sq Epi: x / Non Sq Epi: x / Bacteria: x      CAPILLARY BLOOD GLUCOSE      POCT Blood Glucose.: 162 mg/dL (20 Sep 2024 17:23)  POCT Blood Glucose.: 189 mg/dL (20 Sep 2024 07:39)  POCT Blood Glucose.: 195 mg/dL (19 Sep 2024 22:15)        Urinalysis Basic - ( 20 Sep 2024 05:50 )    Color: x / Appearance: x / SG: x / pH: x  Gluc: 194 mg/dL / Ketone: x  / Bili: x / Urobili: x   Blood: x / Protein: x / Nitrite: x   Leuk Esterase: x / RBC: x / WBC x   Sq Epi: x / Non Sq Epi: x / Bacteria: x      REVIEW OF SYSTEMS:  CONSTITUTIONAL: No fever, weight loss, or fatigue  EYES: No eye pain, visual disturbances, or discharge  ENMT:  No difficulty hearing, tinnitus, vertigo; No sinus or throat pain  NECK: No pain or stiffness  RESPIRATORY: No cough, wheezing, chills or hemoptysis; No shortness of breath  CARDIOVASCULAR: No chest pain, palpitations, dizziness, or leg swelling  GASTROINTESTINAL: No abdominal or epigastric pain. No nausea, vomiting, or hematemesis; No diarrhea or constipation. No melena or hematochezia.  GENITOURINARY: No dysuria, frequency, hematuria, or incontinence  NEUROLOGICAL: No headaches, memory loss, loss of strength, numbness, or tremors    RADIOLOGY & ADDITIONAL TESTS:    Consultant(s) Notes Reviewed:  [x ] YES  [ ] NO    PHYSICAL EXAM:  GENERAL: NAD, well-groomed, well-developed,not in any distress ,  HEAD:  Atraumatic, Normocephalic  EYES: EOMI, PERRLA, conjunctiva and sclera clear  ENMT: No tonsillar erythema, exudates, or enlargement; Moist mucous membranes, Good dentition, No lesions  NECK: Supple, No JVD, Normal thyroid  NERVOUS SYSTEM:  Alert & Oriented X3, No focal deficit   CHEST/LUNG: Good air entry bilateral with no  rales, rhonchi, wheezing, or rubs  HEART: Regular rate and rhythm; No murmurs, rubs, or gallops  ABDOMEN: Soft, Nontender, Nondistended; Bowel sounds present  EXTREMITIES:  2+ Peripheral Pulses, No clubbing, cyanosis, or edema    Care Discussed with Consultants/Other Providers [ x] YES  [ ] NO
Date of Service  : 09-22-24     INTERVAL HPI/OVERNIGHT EVENTS: I fee better.   Vital Signs Last 24 Hrs  T(C): 36.6 (22 Sep 2024 15:32), Max: 37 (21 Sep 2024 21:27)  T(F): 97.8 (22 Sep 2024 15:32), Max: 98.6 (21 Sep 2024 21:27)  HR: 69 (22 Sep 2024 15:32) (69 - 78)  BP: 137/71 (22 Sep 2024 15:32) (122/70 - 154/74)  BP(mean): --  RR: 18 (22 Sep 2024 15:32) (18 - 18)  SpO2: 95% (22 Sep 2024 15:32) (95% - 97%)    Parameters below as of 22 Sep 2024 15:32  Patient On (Oxygen Delivery Method): nasal cannula  O2 Flow (L/min): 0.5    I&O's Summary    21 Sep 2024 07:01  -  22 Sep 2024 07:00  --------------------------------------------------------  IN: 0 mL / OUT: 500 mL / NET: -500 mL    22 Sep 2024 07:01  -  22 Sep 2024 18:14  --------------------------------------------------------  IN: 0 mL / OUT: 1150 mL / NET: -1150 mL      MEDICATIONS  (STANDING):  acetylcysteine  Oral Solution 1200 milliGRAM(s) Oral every 12 hours  albuterol    90 MICROgram(s) HFA Inhaler 2 Puff(s) Inhalation every 6 hours  amLODIPine   Tablet 10 milliGRAM(s) Oral daily  aspirin enteric coated 81 milliGRAM(s) Oral daily  cloNIDine 0.2 milliGRAM(s) Oral two times a day  clopidogrel Tablet 75 milliGRAM(s) Oral daily  dextrose 5%. 1000 milliLiter(s) (50 mL/Hr) IV Continuous <Continuous>  dextrose 5%. 1000 milliLiter(s) (100 mL/Hr) IV Continuous <Continuous>  dextrose 50% Injectable 25 Gram(s) IV Push once  dextrose 50% Injectable 12.5 Gram(s) IV Push once  dextrose 50% Injectable 25 Gram(s) IV Push once  doxazosin 2 milliGRAM(s) Oral at bedtime  glucagon  Injectable 1 milliGRAM(s) IntraMuscular once  heparin   Injectable 5000 Unit(s) SubCutaneous every 12 hours  hydrALAZINE 25 milliGRAM(s) Oral two times a day  insulin lispro (ADMELOG) corrective regimen sliding scale   SubCutaneous at bedtime  insulin lispro (ADMELOG) corrective regimen sliding scale   SubCutaneous three times a day before meals  isosorbide   mononitrate ER Tablet (IMDUR) 30 milliGRAM(s) Oral daily  labetalol 300 milliGRAM(s) Oral two times a day  tiotropium 2.5 MICROgram(s) Inhaler 2 Puff(s) Inhalation daily    MEDICATIONS  (PRN):  dextrose Oral Gel 15 Gram(s) Oral once PRN Blood Glucose LESS THAN 70 milliGRAM(s)/deciliter    LABS:                        13.1   8.42  )-----------( 174      ( 22 Sep 2024 10:40 )             40.2     09-22    142  |  107  |  28[H]  ----------------------------<  252[H]  4.7   |  24  |  1.48[H]    Ca    9.1      22 Sep 2024 10:40  Phos  3.1     09-22  Mg     2.40     09-22      PT/INR - ( 22 Sep 2024 10:40 )   PT: 11.9 sec;   INR: 1.05 ratio         PTT - ( 22 Sep 2024 10:40 )  PTT:32.8 sec  Urinalysis Basic - ( 22 Sep 2024 10:40 )    Color: x / Appearance: x / SG: x / pH: x  Gluc: 252 mg/dL / Ketone: x  / Bili: x / Urobili: x   Blood: x / Protein: x / Nitrite: x   Leuk Esterase: x / RBC: x / WBC x   Sq Epi: x / Non Sq Epi: x / Bacteria: x      CAPILLARY BLOOD GLUCOSE      POCT Blood Glucose.: 109 mg/dL (22 Sep 2024 17:04)  POCT Blood Glucose.: 227 mg/dL (22 Sep 2024 11:53)  POCT Blood Glucose.: 171 mg/dL (22 Sep 2024 07:52)  POCT Blood Glucose.: 153 mg/dL (21 Sep 2024 21:49)        Urinalysis Basic - ( 22 Sep 2024 10:40 )    Color: x / Appearance: x / SG: x / pH: x  Gluc: 252 mg/dL / Ketone: x  / Bili: x / Urobili: x   Blood: x / Protein: x / Nitrite: x   Leuk Esterase: x / RBC: x / WBC x   Sq Epi: x / Non Sq Epi: x / Bacteria: x      REVIEW OF SYSTEMS:  CONSTITUTIONAL: No fever, weight loss, or fatigue  EYES: No eye pain, visual disturbances, or discharge  ENMT:  No difficulty hearing, tinnitus, vertigo; No sinus or throat pain  NECK: No pain or stiffness  RESPIRATORY: No cough, wheezing, chills or hemoptysis; No shortness of breath  CARDIOVASCULAR: No chest pain, palpitations, dizziness, or leg swelling  GASTROINTESTINAL: No abdominal or epigastric pain. No nausea, vomiting, or hematemesis; No diarrhea or constipation. No melena or hematochezia.  GENITOURINARY: No dysuria, frequency, hematuria, or incontinence  NEUROLOGICAL: No headaches, memory loss, loss of strength, numbness, or tremors    RADIOLOGY & ADDITIONAL TESTS:    Consultant(s) Notes Reviewed:  [x ] YES  [ ] NO    PHYSICAL EXAM:  GENERAL: NAD, well-groomed, well-developed,not in any distress ,NC Oxygen   HEAD:  Atraumatic, Normocephalic  EYES: EOMI, PERRLA, conjunctiva and sclera clear  ENMT: No tonsillar erythema, exudates, or enlargement; Moist mucous membranes, Good dentition, No lesions  NECK: Supple, No JVD, Normal thyroid  NERVOUS SYSTEM:  Alert & Oriented X3, No focal deficit   CHEST/LUNG: Good air entry bilateral with no  rales, rhonchi, wheezing, or rubs  HEART: Regular rate and rhythm; No murmurs, rubs, or gallops  ABDOMEN: Soft, Nontender, Nondistended; Bowel sounds present  EXTREMITIES:  2+ Peripheral Pulses, No clubbing, cyanosis, or edema      Care Discussed with Consultants/Other Providers [ x] YES  [ ] NO
[facility] NEPHROLOGY- PROGRESS NOTE    69y Male with history of long standing HTN and DM presents with SOB. Nephrology consulted for elevated Scr.    REVIEW OF SYSTEMS:  Gen: no fevers  Cards: no chest pain  Resp: no dyspnea  GI: no nausea or vomiting or diarrhea  Vascular: no LE edema    No Known Allergies      Hospital Medications: Medications reviewed        VITALS:  T(F): 97.9 (09-22-24 @ 11:00), Max: 98.6 (09-21-24 @ 21:27)  HR: 78 (09-22-24 @ 11:00)  BP: 133/74 (09-22-24 @ 11:00)  RR: 18 (09-22-24 @ 11:00)  SpO2: 95% (09-22-24 @ 11:00)  Wt(kg): --    09-21 @ 07:01  -  09-22 @ 07:00  --------------------------------------------------------  IN: 0 mL / OUT: 500 mL / NET: -500 mL    09-22 @ 07:01  -  09-22 @ 12:14  --------------------------------------------------------  IN: 0 mL / OUT: 500 mL / NET: -500 mL        PHYSICAL EXAM:    Gen: NAD, calm  Cards: RRR, +S1/S2, no M/G/R  Resp: CTA B/L  GI: soft, NT/ND, NABS  Vascular: no LE edema B/L        LABS:  09-22    142  |  107  |  28[H]  ----------------------------<  252[H]  4.7   |  24  |  1.48[H]    Ca    9.1      22 Sep 2024 10:40  Phos  3.1     09-22  Mg     2.40     09-22      Creatinine Trend: 1.48 <--, 1.40 <--, 1.61 <--, 1.59 <--, 1.54 <--, 1.64 <--                        13.1   8.42  )-----------( 174      ( 22 Sep 2024 10:40 )             40.2     Urine Studies:  Urinalysis Basic - ( 22 Sep 2024 10:40 )    Color:  / Appearance:  / SG:  / pH:   Gluc: 252 mg/dL / Ketone:   / Bili:  / Urobili:    Blood:  / Protein:  / Nitrite:    Leuk Esterase:  / RBC:  / WBC    Sq Epi:  / Non Sq Epi:  / Bacteria:               
s/p cath with PCI to prox RCA  cont DAP  monitor renal fxn  poss return for LAD intervention monday vs d/c planning overweekend and return as outpt
CARDIOLOGY FOLLOW UP - Dr. South  Date of Service: 09-21-24 @ 09:48    CC: no cp/sob    Review of Systems:  Constitutional: No fever, weight loss, or fatigue  Respiratory: No cough, wheezing, or hemoptysis, no shortness of breath  Cardiovascular: No chest pain, palpitations, passing out, dizziness, or leg swelling  Gastrointestinal: No abd or epigastric pain. No nausea, vomiting, or hematemesis; no diarrhea or consiptaiton, no melena or hematochezia  Vascular: No edema     TELEMETRY:    PHYSICAL EXAM:  T(C): 36.8 (09-21-24 @ 06:50), Max: 36.9 (09-20-24 @ 21:30)  HR: 80 (09-21-24 @ 06:50) (71 - 80)  BP: 160/80 (09-21-24 @ 06:50) (136/79 - 167/90)  RR: 18 (09-21-24 @ 06:50) (16 - 20)  SpO2: 97% (09-21-24 @ 06:50) (95% - 98%)  Wt(kg): --  I&O's Summary    20 Sep 2024 07:01  -  21 Sep 2024 07:00  --------------------------------------------------------  IN: 50 mL / OUT: 0 mL / NET: 50 mL        Appearance: Normal	  Cardiovascular: Normal S1 S2,RRR, No JVD, No murmurs  Respiratory: Lungs clear to auscultation	  Gastrointestinal:  Soft, Non-tender, + BS	  Extremities: Normal range of motion, No clubbing, cyanosis or edema  Vascular: Peripheral pulses palpable 2+ bilaterally       Home Medications:  aspirin 81 mg oral tablet: 1 tab(s) orally once a day (02 Apr 2020 00:45)  clonidine 0.1 mg oral tablet: 2 tab(s) orally 2 times a day (02 Apr 2020 00:45)  doxazosin 2 mg oral tablet: 1 tab(s) orally once a day (02 Apr 2020 00:45)  labetalol 100 mg oral tablet: 3 tab(s) orally 2 times a day (02 Apr 2020 00:45)  Spiriva 18 mcg inhalation capsule: 1 each inhaled once a day (02 Apr 2020 00:45)  Symbicort 160 mcg-4.5 mcg/inh inhalation aerosol: 2 puff(s) inhaled 2 times a day (02 Apr 2020 00:45)        MEDICATIONS  (STANDING):  albuterol    90 MICROgram(s) HFA Inhaler 2 Puff(s) Inhalation every 6 hours  amLODIPine   Tablet 10 milliGRAM(s) Oral daily  aspirin enteric coated 81 milliGRAM(s) Oral daily  cloNIDine 0.2 milliGRAM(s) Oral two times a day  clopidogrel Tablet 75 milliGRAM(s) Oral daily  dextrose 5%. 1000 milliLiter(s) (50 mL/Hr) IV Continuous <Continuous>  dextrose 5%. 1000 milliLiter(s) (100 mL/Hr) IV Continuous <Continuous>  dextrose 50% Injectable 25 Gram(s) IV Push once  dextrose 50% Injectable 12.5 Gram(s) IV Push once  dextrose 50% Injectable 25 Gram(s) IV Push once  doxazosin 2 milliGRAM(s) Oral at bedtime  glucagon  Injectable 1 milliGRAM(s) IntraMuscular once  heparin   Injectable 5000 Unit(s) SubCutaneous every 12 hours  hydrALAZINE 25 milliGRAM(s) Oral two times a day  insulin lispro (ADMELOG) corrective regimen sliding scale   SubCutaneous three times a day before meals  insulin lispro (ADMELOG) corrective regimen sliding scale   SubCutaneous at bedtime  labetalol 300 milliGRAM(s) Oral two times a day  tiotropium 2.5 MICROgram(s) Inhaler 2 Puff(s) Inhalation daily        EKG:  RADIOLOGY:  DIAGNOSTIC TESTING:  [ ] Echocardiogram:  [ ] Catherterization:  [ ] Stress Test:  OTHER:     LABS:	 	                          14.0   6.80  )-----------( 165      ( 21 Sep 2024 05:52 )             43.2     09-21    141  |  106  |  26[H]  ----------------------------<  174[H]  4.0   |  21[L]  |  1.40[H]    Ca    8.3[L]      21 Sep 2024 05:52  Phos  3.0     09-21  Mg     2.20     09-21        PT/INR - ( 20 Sep 2024 05:50 )   PT: 11.8 sec;   INR: 1.05 ratio         PTT - ( 21 Sep 2024 05:52 )  PTT:30.8 sec    CARDIAC MARKERS:                  
CARDIOLOGY FOLLOW UP NOTE - DR. BRAUN    Patient Name: CIERA ROSA    Date of Service: 09-23-24 @ 11:01    Patient seen and examined    Subjective:    cv: denies chest pain, dyspnea, palpitations, dizziness  pulmonary: denies cough  GI: denies abdominal pain, nausea, vomiting  vascular/legs: no edema   skin: no rash  ROS: otherwise negative   overnight events:      PHYSICAL EXAM:  T(C): 36.4 (09-23-24 @ 09:15), Max: 36.8 (09-23-24 @ 01:44)  HR: 73 (09-23-24 @ 09:15) (67 - 84)  BP: 115/72 (09-23-24 @ 09:15) (115/72 - 163/90)  RR: 14 (09-23-24 @ 09:15) (11 - 24)  SpO2: 94% (09-23-24 @ 09:15) (94% - 100%)  Wt(kg): --  I&O's Summary    22 Sep 2024 07:01  -  23 Sep 2024 07:00  --------------------------------------------------------  IN: 0 mL / OUT: 1550 mL / NET: -1550 mL      Daily     Daily     Appearance: Normal	  Cardiovascular: Normal S1 S2,RRR, No JVD, No murmurs  Respiratory: Lungs clear to auscultation	  Gastrointestinal:  Soft, Non-tender, + BS	  Extremities: Normal range of motion, No clubbing, cyanosis or edema      Home Medications:  aspirin 81 mg oral tablet: 1 tab(s) orally once a day (02 Apr 2020 00:45)  clonidine 0.1 mg oral tablet: 2 tab(s) orally 2 times a day (02 Apr 2020 00:45)  doxazosin 2 mg oral tablet: 1 tab(s) orally once a day (02 Apr 2020 00:45)  labetalol 100 mg oral tablet: 3 tab(s) orally 2 times a day (02 Apr 2020 00:45)  Spiriva 18 mcg inhalation capsule: 1 each inhaled once a day (02 Apr 2020 00:45)  Symbicort 160 mcg-4.5 mcg/inh inhalation aerosol: 2 puff(s) inhaled 2 times a day (02 Apr 2020 00:45)      MEDICATIONS  (STANDING):  acetylcysteine  Oral Solution 1200 milliGRAM(s) Oral every 12 hours  albuterol    90 MICROgram(s) HFA Inhaler 2 Puff(s) Inhalation every 6 hours  amLODIPine   Tablet 10 milliGRAM(s) Oral daily  aspirin enteric coated 81 milliGRAM(s) Oral daily  cloNIDine 0.2 milliGRAM(s) Oral two times a day  clopidogrel Tablet 75 milliGRAM(s) Oral daily  dextrose 5%. 1000 milliLiter(s) (100 mL/Hr) IV Continuous <Continuous>  dextrose 5%. 1000 milliLiter(s) (50 mL/Hr) IV Continuous <Continuous>  dextrose 50% Injectable 12.5 Gram(s) IV Push once  dextrose 50% Injectable 25 Gram(s) IV Push once  dextrose 50% Injectable 25 Gram(s) IV Push once  doxazosin 2 milliGRAM(s) Oral at bedtime  glucagon  Injectable 1 milliGRAM(s) IntraMuscular once  hydrALAZINE 25 milliGRAM(s) Oral two times a day  insulin lispro (ADMELOG) corrective regimen sliding scale   SubCutaneous three times a day before meals  insulin lispro (ADMELOG) corrective regimen sliding scale   SubCutaneous at bedtime  isosorbide   mononitrate ER Tablet (IMDUR) 30 milliGRAM(s) Oral daily  labetalol 300 milliGRAM(s) Oral two times a day  tiotropium 2.5 MICROgram(s) Inhaler 2 Puff(s) Inhalation daily      TELEMETRY: 	    ECG:  	  RADIOLOGY:   DIAGNOSTIC TESTING:  [ ] Echocardiogram:  [ ] Catheterization:  [ ] Stress Test:    OTHER: 	    LABS:	 	    CARDIAC MARKERS:                                      13.9   6.74  )-----------( 170      ( 23 Sep 2024 06:55 )             43.0     09-23    142  |  105  |  31[H]  ----------------------------<  169[H]  4.3   |  22  |  1.53[H]    Ca    9.4      23 Sep 2024 06:55  Phos  3.2     09-23  Mg     2.40     09-23      proBNP:   PT/INR - ( 23 Sep 2024 06:55 )   PT: 11.2 sec;   INR: 1.00 ratio         PTT - ( 23 Sep 2024 06:55 )  PTT:32.4 sec  Lipid Profile:   HgA1c:     Creatinine: 1.53 mg/dL (09-23-24 @ 06:55)  Creatinine: 1.48 mg/dL (09-22-24 @ 10:40)  Creatinine: 1.40 mg/dL (09-21-24 @ 05:52)            
[facility] NEPHROLOGY- PROGRESS NOTE    69y Male with history of long standing HTN and DM presents with SOB. Nephrology consulted for elevated Scr.    REVIEW OF SYSTEMS:  Gen: no fevers  Cards: no chest pain  Resp: no dyspnea  GI: no nausea or vomiting or diarrhea  Vascular: no LE edema    No Known Allergies      Hospital Medications: Medications reviewed    VITALS:  T(F): 98 (09-19-24 @ 15:30), Max: 98.5 (09-19-24 @ 00:00)  HR: 82 (09-19-24 @ 15:30)  BP: 141/88 (09-19-24 @ 15:30)  RR: 17 (09-19-24 @ 15:30)  SpO2: 97% (09-19-24 @ 15:30)  Wt(kg): --  Height (cm): 185.4 (09-17 @ 11:57)  Weight (kg): 114.8 (09-17 @ 11:57)  BMI (kg/m2): 33.4 (09-17 @ 11:57)  BSA (m2): 2.38 (09-17 @ 11:57)      PHYSICAL EXAM:    Gen: NAD, calm  Cards: RRR, +S1/S2, no M/G/R  Resp: CTA B/L  GI: soft, NT/ND, NABS  Vascular: no LE edema B/L    LABS:  09-19    143  |  108[H]  |  25[H]  ----------------------------<  161[H]  4.0   |  22  |  1.59[H]    Ca    9.1      19 Sep 2024 06:08  Phos  3.5     09-19  Mg     2.20     09-19      Creatinine Trend: 1.59 <--, 1.54 <--, 1.64 <--                        13.8   7.37  )-----------( 156      ( 19 Sep 2024 06:08 )             43.0     Urine Studies:  Urinalysis Basic - ( 19 Sep 2024 06:08 )    Color:  / Appearance:  / SG:  / pH:   Gluc: 161 mg/dL / Ketone:   / Bili:  / Urobili:    Blood:  / Protein:  / Nitrite:    Leuk Esterase:  / RBC:  / WBC    Sq Epi:  / Non Sq Epi:  / Bacteria:           RADIOLOGY & ADDITIONAL STUDIES:
CARDIOLOGY FOLLOW UP - Dr. South  DATE OF SERVICE: 9/18/24    CC no CP or SOB      REVIEW OF SYSTEMS:  CONSTITUTIONAL: No fever, weight loss, or fatigue  RESPIRATORY: No cough, wheezing, chills or hemoptysis; No Shortness of Breath  CARDIOVASCULAR: No chest pain, palpitations, passing out, dizziness  GASTROINTESTINAL: No abdominal or epigastric pain. No nausea, vomiting, or hematemesis; No diarrhea or constipation. No melena or hematochezia.      PHYSICAL EXAM:  T(C): 36.9 (09-18-24 @ 06:14), Max: 37.1 (09-17-24 @ 13:10)  HR: 89 (09-18-24 @ 06:14) (81 - 101)  BP: 168/94 (09-18-24 @ 06:14) (132/85 - 209/121)  RR: 18 (09-18-24 @ 06:14) (15 - 24)  SpO2: 95% (09-18-24 @ 06:14) (93% - 100%)  Wt(kg): --  I&O's Summary    17 Sep 2024 07:01  -  18 Sep 2024 07:00  --------------------------------------------------------  IN: 240 mL / OUT: 950 mL / NET: -710 mL        Appearance: Normal	  Cardiovascular: Normal S1 S2,RRR, No JVD, No murmurs  Respiratory: Lungs clear to auscultation b/l  Gastrointestinal:  Soft, Non-tender, + BS	  Extremities: Normal range of motion, No clubbing, cyanosis or edema      Home Medications:  aspirin 81 mg oral tablet: 1 tab(s) orally once a day (02 Apr 2020 00:45)  clonidine 0.1 mg oral tablet: 2 tab(s) orally 2 times a day (02 Apr 2020 00:45)  doxazosin 2 mg oral tablet: 1 tab(s) orally once a day (02 Apr 2020 00:45)  labetalol 100 mg oral tablet: 3 tab(s) orally 2 times a day (02 Apr 2020 00:45)  Spiriva 18 mcg inhalation capsule: 1 each inhaled once a day (02 Apr 2020 00:45)  Symbicort 160 mcg-4.5 mcg/inh inhalation aerosol: 2 puff(s) inhaled 2 times a day (02 Apr 2020 00:45)      MEDICATIONS  (STANDING):  albuterol    90 MICROgram(s) HFA Inhaler 2 Puff(s) Inhalation every 6 hours  amLODIPine   Tablet 10 milliGRAM(s) Oral daily  aspirin enteric coated 81 milliGRAM(s) Oral daily  cloNIDine 0.2 milliGRAM(s) Oral two times a day  dextrose 5%. 1000 milliLiter(s) (100 mL/Hr) IV Continuous <Continuous>  dextrose 5%. 1000 milliLiter(s) (50 mL/Hr) IV Continuous <Continuous>  dextrose 50% Injectable 25 Gram(s) IV Push once  dextrose 50% Injectable 12.5 Gram(s) IV Push once  dextrose 50% Injectable 25 Gram(s) IV Push once  doxazosin 2 milliGRAM(s) Oral at bedtime  furosemide   Injectable 40 milliGRAM(s) IV Push two times a day  glucagon  Injectable 1 milliGRAM(s) IntraMuscular once  hydrALAZINE 25 milliGRAM(s) Oral two times a day  insulin lispro (ADMELOG) corrective regimen sliding scale   SubCutaneous at bedtime  insulin lispro (ADMELOG) corrective regimen sliding scale   SubCutaneous three times a day before meals  labetalol 300 milliGRAM(s) Oral two times a day  tiotropium 2.5 MICROgram(s) Inhaler 2 Puff(s) Inhalation daily      TELEMETRY: 	  NSR  ECG:  	  RADIOLOGY:   < from: CT Angio Chest Aorta w/wo IV Cont (09.17.24 @ 10:15) >  IMPRESSION: No aortic dissection and/or intramural hematoma noted.    Findings suggestive of pulmonary edema.    < end of copied text >    < from: Xray Chest 1 View- PORTABLE-Urgent (Xray Chest 1 View- PORTABLE-Urgent .) (09.17.24 @ 09:43) >  IMPRESSION:    Mild perihilar opacities suggestive of mild pulmonary edema.    < end of copied text >    DIAGNOSTIC TESTING:  [ ] Echocardiogram:  [ ]  Catheterization:  < from: Cardiac Catheterization (09.17.24 @ 12:41) >  Diagnostic Conclusions:   Moderate-severe stenoses in proximal RCA and proximal LAD. Occluded  RPL1 (small caliber vessel). Moderate stenosis in  distal Cx (small caliber vessel supplying a small territory) and mild  atheroslcerosis in OM1. Elevated LVEDP 33 mmHg.    Recommendations:   Optimize medical therapy for congestive heart failure. Aggressive risk  factor modification. Obtain an echo to assess his cardiac  structures and funciton. MonitorBUN/creatinine closely. May consider  proximal RCA and proximal LAD PCI once euvolemic.    < end of copied text >      [ ] Stress Test:    OTHER: 	    LABS:	 	    Troponin T, High Sensitivity Result: 28 ng/L (09-17 @ 10:59)  Troponin T, High Sensitivity Result: 28 ng/L (09-17 @ 07:07)                          14.1   7.53  )-----------( 166      ( 18 Sep 2024 05:55 )             43.9     09-18    145  |  109[H]  |  20  ----------------------------<  120[H]  3.7   |  24  |  1.54[H]    Ca    8.8      18 Sep 2024 05:55    TPro  7.2  /  Alb  3.9  /  TBili  0.5  /  DBili  x   /  AST  17  /  ALT  13  /  AlkPhos  94  09-17    PT/INR - ( 17 Sep 2024 07:07 )   PT: 11.3 sec;   INR: 1.01 ratio         PTT - ( 17 Sep 2024 07:07 )  PTT:31.5 sec        
CARDIOLOGY FOLLOW UP - Dr. South  DATE OF SERVICE: 9/19/24    CC no cp or sob       REVIEW OF SYSTEMS:  CONSTITUTIONAL: No fever, weight loss, or fatigue  RESPIRATORY: No cough, wheezing, chills or hemoptysis; No Shortness of Breath  CARDIOVASCULAR: No chest pain, palpitations, passing out, dizziness  GASTROINTESTINAL: No abdominal or epigastric pain. No nausea, vomiting, or hematemesis; No diarrhea or constipation. No melena or hematochezia.      PHYSICAL EXAM:  T(C): 36.7 (09-19-24 @ 05:30), Max: 36.9 (09-19-24 @ 00:00)  HR: 70 (09-19-24 @ 05:30) (70 - 79)  BP: 148/77 (09-19-24 @ 05:30) (122/68 - 158/85)  RR: 17 (09-19-24 @ 05:30) (17 - 20)  SpO2: 98% (09-19-24 @ 05:30) (95% - 99%)  Wt(kg): --  I&O's Summary      Appearance: Normal	  Cardiovascular: Normal S1 S2,RRR, No JVD, No murmurs  Respiratory: Lungs clear to auscultation	  Gastrointestinal:  Soft, Non-tender, + BS	  Extremities: Normal range of motion, No clubbing, cyanosis or edema      Home Medications:  aspirin 81 mg oral tablet: 1 tab(s) orally once a day (02 Apr 2020 00:45)  clonidine 0.1 mg oral tablet: 2 tab(s) orally 2 times a day (02 Apr 2020 00:45)  doxazosin 2 mg oral tablet: 1 tab(s) orally once a day (02 Apr 2020 00:45)  labetalol 100 mg oral tablet: 3 tab(s) orally 2 times a day (02 Apr 2020 00:45)  Spiriva 18 mcg inhalation capsule: 1 each inhaled once a day (02 Apr 2020 00:45)  Symbicort 160 mcg-4.5 mcg/inh inhalation aerosol: 2 puff(s) inhaled 2 times a day (02 Apr 2020 00:45)      MEDICATIONS  (STANDING):  albuterol    90 MICROgram(s) HFA Inhaler 2 Puff(s) Inhalation every 6 hours  amLODIPine   Tablet 10 milliGRAM(s) Oral daily  aspirin enteric coated 81 milliGRAM(s) Oral daily  cloNIDine 0.2 milliGRAM(s) Oral two times a day  dextrose 5%. 1000 milliLiter(s) (100 mL/Hr) IV Continuous <Continuous>  dextrose 5%. 1000 milliLiter(s) (50 mL/Hr) IV Continuous <Continuous>  dextrose 50% Injectable 25 Gram(s) IV Push once  dextrose 50% Injectable 12.5 Gram(s) IV Push once  dextrose 50% Injectable 25 Gram(s) IV Push once  doxazosin 2 milliGRAM(s) Oral at bedtime  furosemide   Injectable 40 milliGRAM(s) IV Push two times a day  glucagon  Injectable 1 milliGRAM(s) IntraMuscular once  heparin   Injectable 5000 Unit(s) SubCutaneous every 12 hours  hydrALAZINE 25 milliGRAM(s) Oral two times a day  insulin lispro (ADMELOG) corrective regimen sliding scale   SubCutaneous at bedtime  insulin lispro (ADMELOG) corrective regimen sliding scale   SubCutaneous three times a day before meals  labetalol 300 milliGRAM(s) Oral two times a day  tiotropium 2.5 MICROgram(s) Inhaler 2 Puff(s) Inhalation daily      TELEMETRY: 	nsr    ECG:  	  RADIOLOGY:   DIAGNOSTIC TESTING:  [ ] Echocardiogram:  [ ]  Catheterization:  [ ] Stress Test:    OTHER: 	    LABS:	 	    Troponin T, High Sensitivity Result: 28 ng/L (09-17 @ 10:59)  Troponin T, High Sensitivity Result: 28 ng/L (09-17 @ 07:07)                          13.8   7.37  )-----------( 156      ( 19 Sep 2024 06:08 )             43.0     09-19    143  |  108[H]  |  25[H]  ----------------------------<  161[H]  4.0   |  22  |  1.59[H]    Ca    9.1      19 Sep 2024 06:08  Phos  3.5     09-19  Mg     2.20     09-19              
Date of Service  : 09-18-24     INTERVAL HPI/OVERNIGHT EVENTS: I feel better.   Vital Signs Last 24 Hrs  T(C): 36.9 (18 Sep 2024 06:14), Max: 37.1 (17 Sep 2024 13:10)  T(F): 98.5 (18 Sep 2024 06:14), Max: 98.8 (17 Sep 2024 13:10)  HR: 89 (18 Sep 2024 06:14) (85 - 101)  BP: 168/94 (18 Sep 2024 06:14) (132/85 - 201/107)  BP(mean): 126 (17 Sep 2024 14:18) (126 - 126)  RR: 18 (18 Sep 2024 06:14) (15 - 21)  SpO2: 95% (18 Sep 2024 06:14) (93% - 100%)    Parameters below as of 18 Sep 2024 06:14  Patient On (Oxygen Delivery Method): nasal cannula  O2 Flow (L/min): 2    I&O's Summary    17 Sep 2024 07:01  -  18 Sep 2024 07:00  --------------------------------------------------------  IN: 240 mL / OUT: 950 mL / NET: -710 mL      MEDICATIONS  (STANDING):  albuterol    90 MICROgram(s) HFA Inhaler 2 Puff(s) Inhalation every 6 hours  amLODIPine   Tablet 10 milliGRAM(s) Oral daily  aspirin enteric coated 81 milliGRAM(s) Oral daily  cloNIDine 0.2 milliGRAM(s) Oral two times a day  dextrose 5%. 1000 milliLiter(s) (100 mL/Hr) IV Continuous <Continuous>  dextrose 5%. 1000 milliLiter(s) (50 mL/Hr) IV Continuous <Continuous>  dextrose 50% Injectable 25 Gram(s) IV Push once  dextrose 50% Injectable 12.5 Gram(s) IV Push once  dextrose 50% Injectable 25 Gram(s) IV Push once  doxazosin 2 milliGRAM(s) Oral at bedtime  furosemide   Injectable 40 milliGRAM(s) IV Push two times a day  glucagon  Injectable 1 milliGRAM(s) IntraMuscular once  hydrALAZINE 25 milliGRAM(s) Oral two times a day  insulin lispro (ADMELOG) corrective regimen sliding scale   SubCutaneous three times a day before meals  insulin lispro (ADMELOG) corrective regimen sliding scale   SubCutaneous at bedtime  labetalol 300 milliGRAM(s) Oral two times a day  tiotropium 2.5 MICROgram(s) Inhaler 2 Puff(s) Inhalation daily    MEDICATIONS  (PRN):  dextrose Oral Gel 15 Gram(s) Oral once PRN Blood Glucose LESS THAN 70 milliGRAM(s)/deciliter    LABS:                        14.1   7.53  )-----------( 166      ( 18 Sep 2024 05:55 )             43.9     09-18    145  |  109[H]  |  20  ----------------------------<  120[H]  3.7   |  24  |  1.54[H]    Ca    8.8      18 Sep 2024 05:55    TPro  7.2  /  Alb  3.9  /  TBili  0.5  /  DBili  x   /  AST  17  /  ALT  13  /  AlkPhos  94  09-17    PT/INR - ( 17 Sep 2024 07:07 )   PT: 11.3 sec;   INR: 1.01 ratio         PTT - ( 17 Sep 2024 07:07 )  PTT:31.5 sec  Urinalysis Basic - ( 18 Sep 2024 05:55 )    Color: x / Appearance: x / SG: x / pH: x  Gluc: 120 mg/dL / Ketone: x  / Bili: x / Urobili: x   Blood: x / Protein: x / Nitrite: x   Leuk Esterase: x / RBC: x / WBC x   Sq Epi: x / Non Sq Epi: x / Bacteria: x      CAPILLARY BLOOD GLUCOSE      POCT Blood Glucose.: 131 mg/dL (18 Sep 2024 07:04)  POCT Blood Glucose.: 134 mg/dL (17 Sep 2024 21:58)  POCT Blood Glucose.: 156 mg/dL (17 Sep 2024 16:22)        Urinalysis Basic - ( 18 Sep 2024 05:55 )    Color: x / Appearance: x / SG: x / pH: x  Gluc: 120 mg/dL / Ketone: x  / Bili: x / Urobili: x   Blood: x / Protein: x / Nitrite: x   Leuk Esterase: x / RBC: x / WBC x   Sq Epi: x / Non Sq Epi: x / Bacteria: x      REVIEW OF SYSTEMS:  CONSTITUTIONAL: No fever, weight loss, or fatigue  EYES: No eye pain, visual disturbances, or discharge  ENMT:  No difficulty hearing, tinnitus, vertigo; No sinus or throat pain  NECK: No pain or stiffness  RESPIRATORY: No cough, wheezing, chills or hemoptysis; No shortness of breath  CARDIOVASCULAR: No chest pain, palpitations, dizziness, or leg swelling  GASTROINTESTINAL: No abdominal or epigastric pain. No nausea, vomiting, or hematemesis; No diarrhea or constipation. No melena or hematochezia.  GENITOURINARY: No dysuria, frequency, hematuria, or incontinence  NEUROLOGICAL: No headaches, memory loss, loss of strength, numbness, or tremors      Consultant(s) Notes Reviewed:  [x ] YES  [ ] NO    PHYSICAL EXAM:  GENERAL: NAD, well-groomed, well-developed,not in any distress ,  HEAD:  Atraumatic, Normocephalic  EYES: EOMI, PERRLA, conjunctiva and sclera clear  ENMT: No tonsillar erythema, exudates, or enlargement; Moist mucous membranes, Good dentition, No lesions  NECK: Supple, No JVD, Normal thyroid  NERVOUS SYSTEM:  Alert & Oriented X3, No focal deficit   CHEST/LUNG: Good air entry bilateral with no  rales, rhonchi, wheezing, or rubs  HEART: Regular rate and rhythm; No murmurs, rubs, or gallops  ABDOMEN: Soft, Nontender, Nondistended; Bowel sounds present  EXTREMITIES:  2+ Peripheral Pulses, No clubbing, cyanosis, or edema  SKIN: No rashes or lesions    Care Discussed with Consultants/Other Providers [ x] YES  [ ] NO
Date of Service  : 09-19-24     INTERVAL HPI/OVERNIGHT EVENTS: I better.   Vital Signs Last 24 Hrs  T(C): 36.7 (19 Sep 2024 17:20), Max: 36.9 (19 Sep 2024 00:00)  T(F): 98 (19 Sep 2024 17:20), Max: 98.5 (19 Sep 2024 00:00)  HR: 85 (19 Sep 2024 17:20) (70 - 85)  BP: 165/93 (19 Sep 2024 17:20) (122/68 - 165/93)  BP(mean): --  RR: 17 (19 Sep 2024 17:20) (17 - 18)  SpO2: 94% (19 Sep 2024 17:20) (94% - 98%)    Parameters below as of 19 Sep 2024 17:20  Patient On (Oxygen Delivery Method): nasal cannula  O2 Flow (L/min): 3    I&O's Summary    MEDICATIONS  (STANDING):  acetylcysteine  Oral Solution 1200 milliGRAM(s) Oral every 12 hours  albuterol    90 MICROgram(s) HFA Inhaler 2 Puff(s) Inhalation every 6 hours  amLODIPine   Tablet 10 milliGRAM(s) Oral daily  aspirin enteric coated 81 milliGRAM(s) Oral daily  cloNIDine 0.2 milliGRAM(s) Oral two times a day  clopidogrel Tablet 600 milliGRAM(s) Oral once  dextrose 5%. 1000 milliLiter(s) (100 mL/Hr) IV Continuous <Continuous>  dextrose 5%. 1000 milliLiter(s) (50 mL/Hr) IV Continuous <Continuous>  dextrose 50% Injectable 12.5 Gram(s) IV Push once  dextrose 50% Injectable 25 Gram(s) IV Push once  dextrose 50% Injectable 25 Gram(s) IV Push once  doxazosin 2 milliGRAM(s) Oral at bedtime  glucagon  Injectable 1 milliGRAM(s) IntraMuscular once  heparin   Injectable 5000 Unit(s) SubCutaneous every 12 hours  hydrALAZINE 25 milliGRAM(s) Oral two times a day  insulin lispro (ADMELOG) corrective regimen sliding scale   SubCutaneous three times a day before meals  insulin lispro (ADMELOG) corrective regimen sliding scale   SubCutaneous at bedtime  labetalol 300 milliGRAM(s) Oral two times a day  tiotropium 2.5 MICROgram(s) Inhaler 2 Puff(s) Inhalation daily    MEDICATIONS  (PRN):  dextrose Oral Gel 15 Gram(s) Oral once PRN Blood Glucose LESS THAN 70 milliGRAM(s)/deciliter    LABS:                        13.8   7.37  )-----------( 156      ( 19 Sep 2024 06:08 )             43.0     09-19    143  |  108[H]  |  25[H]  ----------------------------<  161[H]  4.0   |  22  |  1.59[H]    Ca    9.1      19 Sep 2024 06:08  Phos  3.5     09-19  Mg     2.20     09-19        Urinalysis Basic - ( 19 Sep 2024 06:08 )    Color: x / Appearance: x / SG: x / pH: x  Gluc: 161 mg/dL / Ketone: x  / Bili: x / Urobili: x   Blood: x / Protein: x / Nitrite: x   Leuk Esterase: x / RBC: x / WBC x   Sq Epi: x / Non Sq Epi: x / Bacteria: x      CAPILLARY BLOOD GLUCOSE      POCT Blood Glucose.: 155 mg/dL (19 Sep 2024 17:00)  POCT Blood Glucose.: 213 mg/dL (19 Sep 2024 13:17)  POCT Blood Glucose.: 200 mg/dL (19 Sep 2024 11:55)  POCT Blood Glucose.: 163 mg/dL (19 Sep 2024 08:08)  POCT Blood Glucose.: 181 mg/dL (19 Sep 2024 00:44)  POCT Blood Glucose.: 209 mg/dL (18 Sep 2024 21:54)        Urinalysis Basic - ( 19 Sep 2024 06:08 )    Color: x / Appearance: x / SG: x / pH: x  Gluc: 161 mg/dL / Ketone: x  / Bili: x / Urobili: x   Blood: x / Protein: x / Nitrite: x   Leuk Esterase: x / RBC: x / WBC x   Sq Epi: x / Non Sq Epi: x / Bacteria: x      REVIEW OF SYSTEMS:  CONSTITUTIONAL: No fever, weight loss, or fatigue  EYES: No eye pain, visual disturbances, or discharge  ENMT:  No difficulty hearing, tinnitus, vertigo; No sinus or throat pain  NECK: No pain or stiffness  RESPIRATORY: No cough, wheezing, chills or hemoptysis; No shortness of breath  CARDIOVASCULAR: No chest pain, palpitations, dizziness, or leg swelling  GASTROINTESTINAL: No abdominal or epigastric pain. No nausea, vomiting, or hematemesis; No diarrhea or constipation. No melena or hematochezia.  GENITOURINARY: No dysuria, frequency, hematuria, or incontinence  NEUROLOGICAL: No headaches, memory loss, loss of strength, numbness, or tremors    Consultant(s) Notes Reviewed:  [x ] YES  [ ] NO    PHYSICAL EXAM:  GENERAL: NAD, well-groomed, well-developed,not in any distress ,  HEAD:  Atraumatic, Normocephalic  NECK: Supple, No JVD, Normal thyroid  NERVOUS SYSTEM:  Alert & Oriented X3, No focal deficit   CHEST/LUNG: Good air entry bilateral with no  rales, rhonchi, wheezing, or rubs  HEART: Regular rate and rhythm; No murmurs, rubs, or gallops  ABDOMEN: Soft, Nontender, Nondistended; Bowel sounds present  EXTREMITIES:  2+ Peripheral Pulses, No clubbing, cyanosis, or edema    Care Discussed with Consultants/Other Providers [ x] YES  [ ] NO
Date of Service  : 09-21-24     INTERVAL HPI/OVERNIGHT EVENTS: I feel fine.   Vital Signs Last 24 Hrs  T(C): 36.8 (21 Sep 2024 06:50), Max: 36.9 (20 Sep 2024 21:30)  T(F): 98.3 (21 Sep 2024 06:50), Max: 98.4 (20 Sep 2024 21:30)  HR: 80 (21 Sep 2024 06:50) (71 - 80)  BP: 160/80 (21 Sep 2024 06:50) (136/79 - 167/90)  BP(mean): --  RR: 18 (21 Sep 2024 06:50) (16 - 20)  SpO2: 97% (21 Sep 2024 06:50) (95% - 98%)    Parameters below as of 21 Sep 2024 06:50  Patient On (Oxygen Delivery Method): nasal cannula  O2 Flow (L/min): 2    I&O's Summary    20 Sep 2024 07:01  -  21 Sep 2024 07:00  --------------------------------------------------------  IN: 50 mL / OUT: 0 mL / NET: 50 mL      MEDICATIONS  (STANDING):  albuterol    90 MICROgram(s) HFA Inhaler 2 Puff(s) Inhalation every 6 hours  amLODIPine   Tablet 10 milliGRAM(s) Oral daily  aspirin enteric coated 81 milliGRAM(s) Oral daily  cloNIDine 0.2 milliGRAM(s) Oral two times a day  clopidogrel Tablet 75 milliGRAM(s) Oral daily  dextrose 5%. 1000 milliLiter(s) (100 mL/Hr) IV Continuous <Continuous>  dextrose 5%. 1000 milliLiter(s) (50 mL/Hr) IV Continuous <Continuous>  dextrose 50% Injectable 25 Gram(s) IV Push once  dextrose 50% Injectable 12.5 Gram(s) IV Push once  dextrose 50% Injectable 25 Gram(s) IV Push once  doxazosin 2 milliGRAM(s) Oral at bedtime  glucagon  Injectable 1 milliGRAM(s) IntraMuscular once  heparin   Injectable 5000 Unit(s) SubCutaneous every 12 hours  hydrALAZINE 25 milliGRAM(s) Oral two times a day  insulin lispro (ADMELOG) corrective regimen sliding scale   SubCutaneous three times a day before meals  insulin lispro (ADMELOG) corrective regimen sliding scale   SubCutaneous at bedtime  labetalol 300 milliGRAM(s) Oral two times a day  tiotropium 2.5 MICROgram(s) Inhaler 2 Puff(s) Inhalation daily    MEDICATIONS  (PRN):  dextrose Oral Gel 15 Gram(s) Oral once PRN Blood Glucose LESS THAN 70 milliGRAM(s)/deciliter    LABS:                        14.0   6.80  )-----------( 165      ( 21 Sep 2024 05:52 )             43.2     09-21    141  |  106  |  26[H]  ----------------------------<  174[H]  4.0   |  21[L]  |  1.40[H]    Ca    8.3[L]      21 Sep 2024 05:52  Phos  3.0     09-21  Mg     2.20     09-21      PT/INR - ( 20 Sep 2024 05:50 )   PT: 11.8 sec;   INR: 1.05 ratio         PTT - ( 21 Sep 2024 05:52 )  PTT:30.8 sec  Urinalysis Basic - ( 21 Sep 2024 05:52 )    Color: x / Appearance: x / SG: x / pH: x  Gluc: 174 mg/dL / Ketone: x  / Bili: x / Urobili: x   Blood: x / Protein: x / Nitrite: x   Leuk Esterase: x / RBC: x / WBC x   Sq Epi: x / Non Sq Epi: x / Bacteria: x      CAPILLARY BLOOD GLUCOSE      POCT Blood Glucose.: 149 mg/dL (21 Sep 2024 07:00)  POCT Blood Glucose.: 160 mg/dL (20 Sep 2024 22:11)  POCT Blood Glucose.: 162 mg/dL (20 Sep 2024 17:23)        Urinalysis Basic - ( 21 Sep 2024 05:52 )    Color: x / Appearance: x / SG: x / pH: x  Gluc: 174 mg/dL / Ketone: x  / Bili: x / Urobili: x   Blood: x / Protein: x / Nitrite: x   Leuk Esterase: x / RBC: x / WBC x   Sq Epi: x / Non Sq Epi: x / Bacteria: x      REVIEW OF SYSTEMS:  CONSTITUTIONAL: No fever, weight loss, or fatigue  EYES: No eye pain, visual disturbances, or discharge  ENMT:  No difficulty hearing, tinnitus, vertigo; No sinus or throat pain  NECK: No pain or stiffness  RESPIRATORY: No cough, wheezing, chills or hemoptysis; No shortness of breath  CARDIOVASCULAR: No chest pain, palpitations, dizziness, or leg swelling  GASTROINTESTINAL: No abdominal or epigastric pain. No nausea, vomiting, or hematemesis; No diarrhea or constipation. No melena or hematochezia.  GENITOURINARY: No dysuria, frequency, hematuria, or incontinence  NEUROLOGICAL: No headaches, memory loss, loss of strength, numbness, or tremors    Consultant(s) Notes Reviewed:  [x ] YES  [ ] NO    PHYSICAL EXAM:  GENERAL: NAD, well-groomed, well-developed,not in any distress ,  HEAD:  Atraumatic, Normocephalic  EYES: EOMI, PERRLA, conjunctiva and sclera clear  ENMT: No tonsillar erythema, exudates, or enlargement; Moist mucous membranes, Good dentition, No lesions  NECK: Supple, No JVD, Normal thyroid  NERVOUS SYSTEM:  Alert & Oriented X3, No focal deficit   CHEST/LUNG: Good air entry bilateral with no  rales, rhonchi, wheezing, or rubs  HEART: Regular rate and rhythm; No murmurs, rubs, or gallops  ABDOMEN: Soft, Nontender, Nondistended; Bowel sounds present  EXTREMITIES:  2+ Peripheral Pulses, No clubbing, cyanosis, or edema    Care Discussed with Consultants/Other Providers [ x] YES  [ ] NO
Date of Service  : 09-24-24     INTERVAL HPI/OVERNIGHT EVENTS: I feel fine.   Vital Signs Last 24 Hrs  T(C): 36.6 (24 Sep 2024 10:57), Max: 36.8 (24 Sep 2024 06:45)  T(F): 97.8 (24 Sep 2024 10:57), Max: 98.3 (24 Sep 2024 06:45)  HR: 88 (24 Sep 2024 10:57) (70 - 95)  BP: 124/83 (24 Sep 2024 10:57) (124/83 - 152/70)  BP(mean): --  RR: 18 (24 Sep 2024 10:57) (12 - 19)  SpO2: 98% (24 Sep 2024 10:57) (94% - 100%)    Parameters below as of 24 Sep 2024 10:57  Patient On (Oxygen Delivery Method): room air      I&O's Summary    MEDICATIONS  (STANDING):  albuterol    90 MICROgram(s) HFA Inhaler 2 Puff(s) Inhalation every 6 hours  amLODIPine   Tablet 10 milliGRAM(s) Oral daily  aspirin enteric coated 325 milliGRAM(s) Oral daily  cloNIDine 0.2 milliGRAM(s) Oral two times a day  clopidogrel Tablet 75 milliGRAM(s) Oral daily  dextrose 5%. 1000 milliLiter(s) (50 mL/Hr) IV Continuous <Continuous>  dextrose 5%. 1000 milliLiter(s) (100 mL/Hr) IV Continuous <Continuous>  dextrose 50% Injectable 25 Gram(s) IV Push once  dextrose 50% Injectable 12.5 Gram(s) IV Push once  dextrose 50% Injectable 25 Gram(s) IV Push once  doxazosin 2 milliGRAM(s) Oral at bedtime  glucagon  Injectable 1 milliGRAM(s) IntraMuscular once  hydrALAZINE 25 milliGRAM(s) Oral two times a day  insulin lispro (ADMELOG) corrective regimen sliding scale   SubCutaneous three times a day before meals  insulin lispro (ADMELOG) corrective regimen sliding scale   SubCutaneous at bedtime  isosorbide   mononitrate ER Tablet (IMDUR) 30 milliGRAM(s) Oral daily  labetalol 300 milliGRAM(s) Oral two times a day  tiotropium 2.5 MICROgram(s) Inhaler 2 Puff(s) Inhalation daily    MEDICATIONS  (PRN):  dextrose Oral Gel 15 Gram(s) Oral once PRN Blood Glucose LESS THAN 70 milliGRAM(s)/deciliter    LABS:                        13.7   7.53  )-----------( 178      ( 24 Sep 2024 06:23 )             41.6     09-24    141  |  108[H]  |  29[H]  ----------------------------<  163[H]  4.1   |  21[L]  |  1.45[H]    Ca    9.3      24 Sep 2024 06:23  Phos  3.2     09-23  Mg     2.40     09-23    TPro  7.0  /  Alb  3.8  /  TBili  0.3  /  DBili  x   /  AST  42[H]  /  ALT  46[H]  /  AlkPhos  98  09-24    PT/INR - ( 23 Sep 2024 06:55 )   PT: 11.2 sec;   INR: 1.00 ratio         PTT - ( 23 Sep 2024 06:55 )  PTT:32.4 sec  Urinalysis Basic - ( 24 Sep 2024 06:23 )    Color: x / Appearance: x / SG: x / pH: x  Gluc: 163 mg/dL / Ketone: x  / Bili: x / Urobili: x   Blood: x / Protein: x / Nitrite: x   Leuk Esterase: x / RBC: x / WBC x   Sq Epi: x / Non Sq Epi: x / Bacteria: x      CAPILLARY BLOOD GLUCOSE      POCT Blood Glucose.: 206 mg/dL (24 Sep 2024 07:41)  POCT Blood Glucose.: 157 mg/dL (23 Sep 2024 21:25)  POCT Blood Glucose.: 232 mg/dL (23 Sep 2024 16:32)        Urinalysis Basic - ( 24 Sep 2024 06:23 )    Color: x / Appearance: x / SG: x / pH: x  Gluc: 163 mg/dL / Ketone: x  / Bili: x / Urobili: x   Blood: x / Protein: x / Nitrite: x   Leuk Esterase: x / RBC: x / WBC x   Sq Epi: x / Non Sq Epi: x / Bacteria: x      REVIEW OF SYSTEMS:  CONSTITUTIONAL: No fever, weight loss, or fatigue  EYES: No eye pain, visual disturbances, or discharge  ENMT:  No difficulty hearing, tinnitus, vertigo; No sinus or throat pain  NECK: No pain or stiffness  RESPIRATORY: No cough, wheezing, chills or hemoptysis; No shortness of breath  CARDIOVASCULAR: No chest pain, palpitations, dizziness, or leg swelling  GASTROINTESTINAL: No abdominal or epigastric pain. No nausea, vomiting, or hematemesis; No diarrhea or constipation. No melena or hematochezia.  GENITOURINARY: No dysuria, frequency, hematuria, or incontinence  NEUROLOGICAL: No headaches, memory loss, loss of strength, numbness, or tremors      Consultant(s) Notes Reviewed:  [x ] YES  [ ] NO    PHYSICAL EXAM:  GENERAL: NAD, well-groomed, well-developed,not in any distress ,  HEAD:  Atraumatic, Normocephalic  EYES: EOMI, PERRLA, conjunctiva and sclera clear  ENMT: No tonsillar erythema, exudates, or enlargement; Moist mucous membranes, Good dentition, No lesions  NECK: Supple, No JVD, Normal thyroid  NERVOUS SYSTEM:  Alert & Oriented X3, No focal deficit   CHEST/LUNG: Good air entry bilateral with no  rales, rhonchi, wheezing, or rubs  HEART: Regular rate and rhythm; No murmurs, rubs, or gallops  ABDOMEN: Soft, Nontender, Nondistended; Bowel sounds present  EXTREMITIES:  2+ Peripheral Pulses, No clubbing, cyanosis, or edema      Care Discussed with Consultants/Other Providers [ x] YES  [ ] NO
[facility] NEPHROLOGY- PROGRESS NOTE    69y Male with history of long standing HTN and DM presents with SOB. Nephrology consulted for elevated Scr.    REVIEW OF SYSTEMS:  Gen: no fevers  Cards: no chest pain  Resp: no dyspnea  GI: no nausea or vomiting or diarrhea  Vascular: no LE edema    No Known Allergies      Hospital Medications: Medications reviewed      VITALS:  T(F): 98 (09-20-24 @ 12:10), Max: 98.7 (09-20-24 @ 05:30)  HR: 76 (09-20-24 @ 12:30)  BP: 137/80 (09-20-24 @ 12:30)  RR: 19 (09-20-24 @ 12:30)  SpO2: 98% (09-20-24 @ 12:30)  Wt(kg): --      PHYSICAL EXAM:    Gen: NAD, calm  Cards: RRR, +S1/S2, no M/G/R  Resp: CTA B/L  GI: soft, NT/ND, NABS  Vascular: no LE edema B/L      LABS:  09-20    143  |  107  |  28[H]  ----------------------------<  194[H]  4.0   |  23  |  1.61[H]    Ca    9.5      20 Sep 2024 05:50  Phos  2.9     09-20  Mg     2.30     09-20      Creatinine Trend: 1.61 <--, 1.59 <--, 1.54 <--, 1.64 <--                        14.4   6.80  )-----------( 177      ( 20 Sep 2024 05:50 )             44.5     Urine Studies:  Urinalysis Basic - ( 20 Sep 2024 05:50 )    Color:  / Appearance:  / SG:  / pH:   Gluc: 194 mg/dL / Ketone:   / Bili:  / Urobili:    Blood:  / Protein:  / Nitrite:    Leuk Esterase:  / RBC:  / WBC    Sq Epi:  / Non Sq Epi:  / Bacteria:                 RADIOLOGY & ADDITIONAL STUDIES:
[facility] NEPHROLOGY- PROGRESS NOTE    69y Male with history of long standing HTN and DM presents with SOB. Nephrology consulted for elevated Scr.    REVIEW OF SYSTEMS:  Gen: no fevers  Cards: no chest pain  Resp: no dyspnea  GI: no nausea or vomiting or diarrhea  Vascular: no LE edema    No Known Allergies      Hospital Medications: Medications reviewed      VITALS:  T(F): 98.1 (09-21-24 @ 09:09), Max: 98.4 (09-20-24 @ 21:30)  HR: 74 (09-21-24 @ 12:17)  BP: 132/83 (09-21-24 @ 12:17)  RR: 19 (09-21-24 @ 12:17)  SpO2: 96% (09-21-24 @ 12:17)  Wt(kg): --    09-20 @ 07:01  -  09-21 @ 07:00  --------------------------------------------------------  IN: 50 mL / OUT: 0 mL / NET: 50 mL        PHYSICAL EXAM:    Gen: NAD, calm  Cards: RRR, +S1/S2, no M/G/R  Resp: CTA B/L  GI: soft, NT/ND, NABS  Vascular: no LE edema B/L      LABS:  09-21    141  |  106  |  26[H]  ----------------------------<  174[H]  4.0   |  21[L]  |  1.40[H]    Ca    8.3[L]      21 Sep 2024 05:52  Phos  3.0     09-21  Mg     2.20     09-21      Creatinine Trend: 1.40 <--, 1.61 <--, 1.59 <--, 1.54 <--, 1.64 <--                        14.0   6.80  )-----------( 165      ( 21 Sep 2024 05:52 )             43.2     Urine Studies:  Urinalysis Basic - ( 21 Sep 2024 05:52 )    Color:  / Appearance:  / SG:  / pH:   Gluc: 174 mg/dL / Ketone:   / Bili:  / Urobili:    Blood:  / Protein:  / Nitrite:    Leuk Esterase:  / RBC:  / WBC    Sq Epi:  / Non Sq Epi:  / Bacteria:         
[facility] NEPHROLOGY- PROGRESS NOTE    69y Male with history of long standing HTN and DM presents with SOB. Nephrology consulted for elevated Scr.    REVIEW OF SYSTEMS:  Gen: no fevers  Cards: no chest pain  Resp: no dyspnea  GI: no nausea or vomiting or diarrhea  Vascular: no LE edema    No Known Allergies      Hospital Medications: Medications reviewed        VITALS:  T(F): 97.6 (09-24-24 @ 12:46), Max: 98.3 (09-24-24 @ 06:45)  HR: 90 (09-24-24 @ 12:46)  BP: 137/83 (09-24-24 @ 12:46)  RR: 18 (09-24-24 @ 12:46)  SpO2: 96% (09-24-24 @ 12:46)  Wt(kg): --      PHYSICAL EXAM:    Gen: NAD, calm  Cards: RRR, +S1/S2, no M/G/R  Resp: CTA B/L  GI: soft, NT/ND, NABS  Vascular: no LE edema B/L        LABS:  09-24    141  |  108[H]  |  29[H]  ----------------------------<  163[H]  4.1   |  21[L]  |  1.45[H]    Ca    9.3      24 Sep 2024 06:23  Phos  3.2     09-23  Mg     2.40     09-23    TPro  7.0  /  Alb  3.8  /  TBili  0.3  /  DBili      /  AST  42[H]  /  ALT  46[H]  /  AlkPhos  98  09-24    Creatinine Trend: 1.45 <--, 1.53 <--, 1.48 <--, 1.40 <--, 1.61 <--, 1.59 <--, 1.54 <--                        13.7   7.53  )-----------( 178      ( 24 Sep 2024 06:23 )             41.6     Urine Studies:  Urinalysis Basic - ( 24 Sep 2024 06:23 )    Color:  / Appearance:  / SG:  / pH:   Gluc: 163 mg/dL / Ketone:   / Bili:  / Urobili:    Blood:  / Protein:  / Nitrite:    Leuk Esterase:  / RBC:  / WBC    Sq Epi:  / Non Sq Epi:  / Bacteria:                   
CARDIOLOGY FOLLOW UP - Dr. South  Date of Service: 09-22-24 @ 10:26    CC: no cp/sob    Review of Systems:  Constitutional: No fever, weight loss, or fatigue  Respiratory: No cough, wheezing, or hemoptysis, no shortness of breath  Cardiovascular: No chest pain, palpitations, passing out, dizziness, or leg swelling  Gastrointestinal: No abd or epigastric pain. No nausea, vomiting, or hematemesis; no diarrhea or consiptaiton, no melena or hematochezia  Vascular: No edema     TELEMETRY:    PHYSICAL EXAM:  T(C): 36.8 (09-22-24 @ 06:54), Max: 37 (09-21-24 @ 21:27)  HR: 76 (09-22-24 @ 06:54) (72 - 76)  BP: 154/74 (09-22-24 @ 06:54) (122/70 - 154/74)  RR: 18 (09-22-24 @ 06:54) (18 - 19)  SpO2: 96% (09-22-24 @ 06:54) (95% - 97%)  Wt(kg): --  I&O's Summary    21 Sep 2024 07:01  -  22 Sep 2024 07:00  --------------------------------------------------------  IN: 0 mL / OUT: 500 mL / NET: -500 mL        Appearance: Normal	  Cardiovascular: Normal S1 S2,RRR, No JVD, No murmurs  Respiratory: Lungs clear to auscultation	  Gastrointestinal:  Soft, Non-tender, + BS	  Extremities: Normal range of motion, No clubbing, cyanosis or edema  Vascular: Peripheral pulses palpable 2+ bilaterally       Home Medications:  aspirin 81 mg oral tablet: 1 tab(s) orally once a day (02 Apr 2020 00:45)  clonidine 0.1 mg oral tablet: 2 tab(s) orally 2 times a day (02 Apr 2020 00:45)  doxazosin 2 mg oral tablet: 1 tab(s) orally once a day (02 Apr 2020 00:45)  labetalol 100 mg oral tablet: 3 tab(s) orally 2 times a day (02 Apr 2020 00:45)  Spiriva 18 mcg inhalation capsule: 1 each inhaled once a day (02 Apr 2020 00:45)  Symbicort 160 mcg-4.5 mcg/inh inhalation aerosol: 2 puff(s) inhaled 2 times a day (02 Apr 2020 00:45)        MEDICATIONS  (STANDING):  albuterol    90 MICROgram(s) HFA Inhaler 2 Puff(s) Inhalation every 6 hours  amLODIPine   Tablet 10 milliGRAM(s) Oral daily  aspirin enteric coated 81 milliGRAM(s) Oral daily  cloNIDine 0.2 milliGRAM(s) Oral two times a day  clopidogrel Tablet 75 milliGRAM(s) Oral daily  dextrose 5%. 1000 milliLiter(s) (50 mL/Hr) IV Continuous <Continuous>  dextrose 5%. 1000 milliLiter(s) (100 mL/Hr) IV Continuous <Continuous>  dextrose 50% Injectable 25 Gram(s) IV Push once  dextrose 50% Injectable 12.5 Gram(s) IV Push once  dextrose 50% Injectable 25 Gram(s) IV Push once  doxazosin 2 milliGRAM(s) Oral at bedtime  glucagon  Injectable 1 milliGRAM(s) IntraMuscular once  heparin   Injectable 5000 Unit(s) SubCutaneous every 12 hours  hydrALAZINE 25 milliGRAM(s) Oral two times a day  insulin lispro (ADMELOG) corrective regimen sliding scale   SubCutaneous three times a day before meals  insulin lispro (ADMELOG) corrective regimen sliding scale   SubCutaneous at bedtime  isosorbide   mononitrate ER Tablet (IMDUR) 30 milliGRAM(s) Oral daily  labetalol 300 milliGRAM(s) Oral two times a day  tiotropium 2.5 MICROgram(s) Inhaler 2 Puff(s) Inhalation daily        EKG:  RADIOLOGY:  DIAGNOSTIC TESTING:  [ ] Echocardiogram:  [ ] Catherterization:  [ ] Stress Test:  OTHER:     LABS:	 	                          14.0   6.80  )-----------( 165      ( 21 Sep 2024 05:52 )             43.2     09-21    141  |  106  |  26[H]  ----------------------------<  174[H]  4.0   |  21[L]  |  1.40[H]    Ca    8.3[L]      21 Sep 2024 05:52  Phos  3.0     09-21  Mg     2.20     09-21        PTT - ( 21 Sep 2024 05:52 )  PTT:30.8 sec    CARDIAC MARKERS:                  
Date of Service  : 09-23-24     INTERVAL HPI/OVERNIGHT EVENTS:I feel better .  Vital Signs Last 24 Hrs  T(C): 36.8 (23 Sep 2024 12:20), Max: 36.8 (23 Sep 2024 01:44)  T(F): 98.3 (23 Sep 2024 12:20), Max: 98.3 (23 Sep 2024 01:44)  HR: 80 (23 Sep 2024 12:20) (67 - 84)  BP: 150/66 (23 Sep 2024 12:20) (115/72 - 163/90)  BP(mean): --  RR: 19 (23 Sep 2024 12:20) (11 - 24)  SpO2: 93% (23 Sep 2024 12:20) (93% - 100%)    Parameters below as of 23 Sep 2024 09:15  Patient On (Oxygen Delivery Method): nasal cannula  O2 Flow (L/min): 2    I&O's Summary    22 Sep 2024 07:01  -  23 Sep 2024 07:00  --------------------------------------------------------  IN: 0 mL / OUT: 1550 mL / NET: -1550 mL      MEDICATIONS  (STANDING):  acetylcysteine  Oral Solution 1200 milliGRAM(s) Oral every 12 hours  albuterol    90 MICROgram(s) HFA Inhaler 2 Puff(s) Inhalation every 6 hours  amLODIPine   Tablet 10 milliGRAM(s) Oral daily  aspirin enteric coated 81 milliGRAM(s) Oral daily  cloNIDine 0.2 milliGRAM(s) Oral two times a day  clopidogrel Tablet 75 milliGRAM(s) Oral daily  dextrose 5%. 1000 milliLiter(s) (50 mL/Hr) IV Continuous <Continuous>  dextrose 5%. 1000 milliLiter(s) (100 mL/Hr) IV Continuous <Continuous>  dextrose 50% Injectable 25 Gram(s) IV Push once  dextrose 50% Injectable 12.5 Gram(s) IV Push once  dextrose 50% Injectable 25 Gram(s) IV Push once  doxazosin 2 milliGRAM(s) Oral at bedtime  glucagon  Injectable 1 milliGRAM(s) IntraMuscular once  hydrALAZINE 25 milliGRAM(s) Oral two times a day  insulin lispro (ADMELOG) corrective regimen sliding scale   SubCutaneous at bedtime  insulin lispro (ADMELOG) corrective regimen sliding scale   SubCutaneous three times a day before meals  isosorbide   mononitrate ER Tablet (IMDUR) 30 milliGRAM(s) Oral daily  labetalol 300 milliGRAM(s) Oral two times a day  tiotropium 2.5 MICROgram(s) Inhaler 2 Puff(s) Inhalation daily    MEDICATIONS  (PRN):  dextrose Oral Gel 15 Gram(s) Oral once PRN Blood Glucose LESS THAN 70 milliGRAM(s)/deciliter    LABS:                        13.9   6.74  )-----------( 170      ( 23 Sep 2024 06:55 )             43.0     09-23    142  |  105  |  31[H]  ----------------------------<  169[H]  4.3   |  22  |  1.53[H]    Ca    9.4      23 Sep 2024 06:55  Phos  3.2     09-23  Mg     2.40     09-23      PT/INR - ( 23 Sep 2024 06:55 )   PT: 11.2 sec;   INR: 1.00 ratio         PTT - ( 23 Sep 2024 06:55 )  PTT:32.4 sec  Urinalysis Basic - ( 23 Sep 2024 06:55 )    Color: x / Appearance: x / SG: x / pH: x  Gluc: 169 mg/dL / Ketone: x  / Bili: x / Urobili: x   Blood: x / Protein: x / Nitrite: x   Leuk Esterase: x / RBC: x / WBC x   Sq Epi: x / Non Sq Epi: x / Bacteria: x      CAPILLARY BLOOD GLUCOSE      POCT Blood Glucose.: 156 mg/dL (23 Sep 2024 11:56)  POCT Blood Glucose.: 208 mg/dL (23 Sep 2024 07:33)  POCT Blood Glucose.: 189 mg/dL (22 Sep 2024 22:16)  POCT Blood Glucose.: 228 mg/dL (22 Sep 2024 21:48)  POCT Blood Glucose.: 109 mg/dL (22 Sep 2024 17:04)        Urinalysis Basic - ( 23 Sep 2024 06:55 )    Color: x / Appearance: x / SG: x / pH: x  Gluc: 169 mg/dL / Ketone: x  / Bili: x / Urobili: x   Blood: x / Protein: x / Nitrite: x   Leuk Esterase: x / RBC: x / WBC x   Sq Epi: x / Non Sq Epi: x / Bacteria: x      REVIEW OF SYSTEMS:  CONSTITUTIONAL: No fever, weight loss, or fatigue  EYES: No eye pain, visual disturbances, or discharge  ENMT:  No difficulty hearing, tinnitus, vertigo; No sinus or throat pain  NECK: No pain or stiffness  RESPIRATORY: No cough, wheezing, chills or hemoptysis; No shortness of breath  CARDIOVASCULAR: No chest pain, palpitations, dizziness, or leg swelling  GASTROINTESTINAL: No abdominal or epigastric pain. No nausea, vomiting, or hematemesis; No diarrhea or constipation. No melena or hematochezia.  GENITOURINARY: No dysuria, frequency, hematuria, or incontinence  NEUROLOGICAL: No headaches, memory loss, loss of strength, numbness, or tremors    Consultant(s) Notes Reviewed:  [x ] YES  [ ] NO    PHYSICAL EXAM:  GENERAL: NAD, well-groomed, well-developed,not in any distress ,  HEAD:  Atraumatic, Normocephalic  NECK: Supple, No JVD, Normal thyroid  NERVOUS SYSTEM:  Alert & Oriented X3, No focal deficit   CHEST/LUNG: Good air entry bilateral with no  rales, rhonchi, wheezing, or rubs  HEART: Regular rate and rhythm; No murmurs, rubs, or gallops  ABDOMEN: Soft, Nontender, Nondistended; Bowel sounds present  EXTREMITIES:  2+ Peripheral Pulses, No clubbing, cyanosis, or edema    Care Discussed with Consultants/Other Providers [ x] YES  [ ] NO
[facility] NEPHROLOGY- PROGRESS NOTE    69y Male with history of long standing HTN and DM presents with SOB. Nephrology consulted for elevated Scr.    REVIEW OF SYSTEMS:  Gen: no fevers  Cards: no chest pain  Resp: no dyspnea  GI: no nausea or vomiting or diarrhea  Vascular: no LE edema    No Known Allergies      Hospital Medications: Medications reviewed        VITALS:  T(F): 97.6 (09-23-24 @ 09:15), Max: 98.3 (09-23-24 @ 01:44)  HR: 79 (09-23-24 @ 11:53)  BP: 144/76 (09-23-24 @ 11:53)  RR: 14 (09-23-24 @ 09:15)  SpO2: 94% (09-23-24 @ 09:15)  Wt(kg): --    09-22 @ 07:01  -  09-23 @ 07:00  --------------------------------------------------------  IN: 0 mL / OUT: 1550 mL / NET: -1550 mL        PHYSICAL EXAM:    Gen: NAD, calm  Cards: RRR, +S1/S2, no M/G/R  Resp: CTA B/L  GI: soft, NT/ND, NABS  Vascular: no LE edema B/L        LABS:  09-23    142  |  105  |  31[H]  ----------------------------<  169[H]  4.3   |  22  |  1.53[H]    Ca    9.4      23 Sep 2024 06:55  Phos  3.2     09-23  Mg     2.40     09-23      Creatinine Trend: 1.53 <--, 1.48 <--, 1.40 <--, 1.61 <--, 1.59 <--, 1.54 <--, 1.64 <--                        13.9   6.74  )-----------( 170      ( 23 Sep 2024 06:55 )             43.0     Urine Studies:  Urinalysis Basic - ( 23 Sep 2024 06:55 )    Color:  / Appearance:  / SG:  / pH:   Gluc: 169 mg/dL / Ketone:   / Bili:  / Urobili:    Blood:  / Protein:  / Nitrite:    Leuk Esterase:  / RBC:  / WBC    Sq Epi:  / Non Sq Epi:  / Bacteria:

## 2024-09-24 NOTE — DIETITIAN INITIAL EVALUATION ADULT - ORAL INTAKE PTA/DIET HISTORY
Patient reports his appetite has been always good, consumes 3 meals daily. Pt endorses followed " low sodium and low sugar diet" at home. Reports do not put salt in his foods. However, pt reports drinks orange juice twice weekly. Pt has been checking his FS daily with reading from 115-125 with insulin in use.

## 2024-09-24 NOTE — DIETITIAN INITIAL EVALUATION ADULT - PERTINENT LABORATORY DATA
09-24    141  |  108[H]  |  29[H]  ----------------------------<  163[H]  4.1   |  21[L]  |  1.45[H]    Ca    9.3      24 Sep 2024 06:23  Phos  3.2     09-23  Mg     2.40     09-23    TPro  7.0  /  Alb  3.8  /  TBili  0.3  /  DBili  x   /  AST  42[H]  /  ALT  46[H]  /  AlkPhos  98  09-24  POCT Blood Glucose.: 206 mg/dL (09-24-24 @ 07:41)  A1C with Estimated Average Glucose Result: 7.6 % (09-18-24 @ 05:55)

## 2024-09-24 NOTE — DIETITIAN INITIAL EVALUATION ADULT - PERTINENT MEDS FT
MEDICATIONS  (STANDING):  albuterol    90 MICROgram(s) HFA Inhaler 2 Puff(s) Inhalation every 6 hours  amLODIPine   Tablet 10 milliGRAM(s) Oral daily  aspirin enteric coated 325 milliGRAM(s) Oral daily  cloNIDine 0.2 milliGRAM(s) Oral two times a day  clopidogrel Tablet 75 milliGRAM(s) Oral daily  dextrose 5%. 1000 milliLiter(s) (100 mL/Hr) IV Continuous <Continuous>  dextrose 5%. 1000 milliLiter(s) (50 mL/Hr) IV Continuous <Continuous>  dextrose 50% Injectable 12.5 Gram(s) IV Push once  dextrose 50% Injectable 25 Gram(s) IV Push once  dextrose 50% Injectable 25 Gram(s) IV Push once  doxazosin 2 milliGRAM(s) Oral at bedtime  glucagon  Injectable 1 milliGRAM(s) IntraMuscular once  hydrALAZINE 25 milliGRAM(s) Oral two times a day  insulin lispro (ADMELOG) corrective regimen sliding scale   SubCutaneous at bedtime  insulin lispro (ADMELOG) corrective regimen sliding scale   SubCutaneous three times a day before meals  isosorbide   mononitrate ER Tablet (IMDUR) 30 milliGRAM(s) Oral daily  labetalol 300 milliGRAM(s) Oral two times a day  tiotropium 2.5 MICROgram(s) Inhaler 2 Puff(s) Inhalation daily    MEDICATIONS  (PRN):  dextrose Oral Gel 15 Gram(s) Oral once PRN Blood Glucose LESS THAN 70 milliGRAM(s)/deciliter

## 2024-09-24 NOTE — DISCHARGE NOTE PROVIDER - NSDCCPCAREPLAN_GEN_ALL_CORE_FT
PRINCIPAL DISCHARGE DIAGNOSIS  Diagnosis: Shortness of breath  Assessment and Plan of Treatment: You were admitted for shortness of breath, likely due to acute on chronic heart failure. Echocardiogram was unremarkable. Cardiac enzymes were negative. EKG unremarkable. Chest x ray showed mild pulmonary edema. You had a heart catheterization on 9/17 that showed moderate-severe stenoses of two blood vessels of your heart. You had 4 stents placed. You are to continue on lasix on discharge. Please follow up with your primary care physician and cardiologist after discharge for continued monitoring and management.     PRINCIPAL DISCHARGE DIAGNOSIS  Diagnosis: Shortness of breath  Assessment and Plan of Treatment: You were admitted for shortness of breath, likely due to acute on chronic heart failure. Echocardiogram was unremarkable. Cardiac enzymes were negative. EKG unremarkable. Chest x ray showed mild pulmonary edema. You had a heart catheterization on 9/17 that showed moderate-severe stenoses of two blood vessels of your heart. You had 4 stents placed. You are to continue on lasix and plavix on discharge. Continue on aspirin 325 mg once a day for a month and then take aspirin 81 mg thereafter. Please follow up with your primary care physician and cardiologist after discharge for continued monitoring and management.

## 2024-09-24 NOTE — DIETITIAN INITIAL EVALUATION ADULT - ADD RECOMMEND
1) Recommend add Consistent Carbohydrate diet for glycemic control.   2) Monitor PO intake, Labs, weights, BMs, and skin integrity.   3) RD to remain available for further nutritional interventions as indicated.

## 2024-09-24 NOTE — DISCHARGE NOTE PROVIDER - CARE PROVIDERS DIRECT ADDRESSES
[email or web address]

## 2024-09-24 NOTE — DIETITIAN INITIAL EVALUATION ADULT - NS FNS WEIGHT CHANGE REASON
Pt with adm weight 253.1lbs (9/17), and most recent weight per RN flow sheet 242.5lbs ( 9/21). Weight trend reflects weight loss [id number] x 4 days. Pt dx with CHF s/p IV Lasix which can contribute to weight loss due to fluid loss./unintentional

## 2024-09-24 NOTE — DISCHARGE NOTE NURSING/CASE MANAGEMENT/SOCIAL WORK - NSPROMEDSBROUGHTTOHOSP_GEN_A_NUR
Physical Therapy Treatment    Patient Name: Karel Jones  MRN: [id number]  Today's Date: 6/25/2024  Time Calculation  Start Time: 1130  Stop Time: 1211  Time Calculation (min): 41 min  PT Therapeutic Procedures Time Entry  Neuromuscular Re-Education Time Entry: 11  Therapeutic Exercise Time Entry: 29       Current Problem  1. Acute pain of right knee        2. Sprain of anterior cruciate ligament of right knee, subsequent encounter  Follow Up In Physical Therapy          Insurance:  Number of Treatments Authorized: 21/30 (update at 15)  Certification Period Start Date: 03/18/24  Certification Period End Date: 08/16/24    Subjective   General  Reason for Referral: R knee pain s/p ACL repair with BTB graft, meniscal repair and LET on 3/14/24  Referred By: Dr Potter  General Comment: Pt reports he's doing well and without pain. Denies soreness after last visit.    Performing HEP?: Yes    Precautions  Precautions  Precautions Comment: ACL protocol  Pain  Pain Assessment: 0-10  0-10 (Numeric) Pain Score: 0 - No pain  Pain Location: Knee  Pain Orientation: Right    Objective    Slight R knee valgus with split squats - improving with cues   Mild antalgia with jogging, improving with cues and increased pace      Treatments:  Therapeutic Exercise  Therapeutic Exercise Activity 1: SportsArt L3 x 5 minutes  Therapeutic Exercise Activity 2: Dynamics: knee hugs, quad pulls, tin soldiers, toe swipes, hip openers, hip closers, x 40' each  Therapeutic Exercise Activity 3: Jogging in hallway 50' x 6  Therapeutic Exercise Activity 4: R/L side shuffles x 50' each  Therapeutic Exercise Activity 5: R/L carioca shuffle x 50' each  Therapeutic Exercise Activity 6: Jogging fwd 50' x 3, bkwds x 50' (cues for light to mod jog)  Therapeutic Exercise Activity 7: Gastroc stretch slantboard x 1 min  Therapeutic Exercise Activity 8: R,L SL heel raises 10 reps x 2  Therapeutic Exercise Activity 9: R,L lateral lunges 10# x 45 sec each  Therapeutic  "Exercise Activity 10: Squats with bar 15 reps, x 10 reps  Therapeutic Exercise Activity 11: R,L Korean squats 10 reps x 2 each (UE support needed for R LE behind)  Therapeutic Exercise Activity 12: Knee ext machine: 80# B x 12 reps; R,L SL 40# x 12 reps, repeat x 8 reps  Therapeutic Exercise Activity 13: R,L stool scoots 40' x 4; F/B x 40' each    Balance/Neuromuscular Re-Education  Balance/Neuromuscular Re-Education Activity 1: R SLS with 4# med ball taps to rail x 1 min  Balance/Neuromuscular Re-Education Activity 2: R,L SLS KB passes 10# x 30\" each  Balance/Neuromuscular Re-Education Activity 3: R,L SL RDLs 10# x 30\" each  Balance/Neuromuscular Re-Education Activity 4: Bunny hops x20, F/B x 20, side to side x 20; repeat x 2    OP EDUCATION:  Outpatient Education  Education Comment: SL squats, lunges, heel raises for HEP; loaded knee flexion (quad stretch)/ext    Assessment:  PT Assessment  Assessment Comment: Progressing well with plyos and strengthening this date. Challenged with SL RDLs and quad ext machine but without an increase in pain. Fatiguing throughout tx session. Coaxed for increasing strength regimen at home wiht good understanding.    Plan:  OP PT Plan  Treatment/Interventions: Cryotherapy, Dry needling, Education/ Instruction, Electrical stimulation, Gait training, Hot pack, Manual therapy, Neuromuscular re-education, Self care/ home management, Taping techniques, Therapeutic activities, Therapeutic exercises, Vasopneumatic device  PT Plan: Skilled PT (Quad extension machine, strengthening with SL squats, squat variations and lunges, resistance band work; plyos/jogging)  PT Frequency: 1 time per week (1x/week starting in june)  Duration: 18-20  Onset Date: 01/16/24 (surgery 3/14/24)  Certification Period Start Date: 03/18/24  Certification Period End Date: 08/16/24  Number of Treatments Authorized: 21/30 (update at 15)  Rehab Potential: Good  Plan of Care Agreement: Patient    Goals:  Active  "      R knee pain        STG/LTG (Progressing)       Start:  24    Expected End:  24       ST) Patient will improve LEFS score by 9 points in order to perform functional activities at home and in the community in 4 weeks. MET  2) Patient will be able to complete ADLs with pain in knee less than 4/10 in 4 weeks. MET  3) Pt will improve knee AROM to 0-90 degrees to be able to complete ADLs with less difficulty in 4 weeks. MET  4) Patient will be independent with HEP to allow for continued improvement in daily tasks at home and in the community in 3 visits.    5) Pt will ambulate without crutches with normal gait pattern in 7 weeks pending MD clearance. MET  LT) Patient will have >/=5/5 strength in hip musculature to aid in balance with ambulation on varied surfaces in community in 12 weeks-  in progress  2) Patient will be able to perform proper squatting technique in order to reduce compression on knee and prevent increased pain with daily tasks in 8 weeks. - PART MET   3) Patient will be able to perform >60 seconds of SLS on even ground in order to allow for safe ambulation on all levels and to reduce fall risk within the community in 8 weeks. - IN PROGRESS  4) Patient will improve LEFS score >/=70/80 points in order to perform functional activities at home and in the community by discharge. IN PROGRESS  5) Pt will return to basketball safely while passing all hop testing by discharge. - IN PROGRESS              Barbara Fowler, PT   no

## 2024-09-24 NOTE — DISCHARGE NOTE PROVIDER - PROVIDER TOKENS
PROVIDER:[TOKEN:[[id number]]],PROVIDER:[TOKEN:[[id number]]] PROVIDER:[TOKEN:[[id number]]],PROVIDER:[TOKEN:[[id number]],SCHEDULEDAPPT:[10/02/2024],SCHEDULEDAPPTTIME:[12:30 PM]]

## 2024-09-24 NOTE — PROGRESS NOTE ADULT - PROVIDER SPECIALTY LIST ADULT
Cardiology
Cardiology
Internal Medicine
Internal Medicine
Nephrology
Anesthesia
Cardiology
Cardiology
Internal Medicine
Nephrology
Anesthesia
Cardiology
Cardiology
Internal Medicine
Internal Medicine
Cardiology
Internal Medicine
Internal Medicine
Nephrology

## 2024-09-24 NOTE — DISCHARGE NOTE NURSING/CASE MANAGEMENT/SOCIAL WORK - NSDCVIVACCINE_GEN_ALL_CORE_FT
pneumococcal polysaccharide PPV23; 14-Dec-2013 15:56; Radha Mercado (RN); [id number]; IntraMuscular; Deltoid Right.; 0.5 milliLiter(s);

## 2024-09-24 NOTE — DISCHARGE NOTE NURSING/CASE MANAGEMENT/SOCIAL WORK - NSDCPEFALRISK_GEN_ALL_CORE
For information on Fall & Injury Prevention, visit: [email or web address] OR  [email or web address] OR  https://www.cdc.gov/steadi/patient.html

## 2024-09-24 NOTE — DISCHARGE NOTE PROVIDER - CARE PROVIDER_API CALL
Felicia Perdomo  Nephrology  [address] [place] [address]  Phone: [phone]  Fax: [phone]  Follow Up Time:     [facility]  Cardiovascular Disease  [address]  [place] [address]  Phone: [phone]  Fax: [phone]  Follow Up Time:    Felicia Perdomo  Nephrology  [address] [place] [address]  Phone: [phone]  Fax: [phone]  Follow Up Time:     [facility]  Cardiovascular Disease  [address], [address]  [place] [address]  Phone: [phone]  Fax: [phone]  Scheduled Appointment: 10/02/2024 12:30 PM

## 2024-09-24 NOTE — DISCHARGE NOTE PROVIDER - NSDCMRMEDTOKEN_GEN_ALL_CORE_FT
albuterol 90 mcg/inh inhalation aerosol: 2 puff(s) inhaled every 6 hours  amLODIPine 10 mg oral tablet: 1 tab(s) orally once a day   aspirin 81 mg oral tablet: 1 tab(s) orally once a day  clonidine 0.1 mg oral tablet: 2 tab(s) orally 2 times a day  doxazosin 2 mg oral tablet: 1 tab(s) orally once a day  hydrALAZINE 25 mg oral tablet: 1 tab(s) orally 2 times a day  labetalol 100 mg oral tablet: 3 tab(s) orally 2 times a day  lidocaine 5% topical film: Apply topically to affected area once a day MDD:1  Spiriva 18 mcg inhalation capsule: 1 each inhaled once a day  Symbicort 160 mcg-4.5 mcg/inh inhalation aerosol: 2 puff(s) inhaled 2 times a day  Toujeo SoloStar 300 units/mL subcutaneous solution: 18 unit(s) subcutaneous once a day   Valium 5 mg oral tablet: 1 tab(s) orally every 12 hours MDD:2   albuterol 90 mcg/inh inhalation aerosol: 2 puff(s) inhaled every 6 hours  amLODIPine 10 mg oral tablet: 1 tab(s) orally once a day   aspirin 325 mg oral delayed release tablet: 1 tab(s) orally once a day Please stop after 1 month and then take aspirin 81 mg thereafter  clonidine 0.1 mg oral tablet: 2 tab(s) orally 2 times a day  clopidogrel 75 mg oral tablet: 1 tab(s) orally once a day  doxazosin 2 mg oral tablet: 1 tab(s) orally once a day  hydrALAZINE 25 mg oral tablet: 1 tab(s) orally 2 times a day  isosorbide mononitrate 30 mg oral tablet, extended release: 1 tab(s) orally once a day  labetalol 100 mg oral tablet: 3 tab(s) orally 2 times a day  lidocaine 5% topical film: Apply topically to affected area once a day MDD:1  Spiriva 18 mcg inhalation capsule: 1 each inhaled once a day  Symbicort 160 mcg-4.5 mcg/inh inhalation aerosol: 2 puff(s) inhaled 2 times a day  Toujeo SoloStar 300 units/mL subcutaneous solution: 18 unit(s) subcutaneous once a day    albuterol 90 mcg/inh inhalation aerosol: 2 puff(s) inhaled every 6 hours  amLODIPine 10 mg oral tablet: 1 tab(s) orally once a day   aspirin 325 mg oral delayed release tablet: 1 tab(s) orally once a day Please stop after 1 month and then take aspirin 81 mg thereafter  clonidine 0.1 mg oral tablet: 2 tab(s) orally 2 times a day  clopidogrel 75 mg oral tablet: 1 tab(s) orally once a day  doxazosin 2 mg oral tablet: 1 tab(s) orally once a day  hydrALAZINE 25 mg oral tablet: 1 tab(s) orally 2 times a day  isosorbide mononitrate 30 mg oral tablet, extended release: 1 tab(s) orally once a day  labetalol 100 mg oral tablet: 3 tab(s) orally 2 times a day  Lasix 40 mg oral tablet: 1 tab(s) orally once a day  lidocaine 5% topical film: Apply topically to affected area once a day MDD:1  Spiriva 18 mcg inhalation capsule: 1 each inhaled once a day  Symbicort 160 mcg-4.5 mcg/inh inhalation aerosol: 2 puff(s) inhaled 2 times a day  Toujeo SoloStar 300 units/mL subcutaneous solution: 18 unit(s) subcutaneous once a day

## 2024-10-07 NOTE — ED ADULT NURSE NOTE - CAS EDN DISCHARGE ASSESSMENT
DISPLAY PLAN FREE TEXT Alert and oriented to person, place and time/Patient baseline mental status/Awake

## 2024-10-29 ENCOUNTER — APPOINTMENT (OUTPATIENT)
Dept: VASCULAR SURGERY | Facility: CLINIC | Age: 69
End: 2024-10-29
Payer: COMMERCIAL

## 2024-10-29 VITALS
SYSTOLIC BLOOD PRESSURE: 165 MMHG | DIASTOLIC BLOOD PRESSURE: 89 MMHG | WEIGHT: 252 LBS | HEART RATE: 81 BPM | BODY MASS INDEX: 33.4 KG/M2 | HEIGHT: 73 IN | TEMPERATURE: 97.8 F

## 2024-10-29 DIAGNOSIS — R25.2 CRAMP AND SPASM: ICD-10-CM

## 2024-10-29 DIAGNOSIS — I77.1 STRICTURE OF ARTERY: ICD-10-CM

## 2024-10-29 DIAGNOSIS — I73.9 PERIPHERAL VASCULAR DISEASE, UNSPECIFIED: ICD-10-CM

## 2024-10-29 DIAGNOSIS — G47.62 SLEEP RELATED LEG CRAMPS: ICD-10-CM

## 2024-10-29 PROCEDURE — 99214 OFFICE O/P EST MOD 30 MIN: CPT

## 2024-10-29 PROCEDURE — 93923 UPR/LXTR ART STDY 3+ LVLS: CPT

## 2024-10-29 PROCEDURE — ZZZZZ: CPT

## 2024-12-30 ENCOUNTER — EMERGENCY (EMERGENCY)
Facility: HOSPITAL | Age: 69
LOS: 1 days | Discharge: HOME | End: 2024-12-30
Attending: STUDENT IN AN ORGANIZED HEALTH CARE EDUCATION/TRAINING PROGRAM | Admitting: STUDENT IN AN ORGANIZED HEALTH CARE EDUCATION/TRAINING PROGRAM
Payer: COMMERCIAL

## 2024-12-30 VITALS
RESPIRATION RATE: 16 BRPM | HEIGHT: 73 IN | HEART RATE: 100 BPM | WEIGHT: 253.97 LBS | SYSTOLIC BLOOD PRESSURE: 174 MMHG | DIASTOLIC BLOOD PRESSURE: 90 MMHG | OXYGEN SATURATION: 99 % | TEMPERATURE: 98 F

## 2024-12-30 DIAGNOSIS — Z98.89 OTHER SPECIFIED POSTPROCEDURAL STATES: Chronic | ICD-10-CM

## 2024-12-30 LAB
ALBUMIN SERPL ELPH-MCNC: 3.4 G/DL — SIGNIFICANT CHANGE UP (ref 3.3–5)
ALP SERPL-CCNC: 82 U/L — SIGNIFICANT CHANGE UP (ref 40–120)
ALT FLD-CCNC: 11 U/L — SIGNIFICANT CHANGE UP (ref 4–41)
ANION GAP SERPL CALC-SCNC: 10 MMOL/L — SIGNIFICANT CHANGE UP (ref 7–14)
AST SERPL-CCNC: 14 U/L — SIGNIFICANT CHANGE UP (ref 4–40)
BASOPHILS # BLD AUTO: 0.03 K/UL — SIGNIFICANT CHANGE UP (ref 0–0.2)
BASOPHILS NFR BLD AUTO: 0.3 % — SIGNIFICANT CHANGE UP (ref 0–2)
BILIRUB SERPL-MCNC: 0.3 MG/DL — SIGNIFICANT CHANGE UP (ref 0.2–1.2)
BUN SERPL-MCNC: 16 MG/DL — SIGNIFICANT CHANGE UP (ref 7–23)
CALCIUM SERPL-MCNC: 9.2 MG/DL — SIGNIFICANT CHANGE UP (ref 8.4–10.5)
CHLORIDE SERPL-SCNC: 108 MMOL/L — HIGH (ref 98–107)
CO2 SERPL-SCNC: 27 MMOL/L — SIGNIFICANT CHANGE UP (ref 22–31)
CREAT SERPL-MCNC: 1.51 MG/DL — HIGH (ref 0.5–1.3)
D DIMER BLD IA.RAPID-MCNC: 542 NG/ML DDU — HIGH
EGFR: 50 ML/MIN/1.73M2 — LOW
EOSINOPHIL # BLD AUTO: 0.25 K/UL — SIGNIFICANT CHANGE UP (ref 0–0.5)
EOSINOPHIL NFR BLD AUTO: 2.8 % — SIGNIFICANT CHANGE UP (ref 0–6)
FLUAV AG NPH QL: SIGNIFICANT CHANGE UP
FLUBV AG NPH QL: SIGNIFICANT CHANGE UP
GLUCOSE SERPL-MCNC: 121 MG/DL — HIGH (ref 70–99)
HCT VFR BLD CALC: 41.8 % — SIGNIFICANT CHANGE UP (ref 39–50)
HGB BLD-MCNC: 13.9 G/DL — SIGNIFICANT CHANGE UP (ref 13–17)
IANC: 6.68 K/UL — SIGNIFICANT CHANGE UP (ref 1.8–7.4)
IMM GRANULOCYTES NFR BLD AUTO: 0.7 % — SIGNIFICANT CHANGE UP (ref 0–0.9)
LYMPHOCYTES # BLD AUTO: 1.37 K/UL — SIGNIFICANT CHANGE UP (ref 1–3.3)
LYMPHOCYTES # BLD AUTO: 15.3 % — SIGNIFICANT CHANGE UP (ref 13–44)
MCHC RBC-ENTMCNC: 28.8 PG — SIGNIFICANT CHANGE UP (ref 27–34)
MCHC RBC-ENTMCNC: 33.3 G/DL — SIGNIFICANT CHANGE UP (ref 32–36)
MCV RBC AUTO: 86.5 FL — SIGNIFICANT CHANGE UP (ref 80–100)
MONOCYTES # BLD AUTO: 0.58 K/UL — SIGNIFICANT CHANGE UP (ref 0–0.9)
MONOCYTES NFR BLD AUTO: 6.5 % — SIGNIFICANT CHANGE UP (ref 2–14)
NEUTROPHILS # BLD AUTO: 6.68 K/UL — SIGNIFICANT CHANGE UP (ref 1.8–7.4)
NEUTROPHILS NFR BLD AUTO: 74.4 % — SIGNIFICANT CHANGE UP (ref 43–77)
NRBC # BLD: 0 /100 WBCS — SIGNIFICANT CHANGE UP (ref 0–0)
NRBC # FLD: 0 K/UL — SIGNIFICANT CHANGE UP (ref 0–0)
PLATELET # BLD AUTO: 202 K/UL — SIGNIFICANT CHANGE UP (ref 150–400)
POTASSIUM SERPL-MCNC: 4.2 MMOL/L — SIGNIFICANT CHANGE UP (ref 3.5–5.3)
POTASSIUM SERPL-SCNC: 4.2 MMOL/L — SIGNIFICANT CHANGE UP (ref 3.5–5.3)
PROT SERPL-MCNC: 6.7 G/DL — SIGNIFICANT CHANGE UP (ref 6–8.3)
RBC # BLD: 4.83 M/UL — SIGNIFICANT CHANGE UP (ref 4.2–5.8)
RBC # FLD: 12.9 % — SIGNIFICANT CHANGE UP (ref 10.3–14.5)
RSV RNA NPH QL NAA+NON-PROBE: SIGNIFICANT CHANGE UP
SARS-COV-2 RNA SPEC QL NAA+PROBE: SIGNIFICANT CHANGE UP
SODIUM SERPL-SCNC: 145 MMOL/L — SIGNIFICANT CHANGE UP (ref 135–145)
WBC # BLD: 8.97 K/UL — SIGNIFICANT CHANGE UP (ref 3.8–10.5)
WBC # FLD AUTO: 8.97 K/UL — SIGNIFICANT CHANGE UP (ref 3.8–10.5)

## 2024-12-30 PROCEDURE — 99285 EMERGENCY DEPT VISIT HI MDM: CPT

## 2024-12-30 PROCEDURE — 71046 X-RAY EXAM CHEST 2 VIEWS: CPT | Mod: 26

## 2024-12-30 PROCEDURE — 71275 CT ANGIOGRAPHY CHEST: CPT | Mod: 26,MC

## 2024-12-30 PROCEDURE — 93010 ELECTROCARDIOGRAM REPORT: CPT

## 2024-12-30 RX ORDER — BENZONATATE 100 MG/1
100 CAPSULE ORAL ONCE
Refills: 0 | Status: COMPLETED | OUTPATIENT
Start: 2024-12-30 | End: 2024-12-30

## 2024-12-30 RX ORDER — BENZONATATE 100 MG/1
1 CAPSULE ORAL
Qty: 12 | Refills: 0
Start: 2024-12-30 | End: 2025-01-02

## 2024-12-30 RX ADMIN — BENZONATATE 100 MILLIGRAM(S): 100 CAPSULE ORAL at 11:49

## 2024-12-30 NOTE — ED PROVIDER NOTE - PATIENT PORTAL LINK FT
You can access the FollowMyHealth Patient Portal offered by [facility] by registering at the following website: [email or web address]. By joining [employer]’s FollowMyHealth portal, you will also be able to view your health information using other applications (apps) compatible with our system.

## 2024-12-30 NOTE — CONSULT NOTE ADULT - ASSESSMENT
A/P  69-year-old male with history of HTN, HLD, Aortic Regurgitation , Diastolic Heart Failure, CBD Stone s/p Stent, PAD, CKD, presenting with cough.  Patient states he came from [facility] December 21 and upon coming back developed a cough that has been bothering him.  Patient states the past Thursday and Friday had small amounts of bloody sputum at nighttime.  Patient denying weight loss    #cough, recent travel to [place], hemoptysis   -sx improved  -cta no pe, infitlrate, cav lesion  -hd stable  -bp elevated due to him not taking last night and am meds     stable for dcp  dispo per ed      60 minutes spent on total encounter; more than 50% of the visit was spent counseling and/or coordinating care by the attending physician.

## 2024-12-30 NOTE — ED ADULT TRIAGE NOTE - CHIEF COMPLAINT QUOTE
pt c/o productive cough x 1 week. reports coughing up blood tinged phlegm since friday. pt denies fever/chills. pt is hypertensive in triage. states he did not take his medications this morning. denies headache/change in vision/chest pain.

## 2024-12-30 NOTE — ED PROVIDER NOTE - CLINICAL SUMMARY MEDICAL DECISION MAKING FREE TEXT BOX
69-year-old male with history of HTN, HLD, Aortic Regurgitation , Diastolic Heart Failure, CBD Stone s/p Stent, PAD, CKD, presenting with cough. Ddx includes acute bronchitis likely. Low concern for tb given no weight loss, other infectious signs in the setting of recent travel. Will obtain cta to ro PE.

## 2024-12-30 NOTE — ED PROVIDER NOTE - NSFOLLOWUPINSTRUCTIONS_ED_ALL_ED_FT
Acute Cough    WHAT YOU NEED TO KNOW:    What is an acute cough? An acute cough can last up to 3 weeks. Common causes of an acute cough include a cold, allergies, or a lung infection.    How is the cause of an acute cough diagnosed? Your healthcare provider will examine you and listen to your lungs. Tell your healthcare provider if you cough up any mucus, or have a fever or shortness of breath. Also tell your provider what makes the cough better or worse. Depending on your symptoms, you may need a chest x-ray. A sample of mucus may be collected and tested for infection.    How is an acute cough treated? An acute cough usually goes away on its own. Ask your healthcare provider about medicines you can take to decrease your cough. You may need medicine to stop the cough, decrease swelling in your airways, or help open your airways. Medicine may also be given to help you cough up mucus. If you have an infection caused by bacteria, you may need antibiotics.    What can I do to manage my cough?    Do not smoke and stay away from others who smoke. Nicotine and other chemicals in cigarettes and cigars can cause lung damage and make your cough worse. Ask your healthcare provider for information if you currently smoke and need help to quit. E-cigarettes or smokeless tobacco still contain nicotine. Talk to your healthcare provider before you use these products.    Drink extra liquids as directed. Liquids will help thin and loosen mucus so you can cough it up. Liquids will also help prevent dehydration. Examples of good liquids to drink include water, fruit juice, and broth. Do not drink liquids that contain caffeine. Caffeine can increase your risk for dehydration. Ask your healthcare provider how much liquid to drink each day.    Rest as directed. Do not do activities that make your cough worse, such as exercise.    Use a humidifier or vaporizer. Use a cool mist humidifier or a vaporizer to increase air moisture in your home. This may make it easier for you to breathe and help decrease your cough.  Humidifier      Eat 2 to 5 mL of honey 2 times each day. Honey can help thin mucus and decrease your cough.    Use cough drops or lozenges. These can help decrease throat irritation and your cough.  When should I seek immediate care?    You have trouble breathing or feel short of breath.    You cough up blood, or you see blood in your mucus.    You faint or feel weak or dizzy.    You have chest pain when you cough or take a deep breath.    You have new wheezing.  When should I contact my healthcare provider?    You have a fever.    Your cough lasts longer than 4 weeks.    Your symptoms do not improve with treatment.    You have questions or concerns about your condition or care.

## 2024-12-30 NOTE — ED PROVIDER NOTE - OBJECTIVE STATEMENT
see mdm 69-year-old male with history of HTN, HLD, Aortic Regurgitation , Diastolic Heart Failure, CBD Stone s/p Stent, PAD, CKD, presenting with cough.  Patient states he came from [facility] December 21 and upon coming back developed a cough that has been bothering him.  Patient states the past Thursday and Friday had small amounts of bloody sputum at nighttime.  Patient denying weight loss, fevers, night sweats, nausea, vomiting, abdominal pain, urinary changes, stool changes.

## 2024-12-30 NOTE — CONSULT NOTE ADULT - SUBJECTIVE AND OBJECTIVE BOX
CARDIOLOGY CONSULT NOTE - DR. BRAUN        Date of Service: 12-30-24 @ 16:05      HPI:    69-year-old male with history of HTN, HLD, Aortic Regurgitation , Diastolic Heart Failure, CBD Stone s/p Stent, PAD, CKD, presenting with cough.  Patient states he came from [facility] December 21 and upon coming back developed a cough that has been bothering him.  Patient states the past Thursday and Friday had small amounts of bloody sputum at nighttime.  Patient denying weight loss      no active dyspnea, cp    PAST MEDICAL & SURGICAL HISTORY:  Hypertension      Diabetes  fs this am 120      COPD (chronic obstructive pulmonary disease)      Cholelithiasis      Heart murmur  recent dx      HLD (hyperlipidemia)      S/P exploratory laparotomy  23 years ago            PREVIOUS DIAGNOSTIC TESTING:    [ ] Echocardiogram:  [ ]  Catheterization:  [ ] Stress Test:  	    MEDICATIONS:    Home Medications:  clonidine 0.1 mg oral tablet: 2 tab(s) orally 2 times a day (02 Apr 2020 00:45)  doxazosin 2 mg oral tablet: 1 tab(s) orally once a day (02 Apr 2020 00:45)  labetalol 100 mg oral tablet: 3 tab(s) orally 2 times a day (02 Apr 2020 00:45)  Spiriva 18 mcg inhalation capsule: 1 each inhaled once a day (02 Apr 2020 00:45)  Symbicort 160 mcg-4.5 mcg/inh inhalation aerosol: 2 puff(s) inhaled 2 times a day (02 Apr 2020 00:45)      MEDICATIONS  (STANDING):      FAMILY HISTORY:      SOCIAL HISTORY:    [ ] Non-smoker  [ ] Smoker  [ ] Alcohol    Allergies    No Known Allergies    Intolerances    	    REVIEW OF SYSTEMS:  CONSTITUTIONAL: No fever, weight loss, or fatigue  EYES: No eye pain, visual disturbances, or discharge  ENMT:  No difficulty hearing, tinnitus, vertigo; No sinus or throat pain  NECK: No pain or stiffness  RESPIRATORY: No cough, wheezing, chills or hemoptysis; No Shortness of Breath  CARDIOVASCULAR: as HPI  GASTROINTESTINAL: No abdominal or epigastric pain. No nausea, vomiting, or hematemesis; No diarrhea or constipation. No melena or hematochezia.  GENITOURINARY: No dysuria, frequency, hematuria, or incontinence  NEUROLOGICAL: No headaches, memory loss, loss of strength, numbness, or tremors  SKIN: No itching, burning, rashes, or lesions   	  [ ] All others negative	  [ ] Unable to obtain    PHYSICAL EXAM:    T(C): 36.7 (12-30-24 @ 09:04), Max: 36.7 (12-30-24 @ 09:04)  HR: 100 (12-30-24 @ 09:04) (100 - 100)  BP: 174/90 (12-30-24 @ 09:04) (174/90 - 174/90)  RR: 16 (12-30-24 @ 09:04) (16 - 16)  SpO2: 99% (12-30-24 @ 09:04) (99% - 99%)  Wt(kg): --  I&O's Summary    Daily Height in cm: 185.42 (30 Dec 2024 09:04)    Daily     Appearance: Normal	  Psychiatry: A & O x 3, Mood & affect appropriate  HEENT:   Normal oral mucosa, PERRL, EOMI	  Lymphatic: No lymphadenopathy  Cardiovascular: Normal S1 S2,RRR, No JVD, No murmurs  Respiratory: Lungs clear to auscultation	  Gastrointestinal:  Soft, Non-tender, + BS	  Skin: No rashes, No ecchymoses, No cyanosis	  Neurologic: Non-focal  Extremities: Normal range of motion, No clubbing, cyanosis or edema  Vascular: Peripheral pulses palpable 2+ bilaterally    TELEMETRY: 	    ECG:  	  RADIOLOGY:  OTHER: 	  	  LABS:	 	    CARDIAC MARKERS:        proBNP:     Lipid Profile:   HgA1c:   TSH:                           13.9   8.97  )-----------( 202      ( 30 Dec 2024 11:03 )             41.8     12-30    145  |  108[H]  |  16  ----------------------------<  121[H]  4.2   |  27  |  1.51[H]    Ca    9.2      30 Dec 2024 11:03    TPro  6.7  /  Alb  3.4  /  TBili  0.3  /  DBili  x   /  AST  14  /  ALT  11  /  AlkPhos  82  12-30        Creatinine: 1.51 mg/dL (12-30-24 @ 11:03)        ASSESSMENT/PLAN:

## 2025-04-29 ENCOUNTER — APPOINTMENT (OUTPATIENT)
Dept: VASCULAR SURGERY | Facility: CLINIC | Age: 70
End: 2025-04-29
Payer: COMMERCIAL

## 2025-04-29 VITALS
WEIGHT: 254 LBS | SYSTOLIC BLOOD PRESSURE: 200 MMHG | HEIGHT: 73 IN | HEART RATE: 102 BPM | BODY MASS INDEX: 33.66 KG/M2 | DIASTOLIC BLOOD PRESSURE: 112 MMHG | TEMPERATURE: 98.9 F

## 2025-04-29 VITALS — HEART RATE: 102 BPM | SYSTOLIC BLOOD PRESSURE: 213 MMHG | DIASTOLIC BLOOD PRESSURE: 116 MMHG

## 2025-04-29 DIAGNOSIS — I77.1 STRICTURE OF ARTERY: ICD-10-CM

## 2025-04-29 DIAGNOSIS — I73.9 PERIPHERAL VASCULAR DISEASE, UNSPECIFIED: ICD-10-CM

## 2025-04-29 PROCEDURE — 99213 OFFICE O/P EST LOW 20 MIN: CPT

## 2025-05-27 ENCOUNTER — APPOINTMENT (OUTPATIENT)
Dept: OPHTHALMOLOGY | Facility: CLINIC | Age: 70
End: 2025-05-27
Payer: COMMERCIAL

## 2025-05-27 ENCOUNTER — NON-APPOINTMENT (OUTPATIENT)
Age: 70
End: 2025-05-27

## 2025-05-27 PROCEDURE — 92285 EXTERNAL OCULAR PHOTOGRAPHY: CPT

## 2025-05-27 PROCEDURE — 67840 REMOVE EYELID LESION: CPT | Mod: E4

## 2025-05-27 PROCEDURE — 99204 OFFICE O/P NEW MOD 45 MIN: CPT | Mod: 25

## 2025-06-24 ENCOUNTER — APPOINTMENT (OUTPATIENT)
Dept: OPHTHALMOLOGY | Facility: CLINIC | Age: 70
End: 2025-06-24
Payer: COMMERCIAL

## 2025-06-24 ENCOUNTER — NON-APPOINTMENT (OUTPATIENT)
Age: 70
End: 2025-06-24

## 2025-06-24 PROCEDURE — 99213 OFFICE O/P EST LOW 20 MIN: CPT

## 2025-06-24 PROCEDURE — 92285 EXTERNAL OCULAR PHOTOGRAPHY: CPT
